# Patient Record
Sex: FEMALE | Race: WHITE | NOT HISPANIC OR LATINO | Employment: UNEMPLOYED | URBAN - METROPOLITAN AREA
[De-identification: names, ages, dates, MRNs, and addresses within clinical notes are randomized per-mention and may not be internally consistent; named-entity substitution may affect disease eponyms.]

---
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
## 2020-01-08 PROCEDURE — 87624 HPV HI-RISK TYP POOLED RSLT: CPT | Performed by: SPECIALIST

## 2020-01-08 PROCEDURE — 88175 CYTOPATH C/V AUTO FLUID REDO: CPT | Performed by: SPECIALIST

## 2020-01-09 ENCOUNTER — LAB REQUISITION (OUTPATIENT)
Dept: LAB | Facility: HOSPITAL | Age: 20
End: 2020-01-09
Payer: COMMERCIAL

## 2020-01-09 DIAGNOSIS — Z01.419 ENCOUNTER FOR GYNECOLOGICAL EXAMINATION (GENERAL) (ROUTINE) WITHOUT ABNORMAL FINDINGS: ICD-10-CM

## 2020-01-10 LAB
HPV HR 12 DNA CVX QL NAA+PROBE: NEGATIVE
HPV16 DNA CVX QL NAA+PROBE: NEGATIVE
HPV18 DNA CVX QL NAA+PROBE: NEGATIVE

## 2020-01-15 LAB
LAB AP GYN PRIMARY INTERPRETATION: NORMAL
LAB AP LMP: NORMAL
Lab: NORMAL
PATH INTERP SPEC-IMP: NORMAL

## 2023-02-09 ENCOUNTER — HOSPITAL ENCOUNTER (EMERGENCY)
Facility: HOSPITAL | Age: 23
Discharge: HOME | End: 2023-02-09
Attending: EMERGENCY MEDICINE

## 2023-02-09 VITALS
RESPIRATION RATE: 18 BRPM | SYSTOLIC BLOOD PRESSURE: 126 MMHG | TEMPERATURE: 98.6 F | OXYGEN SATURATION: 94 % | HEART RATE: 82 BPM | DIASTOLIC BLOOD PRESSURE: 66 MMHG

## 2023-02-09 DIAGNOSIS — J02.0 STREP PHARYNGITIS: ICD-10-CM

## 2023-02-09 DIAGNOSIS — N39.0 UTI (URINARY TRACT INFECTION): Primary | ICD-10-CM

## 2023-02-09 LAB
BACTERIA UR QL AUTO: ABNORMAL /HPF
BILIRUB UR QL STRIP: NEGATIVE
CLARITY UR: ABNORMAL
COLOR UR: ABNORMAL
EXT PREGNANCY TEST URINE: NEGATIVE
EXT. CONTROL: NORMAL
GLUCOSE UR STRIP-MCNC: NEGATIVE MG/DL
HGB UR QL STRIP.AUTO: ABNORMAL
KETONES UR STRIP-MCNC: NEGATIVE MG/DL
LEUKOCYTE ESTERASE UR QL STRIP: ABNORMAL
NITRITE UR QL STRIP: NEGATIVE
NON-SQ EPI CELLS URNS QL MICRO: ABNORMAL /HPF
PH UR STRIP.AUTO: 6 [PH]
PROT UR STRIP-MCNC: ABNORMAL MG/DL
RBC #/AREA URNS AUTO: ABNORMAL /HPF
S PYO DNA THROAT QL NAA+PROBE: DETECTED
SP GR UR STRIP.AUTO: 1.02
UROBILINOGEN UR QL STRIP.AUTO: 0.2 E.U./DL
WBC #/AREA URNS AUTO: ABNORMAL /HPF

## 2023-02-09 RX ORDER — AMOXICILLIN AND CLAVULANATE POTASSIUM 875; 125 MG/1; MG/1
1 TABLET, FILM COATED ORAL ONCE
Status: COMPLETED | OUTPATIENT
Start: 2023-02-09 | End: 2023-02-09

## 2023-02-09 RX ORDER — AMOXICILLIN AND CLAVULANATE POTASSIUM 875; 125 MG/1; MG/1
1 TABLET, FILM COATED ORAL EVERY 12 HOURS
Qty: 14 TABLET | Refills: 0 | Status: SHIPPED | OUTPATIENT
Start: 2023-02-09 | End: 2023-02-16

## 2023-02-09 RX ADMIN — AMOXICILLIN AND CLAVULANATE POTASSIUM 1 TABLET: 875; 125 TABLET, FILM COATED ORAL at 15:23

## 2023-02-09 NOTE — ED PROVIDER NOTES
History  Chief Complaint   Patient presents with   • Blood in Urine     Burning / pain with urination  Lower bilateral back pain  Low abdominal pressure     20-year-old female presents emergency department complaining of 2-day history of dysuria and hematuria  The patient denies ever having a urinary tract infection before but feels that she has one now  The patient denies any significant back pain or fever or chills  Patient notes she is also having a sore throat and is concerned she may have strep throat  Patient notes that she has blood in her urine when she urinates and is urinating frequency with not much volume  Patient denies pregnancy and notes that she is on birth control  Patient visiting from [place] and has no local PMD           Prior to Admission Medications   Prescriptions Last Dose Informant Patient Reported? Taking?   ibuprofen (MOTRIN) 800 mg tablet   No No   Sig: Take 1 tablet (800 mg total) by mouth 3 (three) times a day   Patient not taking: Reported on 2019      Facility-Administered Medications: None       Past Medical History:   Diagnosis Date   •  2017   • Anemia        History reviewed  No pertinent surgical history  History reviewed  No pertinent family history  I have reviewed and agree with the history as documented  E-Cigarette/Vaping     E-Cigarette/Vaping Substances     Social History     Tobacco Use   • Smoking status: Every Day     Packs/day: 0 50     Types: Cigarettes   • Smokeless tobacco: Never   Substance Use Topics   • Alcohol use: No   • Drug use: Yes     Types: Marijuana     Comment: last use 2018       Review of Systems   Constitutional: Negative for chills and fever  HENT: Positive for sore throat  Negative for ear pain  Eyes: Negative for pain and visual disturbance  Respiratory: Negative for cough and shortness of breath  Cardiovascular: Negative for chest pain and palpitations  Gastrointestinal: Positive for abdominal pain  Negative for vomiting  Genitourinary: Positive for difficulty urinating, frequency and hematuria  Negative for dysuria  Musculoskeletal: Negative for arthralgias and back pain  Skin: Negative for color change and rash  Neurological: Negative for seizures and syncope  All other systems reviewed and are negative  Physical Exam  Physical Exam  Vitals and nursing note reviewed  Constitutional:       General: She is not in acute distress  Appearance: Normal appearance  She is well-developed  HENT:      Head: Normocephalic and atraumatic  Right Ear: External ear normal       Left Ear: External ear normal       Nose: Nose normal       Mouth/Throat:      Mouth: Mucous membranes are moist       Pharynx: Posterior oropharyngeal erythema present  No oropharyngeal exudate  Eyes:      Conjunctiva/sclera: Conjunctivae normal       Pupils: Pupils are equal, round, and reactive to light  Cardiovascular:      Rate and Rhythm: Normal rate and regular rhythm  Pulses: Normal pulses  Heart sounds: Normal heart sounds  No murmur heard  Pulmonary:      Effort: Pulmonary effort is normal  No respiratory distress  Breath sounds: Normal breath sounds  Abdominal:      General: Bowel sounds are normal       Palpations: Abdomen is soft  Tenderness: There is abdominal tenderness  Comments: Suprapubic tenderness noted without guarding rigidity rebound   Musculoskeletal:         General: No swelling or deformity  Cervical back: Neck supple  Skin:     General: Skin is warm and dry  Capillary Refill: Capillary refill takes less than 2 seconds  Neurological:      General: No focal deficit present  Mental Status: She is alert and oriented to person, place, and time  Mental status is at baseline           Vital Signs  ED Triage Vitals [02/09/23 1217]   Temperature Pulse Respirations Blood Pressure SpO2   98 6 °F (37 °C) 82 18 126/66 94 %      Temp Source Heart Rate Source Patient Position - Orthostatic VS BP Location FiO2 (%)   Temporal Monitor Sitting Left arm --      Pain Score       --           Vitals:    02/09/23 1217   BP: 126/66   Pulse: 82   Patient Position - Orthostatic VS: Sitting         Visual Acuity      ED Medications  Medications   amoxicillin-clavulanate (AUGMENTIN) 875-125 mg per tablet 1 tablet (1 tablet Oral Given 2/9/23 1523)       Diagnostic Studies  Results Reviewed     Procedure Component Value Units Date/Time    Strep A PCR [[id number]]  (Abnormal) Collected: 02/09/23 1416    Lab Status: Final result Specimen: Throat Updated: 02/09/23 1456     STREP A PCR Detected    POCT pregnancy, urine [[id number]]  (Normal) Resulted: 02/09/23 1325    Lab Status: Final result Updated: 02/09/23 1325     EXT Preg Test, Ur Negative     Control Valid    Urine Microscopic [[id number]]  (Abnormal) Collected: 02/09/23 1227    Lab Status: Final result Specimen: Urine, Clean Catch Updated: 02/09/23 1312     RBC, UA 30-50 /hpf      WBC, UA 20-30 /hpf      Epithelial Cells Occasional /hpf      Bacteria, UA Occasional /hpf     Urine culture [[id number]] Collected: 02/09/23 1227    Lab Status:  In process Specimen: Urine, Clean Catch Updated: 02/09/23 1307    UA w Reflex to Microscopic w Reflex to Culture [[id number]]  (Abnormal) Collected: 02/09/23 1227    Lab Status: Final result Specimen: Urine, Clean Catch Updated: 02/09/23 1259     Color, UA Brown     Clarity, UA Cloudy     Specific Gravity, UA 1 025     pH, UA 6 0     Leukocytes, UA 1+     Nitrite, UA Negative     Protein, UA 2+ mg/dl      Glucose, UA Negative mg/dl      Ketones, UA Negative mg/dl      Urobilinogen, UA 0 2 E U /dl      Bilirubin, UA Negative     Occult Blood, UA 3+                 No orders to display              Procedures  Procedures         ED Course  ED Course as of 02/09/23 2142   Thu Feb 09, 2023   1510 STREP A PCR(!): Detected                                             Medical Decision Making  19-year-old female with history of hematuria dysuria and frequency presents to the ER for evaluation of possible UTI  Patient denies any significant flank pain or fever  Patient also complaining of sore throat  Urinalysis done which shows elevated red and white cells in her urine  Strep positive  Patient placed on antibiotics to cover both the urine and strep throat  Patient from [place] so we will follow-up with her family doctor back home  Patient to return here if worse  Strep pharyngitis: acute illness or injury  UTI (urinary tract infection): acute illness or injury  Amount and/or Complexity of Data Reviewed  Labs: ordered  Decision-making details documented in ED Course  Risk  Prescription drug management  Disposition  Final diagnoses:   UTI (urinary tract infection)   Strep pharyngitis     Time reflects when diagnosis was documented in both MDM as applicable and the Disposition within this note     Time User Action Codes Description Comment    2/9/2023  3:13 PM Jamaal Fournier Add [N39 0] UTI (urinary tract infection)     2/9/2023  3:13 PM Jamaal Fournier Add [J02 0] Strep pharyngitis       ED Disposition     ED Disposition   Discharge    Condition   Stable    Date/Time   Thu Feb 9, 2023  3:20 PM    Comment   Michela Galeazzi discharge to home/self care  Follow-up Information    None         Discharge Medication List as of 2/9/2023  3:20 PM      START taking these medications    Details   amoxicillin-clavulanate (AUGMENTIN) 875-125 mg per tablet Take 1 tablet by mouth every 12 (twelve) hours for 7 days, Starting Thu 2/9/2023, Until Thu 2/16/2023, Normal         CONTINUE these medications which have NOT CHANGED    Details   ibuprofen (MOTRIN) 800 mg tablet Take 1 tablet (800 mg total) by mouth 3 (three) times a day, Starting Thu 5/30/2019, Normal             No discharge procedures on file  PDMP Review     None          ED Provider  Electronically Signed by           Rosales Husain  Cam Ohara ,   02/09/23 3267

## 2023-02-09 NOTE — DISCHARGE INSTRUCTIONS
Drink plenty of fluids  Consider taking over-the-counter pyridium to decrease pain with urination  Take Augmentin 1 pill twice a day for a week  Finish all antibiotics even if feeling better    Consider taking a probiotic or yogurt daily while on this medication  Follow-up with your family doctor in [place] upon your return  Return to the ER for any new, concerning, or worsening issues

## 2023-02-11 LAB — BACTERIA UR CULT: ABNORMAL

## 2023-10-25 ENCOUNTER — HOSPITAL ENCOUNTER (EMERGENCY)
Facility: HOSPITAL | Age: 23
Discharge: HOME | End: 2023-10-25
Attending: EMERGENCY MEDICINE
Payer: COMMERCIAL

## 2023-10-25 VITALS
SYSTOLIC BLOOD PRESSURE: 143 MMHG | WEIGHT: 98 LBS | OXYGEN SATURATION: 100 % | DIASTOLIC BLOOD PRESSURE: 79 MMHG | RESPIRATION RATE: 16 BRPM | TEMPERATURE: 99.7 F | HEART RATE: 87 BPM | HEIGHT: 64 IN | BODY MASS INDEX: 16.73 KG/M2

## 2023-10-25 DIAGNOSIS — D64.9 ANEMIA: ICD-10-CM

## 2023-10-25 DIAGNOSIS — R55 VASOVAGAL EPISODE: ICD-10-CM

## 2023-10-25 DIAGNOSIS — B34.9 VIRAL ILLNESS: Primary | ICD-10-CM

## 2023-10-25 LAB
ANION GAP SERPL CALCULATED.3IONS-SCNC: 9 MMOL/L
BASOPHILS # BLD AUTO: 0.05 THOUSANDS/ÂΜL (ref 0–0.1)
BASOPHILS NFR BLD AUTO: 1 % (ref 0–1)
BILIRUB UR QL STRIP: NEGATIVE
BUN SERPL-MCNC: 7 MG/DL (ref 5–25)
CALCIUM SERPL-MCNC: 9.8 MG/DL (ref 8.4–10.2)
CHLORIDE SERPL-SCNC: 103 MMOL/L (ref 96–108)
CLARITY UR: CLEAR
CO2 SERPL-SCNC: 26 MMOL/L (ref 21–32)
COLOR UR: NORMAL
CREAT SERPL-MCNC: 0.66 MG/DL (ref 0.6–1.3)
EOSINOPHIL # BLD AUTO: 0.06 THOUSAND/ÂΜL (ref 0–0.61)
EOSINOPHIL NFR BLD AUTO: 1 % (ref 0–6)
ERYTHROCYTE [DISTWIDTH] IN BLOOD BY AUTOMATED COUNT: 14.9 % (ref 11.6–15.1)
EXT PREGNANCY TEST URINE: NEGATIVE
EXT. CONTROL: NORMAL
GFR SERPL CREATININE-BSD FRML MDRD: 124 ML/MIN/1.73SQ M
GLUCOSE SERPL-MCNC: 92 MG/DL (ref 65–140)
GLUCOSE UR STRIP-MCNC: NEGATIVE MG/DL
HCT VFR BLD AUTO: 35.6 % (ref 34.8–46.1)
HGB BLD-MCNC: 10.9 G/DL (ref 11.5–15.4)
HGB UR QL STRIP.AUTO: NEGATIVE
IMM GRANULOCYTES # BLD AUTO: 0.02 THOUSAND/UL (ref 0–0.2)
IMM GRANULOCYTES NFR BLD AUTO: 0 % (ref 0–2)
KETONES UR STRIP-MCNC: NEGATIVE MG/DL
LEUKOCYTE ESTERASE UR QL STRIP: NEGATIVE
LYMPHOCYTES # BLD AUTO: 2.04 THOUSANDS/ÂΜL (ref 0.6–4.47)
LYMPHOCYTES NFR BLD AUTO: 20 % (ref 14–44)
MCH RBC QN AUTO: 24.7 PG (ref 26.8–34.3)
MCHC RBC AUTO-ENTMCNC: 30.6 G/DL (ref 31.4–37.4)
MCV RBC AUTO: 81 FL (ref 82–98)
MONOCYTES # BLD AUTO: 0.78 THOUSAND/ÂΜL (ref 0.17–1.22)
MONOCYTES NFR BLD AUTO: 8 % (ref 4–12)
NEUTROPHILS # BLD AUTO: 7.21 THOUSANDS/ÂΜL (ref 1.85–7.62)
NEUTS SEG NFR BLD AUTO: 70 % (ref 43–75)
NITRITE UR QL STRIP: NEGATIVE
NRBC BLD AUTO-RTO: 0 /100 WBCS
PH UR STRIP.AUTO: 6 [PH]
PLATELET # BLD AUTO: 353 THOUSANDS/UL (ref 149–390)
PMV BLD AUTO: 10.2 FL (ref 8.9–12.7)
POTASSIUM SERPL-SCNC: 4 MMOL/L (ref 3.5–5.3)
PROT UR STRIP-MCNC: NEGATIVE MG/DL
RBC # BLD AUTO: 4.42 MILLION/UL (ref 3.81–5.12)
SODIUM SERPL-SCNC: 138 MMOL/L (ref 135–147)
SP GR UR STRIP.AUTO: <=1.005 (ref 1–1.03)
UROBILINOGEN UR QL STRIP.AUTO: 0.2 E.U./DL
WBC # BLD AUTO: 10.16 THOUSAND/UL (ref 4.31–10.16)

## 2023-10-25 PROCEDURE — 81025 URINE PREGNANCY TEST: CPT | Performed by: EMERGENCY MEDICINE

## 2023-10-25 PROCEDURE — 96360 HYDRATION IV INFUSION INIT: CPT

## 2023-10-25 PROCEDURE — 93005 ELECTROCARDIOGRAM TRACING: CPT

## 2023-10-25 PROCEDURE — 80048 BASIC METABOLIC PNL TOTAL CA: CPT | Performed by: EMERGENCY MEDICINE

## 2023-10-25 PROCEDURE — 36415 COLL VENOUS BLD VENIPUNCTURE: CPT | Performed by: EMERGENCY MEDICINE

## 2023-10-25 PROCEDURE — 85025 COMPLETE CBC W/AUTO DIFF WBC: CPT | Performed by: EMERGENCY MEDICINE

## 2023-10-25 PROCEDURE — 99284 EMERGENCY DEPT VISIT MOD MDM: CPT | Performed by: EMERGENCY MEDICINE

## 2023-10-25 PROCEDURE — 81003 URINALYSIS AUTO W/O SCOPE: CPT | Performed by: EMERGENCY MEDICINE

## 2023-10-25 PROCEDURE — 99284 EMERGENCY DEPT VISIT MOD MDM: CPT

## 2023-10-25 RX ADMIN — SODIUM CHLORIDE 1000 ML: 0.9 INJECTION, SOLUTION INTRAVENOUS at 18:42

## 2023-10-25 NOTE — ED PROVIDER NOTES
History  Chief Complaint   Patient presents with    Dizziness     Lightheaded within the last hour of work, developed tunnel vision while driving home. Endorses chills, denies n/v/d. This is a 75-year-old female who presents emergency department complaint of lightheadedness and dizziness today at the end of her shift. She states that she was feeling fairly well until this occurred. She felt lightheaded. Patient felt like her vision was getting tunnel and she had ringing in her ears and she felt flushed during the episode. She felt like she was going to pass out but did not actually completely pass out. She states that when she left work while she was driving she developed some tunnel vision and felt like her lips got tingly. She had to pull over. She does state that she had a large cup of coffee today and also took a migraine headache medication that did contain caffeine. Patient also states that she had an episode of vomiting and some loose stool today as well. Denies fevers or chills. She denies possible pregnancy. No abdominal pain. No chest pain. During the episode she felt like her heart was going faster than normal.  LMP was approximately 1 week ago. Differential diagnosis includes vasovagal episode, gastroenteritis, electrolyte abnormality, dehydration, tachycardia due to caffeine use. Prior to Admission Medications   Prescriptions Last Dose Informant Patient Reported? Taking?   ibuprofen (MOTRIN) 800 mg tablet   No No   Sig: Take 1 tablet (800 mg total) by mouth 3 (three) times a day   Patient not taking: Reported on 2019      Facility-Administered Medications: None       Past Medical History:   Diagnosis Date     2017    Anemia        History reviewed. No pertinent surgical history. History reviewed. No pertinent family history. I have reviewed and agree with the history as documented.     E-Cigarette/Vaping     E-Cigarette/Vaping Substances     Social History Tobacco Use    Smoking status: Every Day     Packs/day: 0.50     Types: Cigarettes    Smokeless tobacco: Never   Substance Use Topics    Alcohol use: No    Drug use: Yes     Types: Marijuana     Comment: last use 6/2018       Review of Systems   Constitutional:  Negative for activity change, appetite change and fever. HENT:  Negative for congestion, ear pain, rhinorrhea and sore throat. Eyes:  Negative for pain and redness. Respiratory:  Negative for cough, shortness of breath and wheezing. Cardiovascular:  Negative for chest pain and palpitations. Gastrointestinal:  Positive for diarrhea and vomiting. Negative for abdominal pain and nausea. Endocrine: Negative for polyuria. Genitourinary:  Negative for difficulty urinating, dysuria, frequency and urgency. Musculoskeletal:  Negative for arthralgias and myalgias. Skin:  Negative for color change and rash. Allergic/Immunologic: Negative for immunocompromised state. Neurological:  Positive for dizziness and light-headedness. Negative for syncope. Hematological:  Does not bruise/bleed easily. Psychiatric/Behavioral:  Negative for confusion. All other systems reviewed and are negative. Physical Exam  Physical Exam  Vitals and nursing note reviewed. Constitutional:       General: She is not in acute distress. Appearance: She is well-developed. HENT:      Head: Normocephalic and atraumatic. Nose: Nose normal.   Eyes:      General: No scleral icterus. Extraocular Movements: Extraocular movements intact. Conjunctiva/sclera: Conjunctivae normal.      Pupils: Pupils are equal, round, and reactive to light. Cardiovascular:      Rate and Rhythm: Normal rate and regular rhythm. Pulses: Normal pulses. Heart sounds: Normal heart sounds. No murmur heard. Pulmonary:      Effort: Pulmonary effort is normal. No respiratory distress. Breath sounds: Normal breath sounds. No stridor. No wheezing.    Abdominal: General: There is no distension. Palpations: Abdomen is soft. Tenderness: There is no abdominal tenderness. There is no guarding or rebound. Musculoskeletal:         General: No deformity. Cervical back: Normal range of motion and neck supple. Skin:     General: Skin is warm and dry. Coloration: Skin is not jaundiced. Findings: No bruising or rash. Neurological:      Mental Status: She is alert and oriented to person, place, and time. Psychiatric:         Thought Content:  Thought content normal.         Vital Signs  ED Triage Vitals [10/25/23 1810]   Temperature Pulse Respirations Blood Pressure SpO2   99.7 °F (37.6 °C) 87 16 143/79 100 %      Temp Source Heart Rate Source Patient Position - Orthostatic VS BP Location FiO2 (%)   Temporal Monitor Lying Right arm --      Pain Score       --           Vitals:    10/25/23 1810   BP: 143/79   Pulse: 87   Patient Position - Orthostatic VS: Lying         Visual Acuity      ED Medications  Medications   sodium chloride 0.9 % bolus 1,000 mL (1,000 mL Intravenous New Bag 10/25/23 1842)       Diagnostic Studies  Results Reviewed       Procedure Component Value Units Date/Time    Basic metabolic panel [[id number]] Collected: 10/25/23 1836    Lab Status: Final result Specimen: Blood from Arm, Right Updated: 10/25/23 1901     Sodium 138 mmol/L      Potassium 4.0 mmol/L      Chloride 103 mmol/L      CO2 26 mmol/L      ANION GAP 9 mmol/L      BUN 7 mg/dL      Creatinine 0.66 mg/dL      Glucose 92 mg/dL      Calcium 9.8 mg/dL      eGFR 124 ml/min/1.73sq m     Narrative:      [place] guidelines for Chronic Kidney Disease (CKD):     Stage 1 with normal or high GFR (GFR > 90 mL/min/1.73 square meters)    Stage 2 Mild CKD (GFR = 60-89 mL/min/1.73 square meters)    Stage 3A Moderate CKD (GFR = 45-59 mL/min/1.73 square meters)    Stage 3B Moderate CKD (GFR = 30-44 mL/min/1.73 square meters)    Stage 4 Severe CKD (GFR = 15-29 mL/min/1.73 square meters)    Stage 5 End Stage CKD (GFR <15 mL/min/1.73 square meters)  Note: GFR calculation is accurate only with a steady state creatinine    UA (URINE) with reflex to Scope [[id number]] Collected: 10/25/23 1836    Lab Status: Final result Specimen: Urine, Other Updated: 10/25/23 1844     Color, UA Light Yellow     Clarity, UA Clear     Specific Gravity, UA <=1.005     pH, UA 6.0     Leukocytes, UA Negative     Nitrite, UA Negative     Protein, UA Negative mg/dl      Glucose, UA Negative mg/dl      Ketones, UA Negative mg/dl      Urobilinogen, UA 0.2 E.U./dl      Bilirubin, UA Negative     Occult Blood, UA Negative    POCT pregnancy, urine [[id number]]  (Normal) Resulted: 10/25/23 1842    Lab Status: Final result Updated: 10/25/23 1842     EXT Preg Test, Ur Negative     Control Valid    CBC and differential [[id number]]  (Abnormal) Collected: 10/25/23 1836    Lab Status: Final result Specimen: Blood from Arm, Right Updated: 10/25/23 1842     WBC 10.16 Thousand/uL      RBC 4.42 Million/uL      Hemoglobin 10.9 g/dL      Hematocrit 35.6 %      MCV 81 fL      MCH 24.7 pg      MCHC 30.6 g/dL      RDW 14.9 %      MPV 10.2 fL      Platelets 925 Thousands/uL      nRBC 0 /100 WBCs      Neutrophils Relative 70 %      Immat GRANS % 0 %      Lymphocytes Relative 20 %      Monocytes Relative 8 %      Eosinophils Relative 1 %      Basophils Relative 1 %      Neutrophils Absolute 7.21 Thousands/µL      Immature Grans Absolute 0.02 Thousand/uL      Lymphocytes Absolute 2.04 Thousands/µL      Monocytes Absolute 0.78 Thousand/µL      Eosinophils Absolute 0.06 Thousand/µL      Basophils Absolute 0.05 Thousands/µL                    No orders to display              Procedures  ECG 12 Lead Documentation Only    Date/Time: 10/25/2023 6:24 PM    Performed by: Tate Husain MD  Authorized by: Tate Husain MD    Indications / Diagnosis:  Lightheaded  ECG reviewed by me, the ED Provider: yes    Patient location:  ED  Rate:     ECG rate assessment: normal    Rhythm:     Rhythm: sinus rhythm    Ectopy:     Ectopy: none    QRS:     QRS axis:  Normal    QRS intervals:  Normal  Conduction:     Conduction: normal    ST segments:     ST segments:  Normal  T waves:     T waves: normal             ED Course  ED Course as of 10/25/23 1947   Wed Oct 25, 2023   1829 Review of records shows no recent ER visits or hospitalizations. 1921 Hemoglobin(!): 10.9   1921 Old records reviewed hemoglobin baseline 4 years ago was 9.2.   1945 Patient is feeling better after fluids. Lab results were discussed with patient. She was advised of anemia and states that she has had this in the past with prior pregnancies. She was advised to take a over-the-counter iron supplement or supplement her diet with iron rich foods. Follow-up with PCP. Will give ambulatory referral as patient is new to the area. Medical Decision Making  Patient with likely vasovagal episode today. Had some vomiting and loose stool today. Likely viral illness. Labs look good. Does have anemia. Patient hydrated in the ED feeling better. Amount and/or Complexity of Data Reviewed  External Data Reviewed: notes. Details: See ED course  Labs: ordered. Decision-making details documented in ED Course. ECG/medicine tests: ordered and independent interpretation performed. Risk  OTC drugs.              Disposition  Final diagnoses:   Viral illness   Anemia   Vasovagal episode     Time reflects when diagnosis was documented in both MDM as applicable and the Disposition within this note       Time User Action Codes Description Comment    10/25/2023  7:42 PM Renee Oswald Add [B34.9] Viral illness     10/25/2023  7:42 PM Renee Oswald Add [D64.9] Anemia     10/25/2023  7:42 PM Seble John Add [R55] Vasovagal episode           ED Disposition       ED Disposition   Discharge    Condition   Stable Date/Time   Wed Oct 25, 2023  7:42 PM    Comment   Grant Lei discharge to home/self care.                    Follow-up Information    None         Patient's Medications   Discharge Prescriptions    No medications on file           PDMP Review       None            ED Provider  Electronically Signed by             Watson Gale MD  10/25/23 7905

## 2023-10-25 NOTE — DISCHARGE INSTRUCTIONS
Your labs showed that you are slightly anemic today. You can supplement your diet with high iron foods or take an over the counter iron supplement. Please follow-up with your primary care provider for further evaluation.

## 2023-10-27 LAB
ATRIAL RATE: 84 BPM
P AXIS: 47 DEGREES
PR INTERVAL: 142 MS
QRS AXIS: 58 DEGREES
QRSD INTERVAL: 92 MS
QT INTERVAL: 364 MS
QTC INTERVAL: 430 MS
T WAVE AXIS: 68 DEGREES
VENTRICULAR RATE: 84 BPM

## 2023-10-27 PROCEDURE — 93010 ELECTROCARDIOGRAM REPORT: CPT | Performed by: INTERNAL MEDICINE

## 2024-02-03 ENCOUNTER — OFFICE VISIT (OUTPATIENT)
Dept: URGENT CARE | Facility: CLINIC | Age: 24
End: 2024-02-03
Payer: COMMERCIAL

## 2024-02-03 VITALS
HEART RATE: 108 BPM | HEIGHT: 63 IN | BODY MASS INDEX: 17.82 KG/M2 | WEIGHT: 100.6 LBS | OXYGEN SATURATION: 99 % | SYSTOLIC BLOOD PRESSURE: 101 MMHG | RESPIRATION RATE: 20 BRPM | TEMPERATURE: 100.2 F | DIASTOLIC BLOOD PRESSURE: 68 MMHG

## 2024-02-03 DIAGNOSIS — J02.0 STREP PHARYNGITIS: Primary | ICD-10-CM

## 2024-02-03 LAB — S PYO AG THROAT QL: POSITIVE

## 2024-02-03 PROCEDURE — 87880 STREP A ASSAY W/OPTIC: CPT | Performed by: PHYSICIAN ASSISTANT

## 2024-02-03 PROCEDURE — G0382 LEV 3 HOSP TYPE B ED VISIT: HCPCS | Performed by: PHYSICIAN ASSISTANT

## 2024-02-03 RX ORDER — AMOXICILLIN 875 MG/1
875 TABLET, COATED ORAL 2 TIMES DAILY
Qty: 20 TABLET | Refills: 0 | Status: SHIPPED | OUTPATIENT
Start: 2024-02-03 | End: 2024-02-13

## 2024-02-03 NOTE — PATIENT INSTRUCTIONS
I have prescribed an antibiotic for the infection.  Please take the antibiotic as prescribed and finish the entire prescription.  Take an over the counter probiotic or eat yogurt with live cultures in it (activia) to keep good bacteria in the gut and help prevent diarrhea.      Can use over the counter cough and cold medications to help with symptoms.    Ibuprofen and/or tylenol as needed for pain or fever.    Salt water gargles.  Chloraseptic throat spray or lozenges.  Warm tea with honey.  Drink iced/cold drinks.    Strep is contagious.  Avoid being around others until you are on the antibiotic for 24 hours.  Change your toothbrush towards the end of your antibiotic course.  Wash hands frequently to prevent the spread of infection, do not share cups etc.      If not improving over the next 7-10 days, follow up with PCP.

## 2024-02-03 NOTE — LETTER
February 3, 2024     Patient: Vannessa Birmingham   YOB: 2000   Date of Visit: 2/3/2024       To Whom It May Concern:    It is my medical opinion that Vannessa Birmingham should not return to work until 2/5/24.    If you have any questions or concerns, please don't hesitate to call.         Sincerely,        Michelle Behler, PA-C    CC: No Recipients

## 2024-02-03 NOTE — PROGRESS NOTES
[facility]    NAME: Vannessa Birmingham is a 23 y.o. female  : 2000    MRN: [id number]  DATE: February 3, 2024  TIME: 9:41 AM    Assessment and Plan   Strep pharyngitis [J02.0]  1. Strep pharyngitis  POCT rapid ANTIGEN strepA    amoxicillin (AMOXIL) 875 mg tablet          Patient Instructions     Patient Instructions   I have prescribed an antibiotic for the infection.  Please take the antibiotic as prescribed and finish the entire prescription.  Take an over the counter probiotic or eat yogurt with live cultures in it (activia) to keep good bacteria in the gut and help prevent diarrhea.      Can use over the counter cough and cold medications to help with symptoms.    Ibuprofen and/or tylenol as needed for pain or fever.    Salt water gargles.  Chloraseptic throat spray or lozenges.  Warm tea with honey.  Drink iced/cold drinks.    Strep is contagious.  Avoid being around others until you are on the antibiotic for 24 hours.  Change your toothbrush towards the end of your antibiotic course.  Wash hands frequently to prevent the spread of infection, do not share cups etc.      If not improving over the next 7-10 days, follow up with PCP.      Chief Complaint     Chief Complaint   Patient presents with    Sore Throat     Since yesterday with low grade temp, 2 ppl in home are +strep       History of Present Illness   23 year old female here with complaint of sore throat and headache since yesterday. Fever.  Family positive for strep.        Review of Systems   Review of Systems   Constitutional:  Positive for fever. Negative for appetite change and chills.   HENT:  Positive for sore throat. Negative for congestion, ear discharge, ear pain, facial swelling, postnasal drip, sinus pressure and sneezing.    Respiratory:  Negative for cough, shortness of breath and wheezing.    Neurological:  Positive for headaches.       Current Medications     Current Outpatient Medications:     amoxicillin (AMOXIL) 875  "mg tablet, Take 1 tablet (875 mg total) by mouth 2 (two) times a day for 10 days, Disp: 20 tablet, Rfl: 0    ibuprofen (MOTRIN) 800 mg tablet, Take 1 tablet (800 mg total) by mouth 3 (three) times a day (Patient not taking: Reported on 2019), Disp: 21 tablet, Rfl: 0    Current Allergies     Allergies as of 2024    (No Known Allergies)          The following portions of the patient's history were reviewed and updated as appropriate: allergies, current medications, past family history, past medical history, past social history, past surgical history and problem list.   Past Medical History:   Diagnosis Date     2017    Anemia      History reviewed. No pertinent surgical history.  History reviewed. No pertinent family history.  Social History     Socioeconomic History    Marital status: Single     Spouse name: Not on file    Number of children: Not on file    Years of education: Not on file    Highest education level: Not on file   Occupational History    Not on file   Tobacco Use    Smoking status: Former     Current packs/day: 0.00     Types: Cigarettes     Quit date:      Years since quittin.0    Smokeless tobacco: Never   Vaping Use    Vaping status: Some Days    Substances: Nicotine   Substance and Sexual Activity    Alcohol use: No    Drug use: Yes     Types: Marijuana     Comment: last use 2018    Sexual activity: Yes     Partners: Male   Other Topics Concern    Not on file   Social History Narrative    Not on file     Social Determinants of Health     Financial Resource Strain: Not on file   Food Insecurity: Not on file   Transportation Needs: Not on file   Physical Activity: Not on file   Stress: Not on file   Social Connections: Not on file   Intimate Partner Violence: Not on file   Housing Stability: Not on file     Medications have been verified.    Objective   /68   Pulse (!) 108   Temp 100.2 °F (37.9 °C)   Resp 20   Ht 5' 3\" (1.6 m)   Wt 45.6 kg (100 lb 9.6 oz)  "  SpO2 99%   BMI 17.82 kg/m²      Physical Exam   Physical Exam  Vitals and nursing note reviewed.   Constitutional:       General: She is not in acute distress.     Appearance: She is well-developed.   HENT:      Head: Normocephalic and atraumatic.      Right Ear: Tympanic membrane normal.      Left Ear: Tympanic membrane normal.      Nose: Nose normal. No mucosal edema or rhinorrhea.      Right Sinus: No maxillary sinus tenderness or frontal sinus tenderness.      Left Sinus: No maxillary sinus tenderness or frontal sinus tenderness.      Mouth/Throat:      Pharynx: Pharyngeal swelling and posterior oropharyngeal erythema present. No oropharyngeal exudate.      Tonsils: 3+ on the right. 3+ on the left.   Eyes:      Conjunctiva/sclera: Conjunctivae normal.   Cardiovascular:      Rate and Rhythm: Normal rate and regular rhythm.      Heart sounds: Normal heart sounds. No murmur heard.

## 2024-05-16 ENCOUNTER — VBI (OUTPATIENT)
Dept: ADMINISTRATIVE | Facility: OTHER | Age: 24
End: 2024-05-16

## 2024-09-03 ENCOUNTER — HOSPITAL ENCOUNTER (EMERGENCY)
Facility: HOSPITAL | Age: 24
Discharge: HOME | End: 2024-09-03
Attending: EMERGENCY MEDICINE
Payer: COMMERCIAL

## 2024-09-03 ENCOUNTER — APPOINTMENT (EMERGENCY)
Dept: CT IMAGING | Facility: HOSPITAL | Age: 24
End: 2024-09-03
Payer: COMMERCIAL

## 2024-09-03 VITALS
BODY MASS INDEX: 19.38 KG/M2 | WEIGHT: 109.35 LBS | HEIGHT: 63 IN | RESPIRATION RATE: 16 BRPM | SYSTOLIC BLOOD PRESSURE: 104 MMHG | DIASTOLIC BLOOD PRESSURE: 62 MMHG | HEART RATE: 87 BPM | OXYGEN SATURATION: 100 % | TEMPERATURE: 99.1 F

## 2024-09-03 DIAGNOSIS — N12 PYELONEPHRITIS: Primary | ICD-10-CM

## 2024-09-03 LAB
ALBUMIN SERPL BCG-MCNC: 4.5 G/DL (ref 3.5–5)
ALP SERPL-CCNC: 50 U/L (ref 34–104)
ALT SERPL W P-5'-P-CCNC: 7 U/L (ref 7–52)
ANION GAP SERPL CALCULATED.3IONS-SCNC: 12 MMOL/L (ref 4–13)
APTT PPP: 29 SECONDS (ref 23–34)
AST SERPL W P-5'-P-CCNC: 13 U/L (ref 13–39)
BACTERIA UR QL AUTO: ABNORMAL /HPF
BASOPHILS # BLD AUTO: 0.05 THOUSANDS/ÂΜL (ref 0–0.1)
BASOPHILS NFR BLD AUTO: 0 % (ref 0–1)
BILIRUB SERPL-MCNC: 0.48 MG/DL (ref 0.2–1)
BILIRUB UR QL STRIP: NEGATIVE
BUN SERPL-MCNC: 10 MG/DL (ref 5–25)
CALCIUM SERPL-MCNC: 9.8 MG/DL (ref 8.4–10.2)
CHLORIDE SERPL-SCNC: 101 MMOL/L (ref 96–108)
CLARITY UR: ABNORMAL
CO2 SERPL-SCNC: 22 MMOL/L (ref 21–32)
COLOR UR: YELLOW
CREAT SERPL-MCNC: 0.78 MG/DL (ref 0.6–1.3)
EOSINOPHIL # BLD AUTO: 0.04 THOUSAND/ÂΜL (ref 0–0.61)
EOSINOPHIL NFR BLD AUTO: 0 % (ref 0–6)
ERYTHROCYTE [DISTWIDTH] IN BLOOD BY AUTOMATED COUNT: 17.2 % (ref 11.6–15.1)
EXT PREGNANCY TEST URINE: NEGATIVE
EXT. CONTROL: NORMAL
GFR SERPL CREATININE-BSD FRML MDRD: 106 ML/MIN/1.73SQ M
GLUCOSE SERPL-MCNC: 93 MG/DL (ref 65–140)
GLUCOSE UR STRIP-MCNC: NEGATIVE MG/DL
HCT VFR BLD AUTO: 36.9 % (ref 34.8–46.1)
HGB BLD-MCNC: 11.4 G/DL (ref 11.5–15.4)
HGB UR QL STRIP.AUTO: ABNORMAL
IMM GRANULOCYTES # BLD AUTO: 0.05 THOUSAND/UL (ref 0–0.2)
IMM GRANULOCYTES NFR BLD AUTO: 0 % (ref 0–2)
INR PPP: 1.01 (ref 0.85–1.19)
KETONES UR STRIP-MCNC: ABNORMAL MG/DL
LEUKOCYTE ESTERASE UR QL STRIP: ABNORMAL
LYMPHOCYTES # BLD AUTO: 1.72 THOUSANDS/ÂΜL (ref 0.6–4.47)
LYMPHOCYTES NFR BLD AUTO: 13 % (ref 14–44)
MCH RBC QN AUTO: 24.6 PG (ref 26.8–34.3)
MCHC RBC AUTO-ENTMCNC: 30.9 G/DL (ref 31.4–37.4)
MCV RBC AUTO: 80 FL (ref 82–98)
MONOCYTES # BLD AUTO: 1.48 THOUSAND/ÂΜL (ref 0.17–1.22)
MONOCYTES NFR BLD AUTO: 11 % (ref 4–12)
NEUTROPHILS # BLD AUTO: 10.3 THOUSANDS/ÂΜL (ref 1.85–7.62)
NEUTS SEG NFR BLD AUTO: 76 % (ref 43–75)
NITRITE UR QL STRIP: POSITIVE
NON-SQ EPI CELLS URNS QL MICRO: ABNORMAL /HPF
NRBC BLD AUTO-RTO: 0 /100 WBCS
PH UR STRIP.AUTO: 6 [PH]
PLATELET # BLD AUTO: 336 THOUSANDS/UL (ref 149–390)
PMV BLD AUTO: 10.4 FL (ref 8.9–12.7)
POTASSIUM SERPL-SCNC: 3.6 MMOL/L (ref 3.5–5.3)
PROT SERPL-MCNC: 7.8 G/DL (ref 6.4–8.4)
PROT UR STRIP-MCNC: ABNORMAL MG/DL
PROTHROMBIN TIME: 13.8 SECONDS (ref 12.3–15)
RBC # BLD AUTO: 4.63 MILLION/UL (ref 3.81–5.12)
RBC #/AREA URNS AUTO: ABNORMAL /HPF
SODIUM SERPL-SCNC: 135 MMOL/L (ref 135–147)
SP GR UR STRIP.AUTO: 1.02
UROBILINOGEN UR QL STRIP.AUTO: 0.2 E.U./DL
WBC # BLD AUTO: 13.64 THOUSAND/UL (ref 4.31–10.16)
WBC #/AREA URNS AUTO: ABNORMAL /HPF

## 2024-09-03 PROCEDURE — 74176 CT ABD & PELVIS W/O CONTRAST: CPT

## 2024-09-03 PROCEDURE — 80053 COMPREHEN METABOLIC PANEL: CPT | Performed by: EMERGENCY MEDICINE

## 2024-09-03 PROCEDURE — 87077 CULTURE AEROBIC IDENTIFY: CPT

## 2024-09-03 PROCEDURE — 99284 EMERGENCY DEPT VISIT MOD MDM: CPT

## 2024-09-03 PROCEDURE — 85730 THROMBOPLASTIN TIME PARTIAL: CPT | Performed by: EMERGENCY MEDICINE

## 2024-09-03 PROCEDURE — 81025 URINE PREGNANCY TEST: CPT | Performed by: EMERGENCY MEDICINE

## 2024-09-03 PROCEDURE — 36415 COLL VENOUS BLD VENIPUNCTURE: CPT | Performed by: EMERGENCY MEDICINE

## 2024-09-03 PROCEDURE — 96365 THER/PROPH/DIAG IV INF INIT: CPT

## 2024-09-03 PROCEDURE — 81001 URINALYSIS AUTO W/SCOPE: CPT

## 2024-09-03 PROCEDURE — 96375 TX/PRO/DX INJ NEW DRUG ADDON: CPT

## 2024-09-03 PROCEDURE — 99284 EMERGENCY DEPT VISIT MOD MDM: CPT | Performed by: EMERGENCY MEDICINE

## 2024-09-03 PROCEDURE — 85025 COMPLETE CBC W/AUTO DIFF WBC: CPT | Performed by: EMERGENCY MEDICINE

## 2024-09-03 PROCEDURE — 87086 URINE CULTURE/COLONY COUNT: CPT

## 2024-09-03 PROCEDURE — 85610 PROTHROMBIN TIME: CPT | Performed by: EMERGENCY MEDICINE

## 2024-09-03 PROCEDURE — 87186 SC STD MICRODIL/AGAR DIL: CPT

## 2024-09-03 RX ORDER — KETOROLAC TROMETHAMINE 30 MG/ML
15 INJECTION, SOLUTION INTRAMUSCULAR; INTRAVENOUS ONCE
Status: COMPLETED | OUTPATIENT
Start: 2024-09-03 | End: 2024-09-03

## 2024-09-03 RX ORDER — CEFTRIAXONE 1 G/50ML
1000 INJECTION, SOLUTION INTRAVENOUS ONCE
Status: COMPLETED | OUTPATIENT
Start: 2024-09-03 | End: 2024-09-03

## 2024-09-03 RX ORDER — CEFDINIR 300 MG/1
300 CAPSULE ORAL EVERY 12 HOURS SCHEDULED
Qty: 20 CAPSULE | Refills: 0 | Status: SHIPPED | OUTPATIENT
Start: 2024-09-03 | End: 2024-09-07

## 2024-09-03 RX ADMIN — SODIUM CHLORIDE 500 ML: 0.9 INJECTION, SOLUTION INTRAVENOUS at 14:02

## 2024-09-03 RX ADMIN — KETOROLAC TROMETHAMINE 15 MG: 30 INJECTION, SOLUTION INTRAMUSCULAR at 14:28

## 2024-09-03 RX ADMIN — CEFTRIAXONE 1000 MG: 1 INJECTION, SOLUTION INTRAVENOUS at 13:49

## 2024-09-03 NOTE — DISCHARGE INSTRUCTIONS
Drink plenty fluids, consider cranberry juice as this may make an infection more difficult to spread.    Use Omnicef 1 pill twice a day for 10 days.  Finish all antibiotics even if doing better.    Use probiotic or yogurt daily while on this medication.    Use Motrin or Tylenol as needed for pain.    If you feel like you are getting much sicker, despite treatment, you should return to the ER.    You should have a family doctor for chronic medical care.  We will have our schedulers contact you to set up a follow-up appointment so you can be reevaluated after you are home and treated for a few days.

## 2024-09-03 NOTE — ED PROVIDER NOTES
History  Chief Complaint   Patient presents with    Flank Pain     Pt reports right sided flank pain starting this morning.      24-year-old female presents emergency room complaining of back pain as well as flank pain.  Patient notes that she has had fatigue over the last 24 hours and then went to bed last night.  She woke up today with right lower quadrant and right flank pain.  Patient denies fever chills nausea or vomiting.  Patient was concerned so she came to the hospital.  Patient denies any urinary frequency.        Prior to Admission Medications   Prescriptions Last Dose Informant Patient Reported? Taking?   ibuprofen (MOTRIN) 800 mg tablet   No No   Sig: Take 1 tablet (800 mg total) by mouth 3 (three) times a day   Patient not taking: Reported on 2019      Facility-Administered Medications: None       Past Medical History:   Diagnosis Date     2017    Anemia        No past surgical history on file.    No family history on file.  I have reviewed and agree with the history as documented.    E-Cigarette/Vaping    E-Cigarette Use Current Some Day User      E-Cigarette/Vaping Substances    Nicotine Yes      Social History     Tobacco Use    Smoking status: Former     Current packs/day: 0.00     Types: Cigarettes     Quit date:      Years since quittin.6    Smokeless tobacco: Never   Vaping Use    Vaping status: Some Days    Substances: Nicotine   Substance Use Topics    Alcohol use: No    Drug use: Yes     Types: Marijuana     Comment: last use        Review of Systems   Constitutional:  Positive for activity change. Negative for chills and fever.   HENT:  Negative for ear pain and sore throat.    Eyes:  Negative for pain and visual disturbance.   Respiratory:  Negative for cough and shortness of breath.    Cardiovascular:  Negative for chest pain and palpitations.   Gastrointestinal:  Negative for abdominal pain and vomiting.   Genitourinary:  Positive for flank pain. Negative for  dysuria and hematuria.   Musculoskeletal:  Positive for back pain. Negative for arthralgias.   Skin:  Negative for color change and rash.   Neurological:  Negative for syncope.   All other systems reviewed and are negative.      Physical Exam  Physical Exam  Vitals and nursing note reviewed.   Constitutional:       General: She is not in acute distress.     Appearance: Normal appearance. She is well-developed.   HENT:      Head: Normocephalic and atraumatic.      Right Ear: External ear normal.      Left Ear: External ear normal.      Nose: Nose normal.      Mouth/Throat:      Mouth: Mucous membranes are moist.   Eyes:      Conjunctiva/sclera: Conjunctivae normal.   Cardiovascular:      Rate and Rhythm: Normal rate and regular rhythm.      Pulses: Normal pulses.      Heart sounds: Normal heart sounds. No murmur heard.  Pulmonary:      Effort: Pulmonary effort is normal. No respiratory distress.      Breath sounds: Normal breath sounds.   Abdominal:      General: Bowel sounds are normal.      Palpations: Abdomen is soft.      Tenderness: There is abdominal tenderness. There is right CVA tenderness. There is no guarding or rebound.   Musculoskeletal:         General: No swelling or deformity.      Cervical back: Neck supple. No rigidity.   Skin:     General: Skin is warm and dry.      Capillary Refill: Capillary refill takes less than 2 seconds.      Coloration: Skin is not pale.   Neurological:      General: No focal deficit present.      Mental Status: She is alert and oriented to person, place, and time. Mental status is at baseline.         Vital Signs  ED Triage Vitals [09/03/24 1307]   Temperature Pulse Respirations Blood Pressure SpO2   99.1 °F (37.3 °C) 84 18 96/53 100 %      Temp Source Heart Rate Source Patient Position - Orthostatic VS BP Location FiO2 (%)   Temporal Monitor -- Right arm --      Pain Score       No Pain           Vitals:    09/03/24 1307 09/03/24 1500   BP: 96/53 104/62   Pulse: 84 87          Visual Acuity      ED Medications  Medications   cefTRIAXone (ROCEPHIN) IVPB (premix in dextrose) 1,000 mg 50 mL (0 mg Intravenous Stopped 9/3/24 1430)   sodium chloride 0.9 % bolus 500 mL (0 mL Intravenous Stopped 9/3/24 1459)   ketorolac (TORADOL) injection 15 mg (15 mg Intravenous Given 9/3/24 1428)       Diagnostic Studies  Results Reviewed       Procedure Component Value Units Date/Time    Comprehensive metabolic panel [[id number]] Collected: 09/03/24 1319    Lab Status: Final result Specimen: Blood from Arm, Right Updated: 09/03/24 1343     Sodium 135 mmol/L      Potassium 3.6 mmol/L      Chloride 101 mmol/L      CO2 22 mmol/L      ANION GAP 12 mmol/L      BUN 10 mg/dL      Creatinine 0.78 mg/dL      Glucose 93 mg/dL      Calcium 9.8 mg/dL      AST 13 U/L      ALT 7 U/L      Alkaline Phosphatase 50 U/L      Total Protein 7.8 g/dL      Albumin 4.5 g/dL      Total Bilirubin 0.48 mg/dL      eGFR 106 ml/min/1.73sq m     Narrative:      National Kidney Disease Foundation guidelines for Chronic Kidney Disease (CKD):     Stage 1 with normal or high GFR (GFR > 90 mL/min/1.73 square meters)    Stage 2 Mild CKD (GFR = 60-89 mL/min/1.73 square meters)    Stage 3A Moderate CKD (GFR = 45-59 mL/min/1.73 square meters)    Stage 3B Moderate CKD (GFR = 30-44 mL/min/1.73 square meters)    Stage 4 Severe CKD (GFR = 15-29 mL/min/1.73 square meters)    Stage 5 End Stage CKD (GFR <15 mL/min/1.73 square meters)  Note: GFR calculation is accurate only with a steady state creatinine    Protime-INR [[id number]]  (Normal) Collected: 09/03/24 1321    Lab Status: Final result Specimen: Blood from Arm, Right Updated: 09/03/24 1339     Protime 13.8 seconds      INR 1.01    Narrative:      INR Therapeutic Range    Indication                                             INR Range      Atrial Fibrillation                                               2.0-3.0  Hypercoagulable State                                    2.0.2.3  Left  Ventricular Asist Device                            2.0-3.0  Mechanical Heart Valve                                  -    Aortic(with afib, MI, embolism, HF, LA enlargement,    and/or coagulopathy)                                     2.0-3.0 (2.5-3.5)     Mitral                                                             2.5-3.5  Prosthetic/Bioprosthetic Heart Valve               2.0-3.0  Venous thromboembolism (VTE: VT, PE        2.0-3.0    APTT [[id number]]  (Normal) Collected: 09/03/24 1321    Lab Status: Final result Specimen: Blood from Arm, Right Updated: 09/03/24 1339     PTT 29 seconds     Urine Microscopic [[id number]]  (Abnormal) Collected: 09/03/24 1320    Lab Status: Final result Specimen: Urine, Clean Catch Updated: 09/03/24 1339     RBC, UA 4-10 /hpf      WBC, UA Innumerable /hpf      Epithelial Cells Occasional /hpf      Bacteria, UA Moderate /hpf     Urine culture [[id number]] Collected: 09/03/24 1320    Lab Status: In process Specimen: Urine, Clean Catch Updated: 09/03/24 1339    CBC and differential [[id number]]  (Abnormal) Collected: 09/03/24 1321    Lab Status: Final result Specimen: Blood from Arm, Right Updated: 09/03/24 1328     WBC 13.64 Thousand/uL      RBC 4.63 Million/uL      Hemoglobin 11.4 g/dL      Hematocrit 36.9 %      MCV 80 fL      MCH 24.6 pg      MCHC 30.9 g/dL      RDW 17.2 %      MPV 10.4 fL      Platelets 336 Thousands/uL      nRBC 0 /100 WBCs      Segmented % 76 %      Immature Grans % 0 %      Lymphocytes % 13 %      Monocytes % 11 %      Eosinophils Relative 0 %      Basophils Relative 0 %      Absolute Neutrophils 10.30 Thousands/µL      Absolute Immature Grans 0.05 Thousand/uL      Absolute Lymphocytes 1.72 Thousands/µL      Absolute Monocytes 1.48 Thousand/µL      Eosinophils Absolute 0.04 Thousand/µL      Basophils Absolute 0.05 Thousands/µL     UA w Reflex to Microscopic w Reflex to Culture [[id number]]  (Abnormal) Collected: 09/03/24 1320    Lab Status: Final result  Specimen: Urine, Clean Catch Updated: 09/03/24 1326     Color, UA Yellow     Clarity, UA Hazy     Specific Gravity, UA 1.020     pH, UA 6.0     Leukocytes, UA 2+     Nitrite, UA Positive     Protein, UA 2+ mg/dl      Glucose, UA Negative mg/dl      Ketones, UA 15 (1+) mg/dl      Urobilinogen, UA 0.2 E.U./dl      Bilirubin, UA Negative     Occult Blood, UA 2+    POCT pregnancy, urine [[id number]]  (Normal) Resulted: 09/03/24 1321    Lab Status: Final result Updated: 09/03/24 1322     EXT Preg Test, Ur Negative     Control Valid                   CT abdomen pelvis wo contrast   Final Result by Brett Mendes MD (09/03 1414)      Mild nonspecific circumferential bladder wall thickening of underdistended bladder. Recommend correlation with urinalysis to exclude cystitis.      Additional chronic/incidental findings as detailed above.      The study was marked in EPIC for immediate notification.               Workstation performed: [id number]                    Procedures  Procedures         ED Course  ED Course as of 09/04/24 0048   Tue Sep 03, 2024   1336 WBC(!): 13.64                                 SBIRT 20yo+      Flowsheet Row Most Recent Value   Initial Alcohol Screen: US AUDIT-C     1. How often do you have a drink containing alcohol? 0 Filed at: 09/03/2024 1307   2. How many drinks containing alcohol do you have on a typical day you are drinking?  0 Filed at: 09/03/2024 1307   3b. FEMALE Any Age, or MALE 65+: How often do you have 4 or more drinks on one occassion? 0 Filed at: 09/03/2024 1307   Audit-C Score 0 Filed at: 09/03/2024 1307   KARISSA: How many times in the past year have you...    Used an illegal drug or used a prescription medication for non-medical reasons? Never Filed at: 09/03/2024 1307                      Medical Decision Making  24-year-old female presents emerged with flank pain and right lower quadrant pain for roughly a day.  The patient denies nausea vomiting diarrhea vaginal discharge  or pregnancy, noting that she has a contraceptive implant in her left arm.  Patient notes no fever or chills but notes that she has been fatigued over the last day and was sleeping most of the day yesterday.  Patient decided come the hospital for evaluation.  The patient denies history of kidney stones, abdominal surgeries or significant allergies.  Differential diagnosis based on my evaluation is appendicitis renal calculus pyelonephritis or UTI.  Patient had lab studies which show an elevated white count and a urine which has too numerous to count white blood cells.  Pregnancy is negative.  CT scan shows evidence of bladder wall thickening.  The patient's urine shows too numerous to count white cells.  Patient's symptoms are most consistent with early pyelonephritis/urinary tract infection.  Patient was given a dose of Rocephin in the ER and will be placed on Omnicef for 10 days.  Patient told to return to the ER for new or concerning symptoms and will be discharged with a recommendation to follow-up with primary care.    Amount and/or Complexity of Data Reviewed  Labs: ordered. Decision-making details documented in ED Course.  Radiology: ordered.    Risk  Prescription drug management.                 Disposition  Final diagnoses:   Pyelonephritis     Time reflects when diagnosis was documented in both MDM as applicable and the Disposition within this note       Time User Action Codes Description Comment    9/3/2024  2:26 PM Ranjit Fournier Add [N12] Pyelonephritis           ED Disposition       ED Disposition   Discharge    Condition   Stable    Date/Time   Tue Sep 3, 2024 3462    Comment   Vannessa Birmingham discharge to home/self care.                   Follow-up Information    None         Discharge Medication List as of 9/3/2024  2:59 PM        START taking these medications    Details   cefdinir (OMNICEF) 300 mg capsule Take 1 capsule (300 mg total) by mouth every 12 (twelve) hours for 10 days, Starting  Tue 9/3/2024, Until Fri 9/13/2024, Normal           CONTINUE these medications which have NOT CHANGED    Details   ibuprofen (MOTRIN) 800 mg tablet Take 1 tablet (800 mg total) by mouth 3 (three) times a day, Starting Thu 5/30/2019, Normal                 PDMP Review       None            ED Provider  Electronically Signed by             Ranjit Fournier Jr., DO  09/04/24 0048

## 2024-09-04 ENCOUNTER — APPOINTMENT (EMERGENCY)
Dept: RADIOLOGY | Facility: HOSPITAL | Age: 24
DRG: 690 | End: 2024-09-04
Payer: COMMERCIAL

## 2024-09-04 ENCOUNTER — HOSPITAL ENCOUNTER (INPATIENT)
Facility: HOSPITAL | Age: 24
LOS: 2 days | Discharge: HOME | DRG: 690 | End: 2024-09-07
Attending: EMERGENCY MEDICINE | Admitting: HOSPITALIST
Payer: COMMERCIAL

## 2024-09-04 ENCOUNTER — HOSPITAL ENCOUNTER (EMERGENCY)
Facility: HOSPITAL | Age: 24
Discharge: HOME | End: 2024-09-04
Payer: COMMERCIAL

## 2024-09-04 VITALS
WEIGHT: 109 LBS | BODY MASS INDEX: 19.31 KG/M2 | DIASTOLIC BLOOD PRESSURE: 53 MMHG | TEMPERATURE: 98.9 F | SYSTOLIC BLOOD PRESSURE: 96 MMHG | RESPIRATION RATE: 16 BRPM | HEART RATE: 88 BPM | OXYGEN SATURATION: 98 %

## 2024-09-04 DIAGNOSIS — N12 PYELONEPHRITIS: ICD-10-CM

## 2024-09-04 DIAGNOSIS — R11.2 NAUSEA AND VOMITING: ICD-10-CM

## 2024-09-04 DIAGNOSIS — D64.9 ANEMIA: ICD-10-CM

## 2024-09-04 DIAGNOSIS — Z97.5 NEXPLANON IN PLACE: ICD-10-CM

## 2024-09-04 DIAGNOSIS — N12 PYELONEPHRITIS: Primary | ICD-10-CM

## 2024-09-04 DIAGNOSIS — K21.9 GASTROESOPHAGEAL REFLUX DISEASE, UNSPECIFIED WHETHER ESOPHAGITIS PRESENT: ICD-10-CM

## 2024-09-04 DIAGNOSIS — N30.90 CYSTITIS: ICD-10-CM

## 2024-09-04 DIAGNOSIS — R50.9 FEVER: Primary | ICD-10-CM

## 2024-09-04 LAB
ALBUMIN SERPL BCG-MCNC: 3.6 G/DL (ref 3.5–5)
ALBUMIN SERPL BCG-MCNC: 4.3 G/DL (ref 3.5–5)
ALP SERPL-CCNC: 39 U/L (ref 34–104)
ALP SERPL-CCNC: 50 U/L (ref 34–104)
ALT SERPL W P-5'-P-CCNC: 5 U/L (ref 7–52)
ALT SERPL W P-5'-P-CCNC: 7 U/L (ref 7–52)
ANION GAP SERPL CALCULATED.3IONS-SCNC: 14 MMOL/L (ref 4–13)
ANION GAP SERPL CALCULATED.3IONS-SCNC: 9 MMOL/L (ref 4–13)
AST SERPL W P-5'-P-CCNC: 11 U/L (ref 13–39)
AST SERPL W P-5'-P-CCNC: 13 U/L (ref 13–39)
BACTERIA UR QL AUTO: ABNORMAL /HPF
BASOPHILS # BLD AUTO: 0.02 THOUSANDS/ÂΜL (ref 0–0.1)
BASOPHILS # BLD AUTO: 0.03 THOUSANDS/ÂΜL (ref 0–0.1)
BASOPHILS NFR BLD AUTO: 0 % (ref 0–1)
BASOPHILS NFR BLD AUTO: 0 % (ref 0–1)
BILIRUB SERPL-MCNC: 0.28 MG/DL (ref 0.2–1)
BILIRUB SERPL-MCNC: 0.64 MG/DL (ref 0.2–1)
BILIRUB UR QL STRIP: NEGATIVE
BUN SERPL-MCNC: 10 MG/DL (ref 5–25)
BUN SERPL-MCNC: 7 MG/DL (ref 5–25)
CALCIUM SERPL-MCNC: 8.3 MG/DL (ref 8.4–10.2)
CALCIUM SERPL-MCNC: 9.5 MG/DL (ref 8.4–10.2)
CHLORIDE SERPL-SCNC: 102 MMOL/L (ref 96–108)
CHLORIDE SERPL-SCNC: 106 MMOL/L (ref 96–108)
CLARITY UR: ABNORMAL
CO2 SERPL-SCNC: 19 MMOL/L (ref 21–32)
CO2 SERPL-SCNC: 20 MMOL/L (ref 21–32)
COLOR UR: YELLOW
CREAT SERPL-MCNC: 0.68 MG/DL (ref 0.6–1.3)
CREAT SERPL-MCNC: 0.87 MG/DL (ref 0.6–1.3)
EOSINOPHIL # BLD AUTO: 0 THOUSAND/ÂΜL (ref 0–0.61)
EOSINOPHIL # BLD AUTO: 0.01 THOUSAND/ÂΜL (ref 0–0.61)
EOSINOPHIL NFR BLD AUTO: 0 % (ref 0–6)
EOSINOPHIL NFR BLD AUTO: 0 % (ref 0–6)
ERYTHROCYTE [DISTWIDTH] IN BLOOD BY AUTOMATED COUNT: 17.1 % (ref 11.6–15.1)
ERYTHROCYTE [DISTWIDTH] IN BLOOD BY AUTOMATED COUNT: 17.5 % (ref 11.6–15.1)
EXT PREGNANCY TEST URINE: NEGATIVE
EXT. CONTROL: NORMAL
FLUAV RNA RESP QL NAA+PROBE: NEGATIVE
FLUBV RNA RESP QL NAA+PROBE: NEGATIVE
GFR SERPL CREATININE-BSD FRML MDRD: 122 ML/MIN/1.73SQ M
GFR SERPL CREATININE-BSD FRML MDRD: 93 ML/MIN/1.73SQ M
GLUCOSE SERPL-MCNC: 104 MG/DL (ref 65–140)
GLUCOSE SERPL-MCNC: 113 MG/DL (ref 65–140)
GLUCOSE UR STRIP-MCNC: NEGATIVE MG/DL
HCT VFR BLD AUTO: 29.3 % (ref 34.8–46.1)
HCT VFR BLD AUTO: 34.7 % (ref 34.8–46.1)
HGB BLD-MCNC: 11.2 G/DL (ref 11.5–15.4)
HGB BLD-MCNC: 9.2 G/DL (ref 11.5–15.4)
HGB UR QL STRIP.AUTO: ABNORMAL
IMM GRANULOCYTES # BLD AUTO: 0.05 THOUSAND/UL (ref 0–0.2)
IMM GRANULOCYTES # BLD AUTO: 0.07 THOUSAND/UL (ref 0–0.2)
IMM GRANULOCYTES NFR BLD AUTO: 0 % (ref 0–2)
IMM GRANULOCYTES NFR BLD AUTO: 1 % (ref 0–2)
KETONES UR STRIP-MCNC: ABNORMAL MG/DL
LEUKOCYTE ESTERASE UR QL STRIP: ABNORMAL
LIPASE SERPL-CCNC: 13 U/L (ref 11–82)
LYMPHOCYTES # BLD AUTO: 0.72 THOUSANDS/ÂΜL (ref 0.6–4.47)
LYMPHOCYTES # BLD AUTO: 1.28 THOUSANDS/ÂΜL (ref 0.6–4.47)
LYMPHOCYTES NFR BLD AUTO: 13 % (ref 14–44)
LYMPHOCYTES NFR BLD AUTO: 6 % (ref 14–44)
MCH RBC QN AUTO: 24.5 PG (ref 26.8–34.3)
MCH RBC QN AUTO: 24.7 PG (ref 26.8–34.3)
MCHC RBC AUTO-ENTMCNC: 31.4 G/DL (ref 31.4–37.4)
MCHC RBC AUTO-ENTMCNC: 32.3 G/DL (ref 31.4–37.4)
MCV RBC AUTO: 76 FL (ref 82–98)
MCV RBC AUTO: 78 FL (ref 82–98)
MONOCYTES # BLD AUTO: 1.31 THOUSAND/ÂΜL (ref 0.17–1.22)
MONOCYTES # BLD AUTO: 1.48 THOUSAND/ÂΜL (ref 0.17–1.22)
MONOCYTES NFR BLD AUTO: 10 % (ref 4–12)
MONOCYTES NFR BLD AUTO: 15 % (ref 4–12)
MUCOUS THREADS UR QL AUTO: ABNORMAL
NEUTROPHILS # BLD AUTO: 10.95 THOUSANDS/ÂΜL (ref 1.85–7.62)
NEUTROPHILS # BLD AUTO: 7.02 THOUSANDS/ÂΜL (ref 1.85–7.62)
NEUTS SEG NFR BLD AUTO: 71 % (ref 43–75)
NEUTS SEG NFR BLD AUTO: 84 % (ref 43–75)
NITRITE UR QL STRIP: NEGATIVE
NON-SQ EPI CELLS URNS QL MICRO: ABNORMAL /HPF
NRBC BLD AUTO-RTO: 0 /100 WBCS
NRBC BLD AUTO-RTO: 0 /100 WBCS
PH UR STRIP.AUTO: 6 [PH]
PLATELET # BLD AUTO: 244 THOUSANDS/UL (ref 149–390)
PLATELET # BLD AUTO: 305 THOUSANDS/UL (ref 149–390)
PMV BLD AUTO: 10.2 FL (ref 8.9–12.7)
PMV BLD AUTO: 10.3 FL (ref 8.9–12.7)
POTASSIUM SERPL-SCNC: 3.3 MMOL/L (ref 3.5–5.3)
POTASSIUM SERPL-SCNC: 3.4 MMOL/L (ref 3.5–5.3)
PROT SERPL-MCNC: 6.1 G/DL (ref 6.4–8.4)
PROT SERPL-MCNC: 7.7 G/DL (ref 6.4–8.4)
PROT UR STRIP-MCNC: ABNORMAL MG/DL
RBC # BLD AUTO: 3.76 MILLION/UL (ref 3.81–5.12)
RBC # BLD AUTO: 4.54 MILLION/UL (ref 3.81–5.12)
RBC #/AREA URNS AUTO: ABNORMAL /HPF
RSV RNA RESP QL NAA+PROBE: NEGATIVE
SARS-COV-2 RNA RESP QL NAA+PROBE: NEGATIVE
SODIUM SERPL-SCNC: 135 MMOL/L (ref 135–147)
SODIUM SERPL-SCNC: 135 MMOL/L (ref 135–147)
SP GR UR STRIP.AUTO: 1.02 (ref 1–1.03)
UROBILINOGEN UR STRIP-ACNC: <2 MG/DL
WBC # BLD AUTO: 13.06 THOUSAND/UL (ref 4.31–10.16)
WBC # BLD AUTO: 9.88 THOUSAND/UL (ref 4.31–10.16)
WBC #/AREA URNS AUTO: ABNORMAL /HPF

## 2024-09-04 PROCEDURE — 99283 EMERGENCY DEPT VISIT LOW MDM: CPT

## 2024-09-04 PROCEDURE — 80053 COMPREHEN METABOLIC PANEL: CPT

## 2024-09-04 PROCEDURE — 85025 COMPLETE CBC W/AUTO DIFF WBC: CPT

## 2024-09-04 PROCEDURE — 74177 CT ABD & PELVIS W/CONTRAST: CPT

## 2024-09-04 PROCEDURE — 96361 HYDRATE IV INFUSION ADD-ON: CPT

## 2024-09-04 PROCEDURE — 96376 TX/PRO/DX INJ SAME DRUG ADON: CPT

## 2024-09-04 PROCEDURE — 99285 EMERGENCY DEPT VISIT HI MDM: CPT | Performed by: EMERGENCY MEDICINE

## 2024-09-04 PROCEDURE — 81025 URINE PREGNANCY TEST: CPT

## 2024-09-04 PROCEDURE — 96375 TX/PRO/DX INJ NEW DRUG ADDON: CPT

## 2024-09-04 PROCEDURE — 36415 COLL VENOUS BLD VENIPUNCTURE: CPT

## 2024-09-04 PROCEDURE — 96365 THER/PROPH/DIAG IV INF INIT: CPT

## 2024-09-04 PROCEDURE — 99284 EMERGENCY DEPT VISIT MOD MDM: CPT

## 2024-09-04 PROCEDURE — 81001 URINALYSIS AUTO W/SCOPE: CPT

## 2024-09-04 PROCEDURE — 0241U HB NFCT DS VIR RESP RNA 4 TRGT: CPT

## 2024-09-04 PROCEDURE — 83690 ASSAY OF LIPASE: CPT

## 2024-09-04 RX ORDER — ONDANSETRON 2 MG/ML
4 INJECTION INTRAMUSCULAR; INTRAVENOUS ONCE
Status: COMPLETED | OUTPATIENT
Start: 2024-09-04 | End: 2024-09-04

## 2024-09-04 RX ORDER — ONDANSETRON 4 MG/1
4 TABLET, FILM COATED ORAL EVERY 6 HOURS
Qty: 20 TABLET | Refills: 0 | Status: ON HOLD | OUTPATIENT
Start: 2024-09-04 | End: 2024-09-07

## 2024-09-04 RX ORDER — KETOROLAC TROMETHAMINE 30 MG/ML
15 INJECTION, SOLUTION INTRAMUSCULAR; INTRAVENOUS ONCE
Status: COMPLETED | OUTPATIENT
Start: 2024-09-04 | End: 2024-09-04

## 2024-09-04 RX ORDER — CEFTRIAXONE 1 G/50ML
1000 INJECTION, SOLUTION INTRAVENOUS ONCE
Status: COMPLETED | OUTPATIENT
Start: 2024-09-04 | End: 2024-09-04

## 2024-09-04 RX ADMIN — KETOROLAC TROMETHAMINE 15 MG: 30 INJECTION, SOLUTION INTRAMUSCULAR at 02:28

## 2024-09-04 RX ADMIN — KETOROLAC TROMETHAMINE 15 MG: 30 INJECTION, SOLUTION INTRAMUSCULAR at 23:16

## 2024-09-04 RX ADMIN — ONDANSETRON 4 MG: 2 INJECTION INTRAMUSCULAR; INTRAVENOUS at 02:28

## 2024-09-04 RX ADMIN — SODIUM CHLORIDE, SODIUM LACTATE, POTASSIUM CHLORIDE, AND CALCIUM CHLORIDE 1000 ML: .6; .31; .03; .02 INJECTION, SOLUTION INTRAVENOUS at 23:16

## 2024-09-04 RX ADMIN — ONDANSETRON 4 MG: 2 INJECTION INTRAMUSCULAR; INTRAVENOUS at 04:14

## 2024-09-04 RX ADMIN — CEFTRIAXONE 1000 MG: 1 INJECTION, SOLUTION INTRAVENOUS at 03:18

## 2024-09-04 RX ADMIN — IOHEXOL 100 ML: 350 INJECTION, SOLUTION INTRAVENOUS at 23:54

## 2024-09-04 RX ADMIN — SODIUM CHLORIDE 1000 ML: 0.9 INJECTION, SOLUTION INTRAVENOUS at 02:28

## 2024-09-04 RX ADMIN — ONDANSETRON 4 MG: 2 INJECTION INTRAMUSCULAR; INTRAVENOUS at 23:16

## 2024-09-04 NOTE — DISCHARGE INSTRUCTIONS
Please  and take your antinausea medicine.  Do not exceed more than 3 doses in a day.  I would not be surprised if you continue to have nausea with some vomiting.  This should get slightly better with the nausea medicine and you should be able to tolerate some liquids and solids when you take the medicine.  Continue to take all antibiotics exactly as prescribed.  If you feel like you are getting significantly worsening come back to the emergency department again.

## 2024-09-04 NOTE — Clinical Note
Vannessa Birmingham was seen and treated in our emergency department on 9/4/2024.                Diagnosis:     Vannessa  is off the rest of the shift today.    She may return on this date:          If you have any questions or concerns, please don't hesitate to call.      Eloy Santos MD    ______________________________           _______________          _______________  Hospital Representative                              Date                                Time

## 2024-09-04 NOTE — LETTER
[facility] MED SURG 7  [address]  [place] [address]  Dept: [phone]    September 7, 2024     Patient: Vannessa Birmingham   YOB: 2000   Date of Visit: 9/4/2024       To Whom it May Concern:    Vannessa Birmingham is under my professional care. She was seen in the hospital from 9/3/2024 to 09/07/24. She may return to work on 9/12/24 without limitations.    If you have any questions or concerns, please don't hesitate to call.         Sincerely,          Carmela Hess, DO

## 2024-09-04 NOTE — ED PROVIDER NOTES
History  Chief Complaint   Patient presents with    Vomiting     Vomting since 630pm. [place] [facility] and told [place]o     This is a 24-year-old female who is presenting today with concern for repetitive episodes of vomiting.  Patient was seen at outside hospital yesterday where she was found to have pyelonephritis.  She received a single dose of antibiotic at the emergency department and went home and tried to sleep.  Around 6:30 PM, she awoke feeling very nauseous.  She had multiple episodes of vomiting and then began to feel like she was dehydrated.  This prompted her visit to the emergency department tonight.  I reviewed patient's lab work and imaging from her stay at the outside hospital.  Patient denies any severe worsening of her symptoms other than the vomiting.  She is denying significant abdominal pain at this time, or any new urinary symptoms.  Patient states that all attempts at drinking liquids over the past several hours have resulted in vomiting within minutes.  No known sick contacts at home.  Patient is denying any cough or congestion.  She is furthermore denying any diarrhea, sore throat, ear pain, leg swelling, chest pain or shortness of breath.        Prior to Admission Medications   Prescriptions Last Dose Informant Patient Reported? Taking?   cefdinir (OMNICEF) 300 mg capsule   No No   Sig: Take 1 capsule (300 mg total) by mouth every 12 (twelve) hours for 10 days   ibuprofen (MOTRIN) 800 mg tablet   No No   Sig: Take 1 tablet (800 mg total) by mouth 3 (three) times a day   Patient not taking: Reported on 2019      Facility-Administered Medications: None       Past Medical History:   Diagnosis Date     2017    Anemia        History reviewed. No pertinent surgical history.    History reviewed. No pertinent family history.  I have reviewed and agree with the history as documented.    E-Cigarette/Vaping    E-Cigarette Use Current Some Day User       E-Cigarette/Vaping Substances    Nicotine Yes      Social History     Tobacco Use    Smoking status: Former     Current packs/day: 0.00     Types: Cigarettes     Quit date:      Years since quittin.6    Smokeless tobacco: Never   Vaping Use    Vaping status: Some Days    Substances: Nicotine   Substance Use Topics    Alcohol use: No    Drug use: Yes     Types: Marijuana     Comment: last use        Review of Systems   Constitutional:  Positive for appetite change and fever. Negative for chills.   HENT:  Negative for ear pain and sore throat.    Eyes:  Negative for pain and visual disturbance.   Respiratory:  Negative for cough and shortness of breath.    Cardiovascular:  Negative for chest pain and palpitations.   Gastrointestinal:  Positive for nausea and vomiting. Negative for abdominal pain.   Genitourinary:  Positive for dysuria and frequency. Negative for hematuria.   Musculoskeletal:  Negative for arthralgias and back pain.   Skin:  Negative for color change and rash.   Neurological:  Negative for seizures and syncope.   All other systems reviewed and are negative.      Physical Exam  Physical Exam  Vitals and nursing note reviewed.   Constitutional:       General: She is not in acute distress.     Appearance: She is well-developed and normal weight.   HENT:      Head: Normocephalic and atraumatic.      Right Ear: External ear normal.      Left Ear: External ear normal.      Nose: Nose normal. No congestion or rhinorrhea.      Mouth/Throat:      Mouth: Mucous membranes are moist.      Pharynx: Oropharynx is clear. No oropharyngeal exudate or posterior oropharyngeal erythema.   Eyes:      General: No scleral icterus.     Extraocular Movements: Extraocular movements intact.      Conjunctiva/sclera: Conjunctivae normal.      Pupils: Pupils are equal, round, and reactive to light.   Cardiovascular:      Rate and Rhythm: Regular rhythm. Tachycardia present.      Pulses: Normal pulses.      Heart  sounds: Normal heart sounds. No murmur heard.  Pulmonary:      Effort: Pulmonary effort is normal. No respiratory distress.      Breath sounds: Normal breath sounds. No wheezing or rhonchi.   Abdominal:      General: Abdomen is flat. There is no distension.      Palpations: Abdomen is soft.      Tenderness: There is abdominal tenderness (Mild RLQ). There is no guarding.   Musculoskeletal:         General: No swelling.      Cervical back: Neck supple. No rigidity.      Right lower leg: No edema.      Left lower leg: No edema.   Lymphadenopathy:      Cervical: No cervical adenopathy.   Skin:     General: Skin is warm and dry.      Capillary Refill: Capillary refill takes less than 2 seconds.      Coloration: Skin is not jaundiced.      Findings: No rash.   Neurological:      General: No focal deficit present.      Mental Status: She is alert and oriented to person, place, and time. Mental status is at baseline.   Psychiatric:         Mood and Affect: Mood normal.         Behavior: Behavior normal.         Vital Signs  ED Triage Vitals   Temperature Pulse Respirations Blood Pressure SpO2   09/04/24 0217 09/04/24 0217 09/04/24 0217 09/04/24 0217 09/04/24 0217   (!) 101.5 °F (38.6 °C) 103 19 109/65 99 %      Temp Source Heart Rate Source Patient Position - Orthostatic VS BP Location FiO2 (%)   09/04/24 0217 09/04/24 0217 -- -- --   Temporal Monitor         Pain Score       09/04/24 0228       3           Vitals:    09/04/24 0217 09/04/24 0416   BP: 109/65 96/53   Pulse: 103 88         Visual Acuity      ED Medications  Medications   ondansetron (ZOFRAN) injection 4 mg (4 mg Intravenous Given 9/4/24 0228)   ketorolac (TORADOL) injection 15 mg (15 mg Intravenous Given 9/4/24 0228)   sodium chloride 0.9 % bolus 1,000 mL (0 mL Intravenous Stopped 9/4/24 0318)   cefTRIAXone (ROCEPHIN) IVPB (premix in dextrose) 1,000 mg 50 mL (0 mg Intravenous Stopped 9/4/24 0403)   ondansetron (ZOFRAN) injection 4 mg (4 mg Intravenous Given  9/4/24 0414)       Diagnostic Studies  Results Reviewed       Procedure Component Value Units Date/Time    POCT pregnancy, urine [[id number]]  (Normal) Resulted: 09/04/24 0434    Lab Status: Final result Updated: 09/04/24 0434     EXT Preg Test, Ur Negative     Control Valid    Urinalysis with microscopic [[id number]] Collected: 09/04/24 0434    Lab Status: No result Specimen: Urine, Clean Catch     FLU/RSV/COVID - if FLU/RSV clinically relevant [[id number]]  (Normal) Collected: 09/04/24 0237    Lab Status: Final result Specimen: Nares from Nose Updated: 09/04/24 0326     SARS-CoV-2 Negative     INFLUENZA A PCR Negative     INFLUENZA B PCR Negative     RSV PCR Negative    Narrative:      This test has been performed using the CoV-2/Flu/RSV plus assay on the [employer] platform. This test has been validated by the  and verified by the performing laboratory.     This test is designed to amplify and detect the following: nucleocapsid (N), envelope (E), and RNA-dependent RNA polymerase (RdRP) genes of the SARS-CoV-2 genome; matrix (M), basic polymerase (PB2), and acidic protein (PA) segments of the influenza A genome; matrix (M) and non-structural protein (NS) segments of the influenza B genome, and the nucleocapsid genes of RSV A and RSV B.     Positive results are indicative of the presence of Flu A, Flu B, RSV, and/or SARS-CoV-2 RNA. Positive results for SARS-CoV-2 or suspected novel influenza should be reported to state, local, or federal health departments according to local reporting requirements.      All results should be assessed in conjunction with clinical presentation and other laboratory markers for clinical management.     FOR PEDIATRIC PATIENTS - copy/paste COVID Guidelines URL to browser: [email or web address]       Comprehensive metabolic panel [[id number]]  (Abnormal) Collected: 09/04/24 0236    Lab Status: Final result Specimen: Blood  from Arm, Left Updated: 09/04/24 0306     Sodium 135 mmol/L      Potassium 3.3 mmol/L      Chloride 102 mmol/L      CO2 19 mmol/L      ANION GAP 14 mmol/L      BUN 10 mg/dL      Creatinine 0.87 mg/dL      Glucose 113 mg/dL      Calcium 9.5 mg/dL      AST 13 U/L      ALT 7 U/L      Alkaline Phosphatase 50 U/L      Total Protein 7.7 g/dL      Albumin 4.3 g/dL      Total Bilirubin 0.64 mg/dL      eGFR 93 ml/min/1.73sq m     Narrative:      National Kidney Disease Foundation guidelines for Chronic Kidney Disease (CKD):     Stage 1 with normal or high GFR (GFR > 90 mL/min/1.73 square meters)    Stage 2 Mild CKD (GFR = 60-89 mL/min/1.73 square meters)    Stage 3A Moderate CKD (GFR = 45-59 mL/min/1.73 square meters)    Stage 3B Moderate CKD (GFR = 30-44 mL/min/1.73 square meters)    Stage 4 Severe CKD (GFR = 15-29 mL/min/1.73 square meters)    Stage 5 End Stage CKD (GFR <15 mL/min/1.73 square meters)  Note: GFR calculation is accurate only with a steady state creatinine    Lipase [[id number]]  (Normal) Collected: 09/04/24 0236    Lab Status: Final result Specimen: Blood from Arm, Left Updated: 09/04/24 0306     Lipase 13 u/L     CBC and differential [[id number]]  (Abnormal) Collected: 09/04/24 0236    Lab Status: Final result Specimen: Blood from Arm, Left Updated: 09/04/24 0249     WBC 13.06 Thousand/uL      RBC 4.54 Million/uL      Hemoglobin 11.2 g/dL      Hematocrit 34.7 %      MCV 76 fL      MCH 24.7 pg      MCHC 32.3 g/dL      RDW 17.1 %      MPV 10.3 fL      Platelets 305 Thousands/uL      nRBC 0 /100 WBCs      Segmented % 84 %      Immature Grans % 0 %      Lymphocytes % 6 %      Monocytes % 10 %      Eosinophils Relative 0 %      Basophils Relative 0 %      Absolute Neutrophils 10.95 Thousands/µL      Absolute Immature Grans 0.05 Thousand/uL      Absolute Lymphocytes 0.72 Thousands/µL      Absolute Monocytes 1.31 Thousand/µL      Eosinophils Absolute 0.00 Thousand/µL      Basophils Absolute 0.03 Thousands/µL                     No orders to display              Procedures  Procedures         ED Course  ED Course as of 09/04/24 0444   Wed Sep 04, 2024   0329 Comprehensive metabolic panel(!)  Slight lab abnormalities are likely secondary to significant vomiting prior to arrival.                                 SBIRT 20yo+      Flowsheet Row Most Recent Value   Initial Alcohol Screen: US AUDIT-C     1. How often do you have a drink containing alcohol? 0 Filed at: 09/04/2024 0217   Audit-C Score 0 Filed at: 09/04/2024 0217                      Medical Decision Making  Medical complexity: 24-year-old female with known diagnosis of pyelonephritis, presenting now with multiple episodes of vomiting.  Does not appear dehydrated on examination but is slightly tachycardic with fever.  This does fit with the patient's diagnosis of pyelonephritis.  Patient is denying any other signs and symptoms of new infection since being diagnosed earlier today.  However given significant amount of vomiting, will need IV access, IV fluids, IV antiemetic, and will screen lab work for severe worsening of her infection and for signs of systemic infection.  These would include leukocytosis or left shift.  Will also evaluate metabolites for signs of ZEINA or other metabolic disturbance secondary to her vomiting.    Reassessment/disposition: There is a mild anion gap acidosis likely from her vomiting.  She is slightly hypokalemia which also fits with her vomiting.  She is tolerating p.o. here in the emergency department after antiemetic.  Will continue to reassess.  No indication for imaging at this time.    Disposition: Patient is stable for discharge at this time as she has been tolerating p.o. without repetitive episodes of vomiting for more than 2 hours here in the ED.  She was given prescription for Zofran and told to follow-up with her primary doctor regarding her ongoing symptoms later this week.  However if her symptoms were to severely worsen, if she  was unable to tolerate p.o., or if she were to develop severe abdominal pain then she will need to come back to the emergency department immediately.  Patient expressed understanding of these return precautions and was discharged with significantly improved vital signs, and tolerating p.o. with only mild pain.    Amount and/or Complexity of Data Reviewed  Labs: ordered. Decision-making details documented in ED Course.    Risk  Prescription drug management.                 Disposition  Final diagnoses:   Fever   Pyelonephritis   Nausea and vomiting     Time reflects when diagnosis was documented in both MDM as applicable and the Disposition within this note       Time User Action Codes Description Comment    9/4/2024  4:20 AM Eloy Santos [R50.9] Fever     9/4/2024  4:20 AM Eloy Santos [N12] Pyelonephritis     9/4/2024  4:20 AM Eloy Santos [R11.2] Nausea and vomiting           ED Disposition       ED Disposition   Discharge    Condition   Stable    Date/Time   Wed Sep 4, 2024 0420    Comment   Vannessa Birmingham discharge to home/self care.                   Follow-up Information       Follow up With Specialties Details Why Contact Info Additional Information    [facility] Emergency Department Emergency Medicine Go to  If symptoms worsen, As needed [facility] [id number]  [phone] [facility] Emergency Department, [address], [place], [address]    Hafsa Lawson,  Family Medicine In 3 days  [address] [place] [id number]  [phone]               Discharge Medication List as of 9/4/2024  4:22 AM        START taking these medications    Details   ondansetron (ZOFRAN) 4 mg tablet Take 1 tablet (4 mg total) by mouth every 6 (six) hours, Starting Wed 9/4/2024, Normal           CONTINUE these medications which have NOT CHANGED    Details   cefdinir (OMNICEF) 300 mg capsule Take 1 capsule (300  mg total) by mouth every 12 (twelve) hours for 10 days, Starting Tue 9/3/2024, Until Fri 9/13/2024, Normal      ibuprofen (MOTRIN) 800 mg tablet Take 1 tablet (800 mg total) by mouth 3 (three) times a day, Starting u 5/30/2019, Normal             No discharge procedures on file.    PDMP Review       None            ED Provider  Electronically Signed by             Eloy Santos MD  09/04/24 4652

## 2024-09-05 PROBLEM — N15.1 RENAL ABSCESS: Status: ACTIVE | Noted: 2024-09-05

## 2024-09-05 PROBLEM — D64.9 ANEMIA: Status: ACTIVE | Noted: 2024-09-05

## 2024-09-05 PROBLEM — N12 PYELONEPHRITIS: Status: ACTIVE | Noted: 2024-09-05

## 2024-09-05 PROBLEM — K21.9 GERD (GASTROESOPHAGEAL REFLUX DISEASE): Status: ACTIVE | Noted: 2024-09-05

## 2024-09-05 PROBLEM — E87.6 HYPOKALEMIA: Status: ACTIVE | Noted: 2024-09-05

## 2024-09-05 PROBLEM — Z87.891 HISTORY OF NICOTINE VAPING: Status: ACTIVE | Noted: 2024-09-05

## 2024-09-05 LAB
ANION GAP SERPL CALCULATED.3IONS-SCNC: 8 MMOL/L (ref 4–13)
BACTERIA UR CULT: ABNORMAL
BASOPHILS # BLD AUTO: 0.03 THOUSANDS/ÂΜL (ref 0–0.1)
BASOPHILS NFR BLD AUTO: 0 % (ref 0–1)
BUN SERPL-MCNC: 6 MG/DL (ref 5–25)
CALCIUM SERPL-MCNC: 8.2 MG/DL (ref 8.4–10.2)
CHLORIDE SERPL-SCNC: 106 MMOL/L (ref 96–108)
CO2 SERPL-SCNC: 23 MMOL/L (ref 21–32)
CREAT SERPL-MCNC: 0.65 MG/DL (ref 0.6–1.3)
EOSINOPHIL # BLD AUTO: 0.04 THOUSAND/ÂΜL (ref 0–0.61)
EOSINOPHIL NFR BLD AUTO: 1 % (ref 0–6)
ERYTHROCYTE [DISTWIDTH] IN BLOOD BY AUTOMATED COUNT: 18 % (ref 11.6–15.1)
ERYTHROCYTE [SEDIMENTATION RATE] IN BLOOD: 19 MM/HOUR (ref 0–19)
FERRITIN SERPL-MCNC: 31 NG/ML (ref 11–307)
GFR SERPL CREATININE-BSD FRML MDRD: 124 ML/MIN/1.73SQ M
GLUCOSE SERPL-MCNC: 87 MG/DL (ref 65–140)
HCT VFR BLD AUTO: 28.7 % (ref 34.8–46.1)
HGB BLD-MCNC: 8.8 G/DL (ref 11.5–15.4)
IMM GRANULOCYTES # BLD AUTO: 0.03 THOUSAND/UL (ref 0–0.2)
IMM GRANULOCYTES NFR BLD AUTO: 0 % (ref 0–2)
IRON SERPL-MCNC: <10 UG/DL (ref 50–212)
LYMPHOCYTES # BLD AUTO: 2.36 THOUSANDS/ÂΜL (ref 0.6–4.47)
LYMPHOCYTES NFR BLD AUTO: 29 % (ref 14–44)
MCH RBC QN AUTO: 25.1 PG (ref 26.8–34.3)
MCHC RBC AUTO-ENTMCNC: 30.7 G/DL (ref 31.4–37.4)
MCV RBC AUTO: 82 FL (ref 82–98)
MONOCYTES # BLD AUTO: 1.18 THOUSAND/ÂΜL (ref 0.17–1.22)
MONOCYTES NFR BLD AUTO: 15 % (ref 4–12)
NEUTROPHILS # BLD AUTO: 4.42 THOUSANDS/ÂΜL (ref 1.85–7.62)
NEUTS SEG NFR BLD AUTO: 55 % (ref 43–75)
NRBC BLD AUTO-RTO: 0 /100 WBCS
PLATELET # BLD AUTO: 210 THOUSANDS/UL (ref 149–390)
PLATELET # BLD AUTO: 232 THOUSANDS/UL (ref 149–390)
PMV BLD AUTO: 10.6 FL (ref 8.9–12.7)
PMV BLD AUTO: 11.7 FL (ref 8.9–12.7)
POTASSIUM SERPL-SCNC: 3.5 MMOL/L (ref 3.5–5.3)
RBC # BLD AUTO: 3.51 MILLION/UL (ref 3.81–5.12)
RETICS # AUTO: NORMAL 10*3/UL (ref 14097–95744)
RETICS # CALC: 0.92 % (ref 0.37–1.87)
SODIUM SERPL-SCNC: 137 MMOL/L (ref 135–147)
UIBC SERPL-MCNC: 289 UG/DL (ref 155–355)
WBC # BLD AUTO: 8.06 THOUSAND/UL (ref 4.31–10.16)

## 2024-09-05 PROCEDURE — 83540 ASSAY OF IRON: CPT

## 2024-09-05 PROCEDURE — 80048 BASIC METABOLIC PNL TOTAL CA: CPT

## 2024-09-05 PROCEDURE — 85652 RBC SED RATE AUTOMATED: CPT

## 2024-09-05 PROCEDURE — 82728 ASSAY OF FERRITIN: CPT

## 2024-09-05 PROCEDURE — NC001 PR NO CHARGE: Performed by: HOSPITALIST

## 2024-09-05 PROCEDURE — 99223 1ST HOSP IP/OBS HIGH 75: CPT | Performed by: HOSPITALIST

## 2024-09-05 PROCEDURE — 36415 COLL VENOUS BLD VENIPUNCTURE: CPT

## 2024-09-05 PROCEDURE — 87040 BLOOD CULTURE FOR BACTERIA: CPT

## 2024-09-05 PROCEDURE — 85049 AUTOMATED PLATELET COUNT: CPT

## 2024-09-05 PROCEDURE — 83550 IRON BINDING TEST: CPT

## 2024-09-05 PROCEDURE — 85025 COMPLETE CBC W/AUTO DIFF WBC: CPT

## 2024-09-05 PROCEDURE — 96375 TX/PRO/DX INJ NEW DRUG ADDON: CPT

## 2024-09-05 PROCEDURE — 85045 AUTOMATED RETICULOCYTE COUNT: CPT

## 2024-09-05 RX ORDER — SODIUM CHLORIDE, SODIUM GLUCONATE, SODIUM ACETATE, POTASSIUM CHLORIDE, MAGNESIUM CHLORIDE, SODIUM PHOSPHATE, DIBASIC, AND POTASSIUM PHOSPHATE .53; .5; .37; .037; .03; .012; .00082 G/100ML; G/100ML; G/100ML; G/100ML; G/100ML; G/100ML; G/100ML
100 INJECTION, SOLUTION INTRAVENOUS CONTINUOUS
Status: DISPENSED | OUTPATIENT
Start: 2024-09-05 | End: 2024-09-07

## 2024-09-05 RX ORDER — OXYCODONE HYDROCHLORIDE 5 MG/1
5 TABLET ORAL EVERY 4 HOURS PRN
Status: DISCONTINUED | OUTPATIENT
Start: 2024-09-05 | End: 2024-09-07 | Stop reason: HOSPADM

## 2024-09-05 RX ORDER — HYDROMORPHONE HCL IN WATER/PF 6 MG/30 ML
0.2 PATIENT CONTROLLED ANALGESIA SYRINGE INTRAVENOUS EVERY 2 HOUR PRN
Status: DISCONTINUED | OUTPATIENT
Start: 2024-09-05 | End: 2024-09-07 | Stop reason: HOSPADM

## 2024-09-05 RX ORDER — SODIUM CHLORIDE, SODIUM GLUCONATE, SODIUM ACETATE, POTASSIUM CHLORIDE, MAGNESIUM CHLORIDE, SODIUM PHOSPHATE, DIBASIC, AND POTASSIUM PHOSPHATE .53; .5; .37; .037; .03; .012; .00082 G/100ML; G/100ML; G/100ML; G/100ML; G/100ML; G/100ML; G/100ML
100 INJECTION, SOLUTION INTRAVENOUS CONTINUOUS
Status: DISCONTINUED | OUTPATIENT
Start: 2024-09-05 | End: 2024-09-05

## 2024-09-05 RX ORDER — ACETAMINOPHEN 325 MG/1
975 TABLET ORAL EVERY 6 HOURS PRN
Status: DISCONTINUED | OUTPATIENT
Start: 2024-09-05 | End: 2024-09-07 | Stop reason: HOSPADM

## 2024-09-05 RX ORDER — SODIUM CHLORIDE, SODIUM GLUCONATE, SODIUM ACETATE, POTASSIUM CHLORIDE, MAGNESIUM CHLORIDE, SODIUM PHOSPHATE, DIBASIC, AND POTASSIUM PHOSPHATE .53; .5; .37; .037; .03; .012; .00082 G/100ML; G/100ML; G/100ML; G/100ML; G/100ML; G/100ML; G/100ML
100 INJECTION, SOLUTION INTRAVENOUS CONTINUOUS
Status: DISPENSED | OUTPATIENT
Start: 2024-09-05 | End: 2024-09-05

## 2024-09-05 RX ORDER — KETOROLAC TROMETHAMINE 30 MG/ML
15 INJECTION, SOLUTION INTRAMUSCULAR; INTRAVENOUS EVERY 6 HOURS PRN
Status: DISCONTINUED | OUTPATIENT
Start: 2024-09-05 | End: 2024-09-05

## 2024-09-05 RX ORDER — POTASSIUM CHLORIDE 1500 MG/1
20 TABLET, EXTENDED RELEASE ORAL ONCE
Status: COMPLETED | OUTPATIENT
Start: 2024-09-05 | End: 2024-09-05

## 2024-09-05 RX ORDER — SODIUM CHLORIDE, SODIUM GLUCONATE, SODIUM ACETATE, POTASSIUM CHLORIDE, MAGNESIUM CHLORIDE, SODIUM PHOSPHATE, DIBASIC, AND POTASSIUM PHOSPHATE .53; .5; .37; .037; .03; .012; .00082 G/100ML; G/100ML; G/100ML; G/100ML; G/100ML; G/100ML; G/100ML
100 INJECTION, SOLUTION INTRAVENOUS CONTINUOUS
Status: CANCELLED | OUTPATIENT
Start: 2024-09-05 | End: 2024-09-06

## 2024-09-05 RX ORDER — ONDANSETRON 2 MG/ML
4 INJECTION INTRAMUSCULAR; INTRAVENOUS EVERY 6 HOURS PRN
Status: DISCONTINUED | OUTPATIENT
Start: 2024-09-05 | End: 2024-09-07 | Stop reason: HOSPADM

## 2024-09-05 RX ORDER — NICOTINE 21 MG/24HR
14 PATCH, TRANSDERMAL 24 HOURS TRANSDERMAL DAILY
Status: DISCONTINUED | OUTPATIENT
Start: 2024-09-05 | End: 2024-09-07 | Stop reason: HOSPADM

## 2024-09-05 RX ORDER — ACETAMINOPHEN 325 MG/1
650 TABLET ORAL EVERY 6 HOURS PRN
Status: DISCONTINUED | OUTPATIENT
Start: 2024-09-05 | End: 2024-09-05

## 2024-09-05 RX ORDER — FAMOTIDINE 20 MG/1
20 TABLET, FILM COATED ORAL
Status: DISCONTINUED | OUTPATIENT
Start: 2024-09-05 | End: 2024-09-07 | Stop reason: HOSPADM

## 2024-09-05 RX ORDER — ENOXAPARIN SODIUM 100 MG/ML
40 INJECTION SUBCUTANEOUS DAILY
Status: DISCONTINUED | OUTPATIENT
Start: 2024-09-05 | End: 2024-09-07 | Stop reason: HOSPADM

## 2024-09-05 RX ORDER — ONDANSETRON 4 MG/1
4 TABLET, ORALLY DISINTEGRATING ORAL EVERY 6 HOURS PRN
Status: DISCONTINUED | OUTPATIENT
Start: 2024-09-05 | End: 2024-09-05

## 2024-09-05 RX ORDER — DIPHENHYDRAMINE HCL 25 MG
25 TABLET ORAL ONCE
Status: COMPLETED | OUTPATIENT
Start: 2024-09-05 | End: 2024-09-05

## 2024-09-05 RX ADMIN — DIPHENHYDRAMINE HCL 25 MG: 25 TABLET ORAL at 20:43

## 2024-09-05 RX ADMIN — ONDANSETRON 4 MG: 2 INJECTION INTRAMUSCULAR; INTRAVENOUS at 21:53

## 2024-09-05 RX ADMIN — CEFTRIAXONE SODIUM 2000 MG: 10 INJECTION, POWDER, FOR SOLUTION INTRAVENOUS at 23:57

## 2024-09-05 RX ADMIN — Medication 2.5 MG: at 18:31

## 2024-09-05 RX ADMIN — ONDANSETRON 4 MG: 2 INJECTION INTRAMUSCULAR; INTRAVENOUS at 17:05

## 2024-09-05 RX ADMIN — SODIUM CHLORIDE, SODIUM GLUCONATE, SODIUM ACETATE, POTASSIUM CHLORIDE, MAGNESIUM CHLORIDE, SODIUM PHOSPHATE, DIBASIC, AND POTASSIUM PHOSPHATE 100 ML/HR: .53; .5; .37; .037; .03; .012; .00082 INJECTION, SOLUTION INTRAVENOUS at 02:48

## 2024-09-05 RX ADMIN — ENOXAPARIN SODIUM 40 MG: 40 INJECTION SUBCUTANEOUS at 08:53

## 2024-09-05 RX ADMIN — IRON SUCROSE 300 MG: 20 INJECTION, SOLUTION INTRAVENOUS at 18:22

## 2024-09-05 RX ADMIN — ACETAMINOPHEN 975 MG: 325 TABLET ORAL at 08:54

## 2024-09-05 RX ADMIN — SODIUM CHLORIDE, SODIUM GLUCONATE, SODIUM ACETATE, POTASSIUM CHLORIDE, MAGNESIUM CHLORIDE, SODIUM PHOSPHATE, DIBASIC, AND POTASSIUM PHOSPHATE 100 ML/HR: .53; .5; .37; .037; .03; .012; .00082 INJECTION, SOLUTION INTRAVENOUS at 16:54

## 2024-09-05 RX ADMIN — FAMOTIDINE 20 MG: 20 TABLET, FILM COATED ORAL at 08:50

## 2024-09-05 RX ADMIN — HYDROMORPHONE HYDROCHLORIDE 0.2 MG: 0.2 INJECTION, SOLUTION INTRAMUSCULAR; INTRAVENOUS; SUBCUTANEOUS at 21:53

## 2024-09-05 RX ADMIN — CEFTRIAXONE SODIUM 1000 MG: 10 INJECTION, POWDER, FOR SOLUTION INTRAVENOUS at 01:29

## 2024-09-05 RX ADMIN — ONDANSETRON 4 MG: 2 INJECTION INTRAMUSCULAR; INTRAVENOUS at 08:50

## 2024-09-05 RX ADMIN — POTASSIUM CHLORIDE 20 MEQ: 1500 TABLET, EXTENDED RELEASE ORAL at 04:17

## 2024-09-05 NOTE — ASSESSMENT & PLAN NOTE
Patient reports vaping nicotine every day.     Follow-up with pcp outpt regarding smoking cessation.

## 2024-09-05 NOTE — ASSESSMENT & PLAN NOTE
Patiently initially presented to [facility] 9/3 ED for UTI with CT only showing cystitis discharged with cefdinir completed 2 days of 10-day course.  Patient ultimately returned on 9/4 to B with repeat CT on 9/4 showing new  mild heterogeneous bilateral renal parenchymal enhancement with minimal perinephric stranding suspicious for acute pyelonephritis and cystitis. UCX positive E coli pan sensitivities. Admitted on Cefepime 1d and transitioned to CTX (SOT 9/4) for total of 3d treatment. BCX NG 48hrs. As patient remained AF and HD stable pt dc on Levaquin for 12d.    Urine Cx resulted with pan-sensitivity to abx  Lipase neg    Plan  Given concern for renal abscess, will continue Levaquin 500mg qd for 12days (SOT 9/7)  Dc on small amount prn opoids given severe flank pain  Prn zofran on dc  Pt informed to return to ed if worsening pain / fever / overall feeling sick  Will need follow-up with PCP to est care

## 2024-09-05 NOTE — H&P
INTERNAL MEDICINE RESIDENCY ADMISSION H&P     Name: Vannessa Birmingham   Age & Sex: 24 y.o. female   MRN: [id number]  Unit/Bed#: -01   Encounter: [id number]  Primary Care Provider: No primary care provider on file.    Code Status: Level 1 - Full Code  Admission Status: INPATIENT   Disposition: Patient requires Med/Surg    Admit to team: SOD Team A    ASSESSMENT/PLAN     Principal Problem:    Pyelonephritis  Active Problems:    Renal abscess    Anemia    Hypokalemia    History of nicotine vaping      * Pyelonephritis  Assessment & Plan  Patient originally presented to outside emergency department approximately 2 days ago with complaints of back pain, flank pain, and fatigue, predominantly on the right side.  At least at that time she denied any systemic symptoms or urinary complaints.  Studies from that time notable for mild leukocytosis of 13.6 with UA showing 2+ leukocytes, positive nitrites, 2+ occult blood and microscopy showing 4-10 RBC, innumerable WBC and moderate bacteria.  CT imaging from that admission showed mild nonspecific circumferential bladder wall thickening suggestive of cystitis and patient was ultimately discharged home on a 10-day course of cefdinir.  It should be noted that urine culture obtained during this time has preliminary growth of 80-89K E. coli with susceptibilities pending as of time of writing.  Patient returned to a different emergency department early in the morning of the following day (9/4) with ongoing symptoms, particularly nausea.  During this evaluation, she was given Zofran and IV fluids with some improvement in her symptoms and she was encouraged to follow-up with her PCP for ongoing care.  She returned to our emergency department later in the day with further symptom recurrence as well as spreading of her symptoms to the left flank.  Interestingly, her CBC shows interval improvement of her leukocytosis to 9.88 with decreased percentage of neutrophils;  however, patient again noted to be febrile and repeat imaging now noted mild bilateral renal parenchymal enhancement with some adjacent perinephric stranding.  Also noted was a complex hypodense area with ill-defined margins in the right upper renal pole concerning for developing abscess also noted were ongoing bladder wall thickening and diffuse bilateral ureteral wall thickening and enhancement.    Patient was given 1 dose of ceftriaxone prior to admission.  She was also given Toradol, Zofran, and 1 L fluid bolus.    Plan  -Given concern for renal abscess, will continue ceftriaxone which previous cultures have been sensitive to  -Blood culture sensitivities pending, will guide further antibiotic treatment  -Patient currently on as needed Tylenol 650 mg every 6 abdominal pain and IV Toradol 50 mg every 6 as needed for moderate pain  -Patient describes 2 to 3-day history of anorexia, with 2-day history of nausea/vomiting, will place on 100 mL Isolyte maintenance fluids as she is having difficulty with oral intake, advance diet as tolerated  -Zofran every 6 hours as needed for nausea/vomiting  -Blood cultures ordered, however should be noted that the patient has received multiple days of oral and IV cephalosporin antibiotic treatment which may affect reliability of results  -PT/OT, case management consult in place        Renal abscess  Assessment & Plan  Patient with 2-day history of back and right flank pain, anorexia, fatigue, that has developed fever/chills and worsening abdominal pain now on the left flank with imaging on 9/3 that was indicative of mild bladder wall thickening and associated cystitis.  Repeat CT abdomen/pelvis on 9/4 showed evidence of mild heterogeneous bilateral renal parenchymal enhancement with minimal perinephric stranding suspicious for acute pyelonephritis, complex hypodense area measuring 2.2 x 1.0 x 1.4 cm with ill-defined margins in the right renal upper pole suspicious for a developing  abscess in this setting. Follow-up is recommended, circumferential wall thickening of the urinary bladder consistent with cystitis, and diffuse bilateral ureteral wall thickening and enhancement likely on the basis of infectious ureteritis.  Imaging suggestive of advancing disease and newly developing abscess.    Plan:  -Patient will have antibiotics escalated to cefepime with further antibiotic regimen to be guided by urine culture sensitivities  -At this time we will not consult IR for drainage given the size of developing abscess, can consider consultation for drainage in the future if necessary    Anemia  Assessment & Plan  Patient noted to have microcytic anemia on most recent blood work obtained during admission, with hemoglobin 9.2 and MCV 78.  On chart review, it appears that patient is chronically anemic with baseline hemoglobin appearing to range from 9-11 since 2018.  Recent drop from 11.2 to 9.2 with no obvious signs of bleeding and likely secondary to recent volume supplementation which over the course last 2 days totals over 2.5 L.  Given demographics, suspect secondary to iron deficiency.    Plan  -Monitor CBC  -Consider eventual iron supplementation    Hypokalemia  Assessment & Plan  Mild hypokalemia at 3.4 likely as result of recent anorexia and nausea/vomiting.  Patient provided with 20 mEq KCl.  Currently on 100 mL of Isolyte maintenance fluid.  Continue trending on morning labs.    History of nicotine vaping  Assessment & Plan  Patient reports vaping nicotine every day.  Reports desire for nicotine replacement while in the hospital.  Have started patient grams nicotine replacement patch.        VTE Pharmacologic Prophylaxis: Enoxaparin (Lovenox)  VTE Mechanical Prophylaxis: sequential compression device    CHIEF COMPLAINT     Chief Complaint   Patient presents with    Abdominal Pain     Pt c/o generalized abdominal pain and generalized body aches x3 days. Reports fevers, N/V. Denies urinary  symptoms       HISTORY OF PRESENT ILLNESS     Vannessa Birmingham is a 24-year-old female with history of pansensitive E. coli UTI in February of 2023 and anemia who presents due to worsening abdominal pain.  The patient originally presented to the emergency department in the morning on 9/3 due to 24 hours duration of fatigue and back/flank pain, with associated anorexia and without associated fevers/chills or nausea/vomiting.  At this visit she was afebrile, with unremarkable vitals.  Lab work displayed evidence of leukocytosis of 13.64, hemoglobin of 1.4 with MCV of 80, unremarkable CMP, urinalysis showed evidence of bacteria and leukocytes, with a urine culture that grew 80-90,000 E. coli with sensitivities pending.  CT imaging showed evidence of mild bladder wall thickening suggestive of cystitis at this time.  The patient was treated with IV fluids, 1 dose of ceftriaxone, and Toradol, and was discharged on 10-day duration of cefdinir 300 mg twice daily for a urinary tract infection.  In the evening after discharge on 9/3 the patient began developing nausea/vomiting and noticed fevers/chills which made her feel dehydrated in addition to her abdominal and flank tenderness which led her to return to the emergency department.  During this visit it was noted that she was febrile at 101.5, tachycardic with heart rate of 103, and otherwise had respiratory rate of 19 and a blood pressure 109/65.  Lab results again showed evidence of leukocytosis with white blood cell count of 13.06, and microcytic anemia with hemoglobin 11.2.  Lipase was negative.  She was treated with IV Toradol, Zofran, and another dose of ceftriaxone.  Patient was discharged after being able to tolerate oral intake and was given Zofran with instructions to continue oral cefdinir.    The patient presents again in the evening on 9/4 for recurrent nausea/vomiting and worsening abdominal that is has been improved with Zofran.  She notes that her abdominal  "pain and flank pain are worsening, with the development of new left-sided flank pain in addition to her previous right-sided flank/abdominal pain.  This morning she reports multiple bouts of vomiting around 9 AM but has otherwise not had further bouts of vomiting although she reports there is still lingering nausea.  The patient denies chest pain, shortness of breath, lightheadedness, or palpitations.  She denies ever having pain with urination, urinary frequency or urgency, or hematuria.  She states she sexually active with 1 partner and they do not use barrier contraception.  She denies vaginal discharge or dyspareunia.  She denies recent travel or antibiotic use besides that which was given during her recent visits to the emergency department.  She denies the use of IV drugs or alcohol.  She states that she vapes nicotine but otherwise does not use smoking tobacco.  She knowledges a history of anemia but does not take anything for it.  REVIEW OF SYSTEMS     Review of Systems   Constitutional:  Positive for chills and fever.   HENT:  Negative for ear pain, rhinorrhea and sinus pain.    Eyes:  Negative for photophobia and pain.   Respiratory:  Negative for chest tightness and shortness of breath.    Cardiovascular:  Negative for chest pain and palpitations.   Gastrointestinal:  Positive for abdominal pain, nausea and vomiting.   Endocrine: Negative for polyuria.   Genitourinary:  Negative for dysuria and hematuria.   Musculoskeletal:  Negative for arthralgias and myalgias.   Neurological:  Negative for dizziness and light-headedness.     OBJECTIVE     Vitals:    24 0257 24 0347 24 0419 24 0746   BP:  96/54  95/58   BP Location:       Pulse:  55  65   Resp:  18     Temp: 98 °F (36.7 °C) 97.7 °F (36.5 °C)  97.8 °F (36.6 °C)   TempSrc: Oral      SpO2:  98%  100%   Weight:   45.4 kg (100 lb)    Height:   5' 3\" (1.6 m)       Temperature:   Temp (24hrs), Av.7 °F (37.1 °C), Min:97.7 °F (36.5 " °C), Max:101.2 °F (38.4 °C)    Temperature: 97.8 °F (36.6 °C)  Intake & Output:  I/O       None          Weights:   IBW (Ideal Body Weight): 52.4 kg    Body mass index is 17.71 kg/m².  Weight (last 2 days)       Date/Time Weight    24 0419 45.4 (100)          Physical Exam  Constitutional:       General: She is not in acute distress.     Appearance: She is ill-appearing. She is not diaphoretic.   HENT:      Head: Normocephalic and atraumatic.      Mouth/Throat:      Mouth: Mucous membranes are dry.   Eyes:      General: No scleral icterus.        Right eye: No discharge.         Left eye: No discharge.   Cardiovascular:      Rate and Rhythm: Normal rate and regular rhythm.      Heart sounds: Normal heart sounds. No murmur heard.     No friction rub. No gallop.   Pulmonary:      Effort: Pulmonary effort is normal.      Breath sounds: Normal breath sounds. No wheezing, rhonchi or rales.   Abdominal:      General: Abdomen is flat. There is no distension.      Palpations: Abdomen is soft.      Tenderness: There is abdominal tenderness. There is no guarding.      Comments: Diffuse abdominal tenderness palpation that is most prominent in the right upper quadrant.   Musculoskeletal:      Right lower leg: No edema.      Left lower leg: No edema.      Comments: Skin overlying the lumbar and thoracic shows no changes, is nonerythematous or edematous.  Bilateral costovertebral angle tenderness.   Neurological:      Mental Status: She is alert.       PAST MEDICAL HISTORY     Past Medical History:   Diagnosis Date     2017    Anemia      PAST SURGICAL HISTORY   History reviewed. No pertinent surgical history.  SOCIAL & FAMILY HISTORY     Social History     Substance and Sexual Activity   Alcohol Use No       Social History     Substance and Sexual Activity   Drug Use Not Currently    Types: Marijuana    Comment: last use      Social History     Tobacco Use   Smoking Status Former    Current packs/day: 0.00     Types: Cigarettes    Quit date:     Years since quittin.6   Smokeless Tobacco Never     History reviewed. No pertinent family history.  LABORATORY DATA     Labs: I have personally reviewed pertinent reports.    Results from last 7 days   Lab Units 24  023   WBC Thousand/uL 8.06  --  9.88 13.06*   HEMOGLOBIN g/dL 8.8*  --  9.2* 11.2*   HEMATOCRIT % 28.7*  --  29.3* 34.7*   PLATELETS Thousands/uL 232 210 244 305   SEGS PCT % 55  --  71 84*   MONO PCT % 15*  --  15* 10   EOS PCT % 1  --  0 0      Results from last 7 days   Lab Units 24  1319   POTASSIUM mmol/L 3.5 3.4* 3.3* 3.6   CHLORIDE mmol/L 106 106 102 101   CO2 mmol/L 23 20* 19* 22   BUN mg/dL 6 7 10 10   CREATININE mg/dL 0.65 0.68 0.87 0.78   CALCIUM mg/dL 8.2* 8.3* 9.5 9.8   ALK PHOS U/L  --  39 50 50   ALT U/L  --  5* 7 7   AST U/L  --  11* 13 13              Results from last 7 days   Lab Units 24  1321   INR  1.01   PTT seconds 29             Micro:  Lab Results   Component Value Date    URINECX 80,000-89,000 cfu/ml Escherichia coli (A) 2024    URINECX >100,000 cfu/ml Escherichia coli (A) 2023    URINECX No Growth <1000 cfu/mL 2018     IMAGING & DIAGNOSTIC TESTS     Imaging: I have personally reviewed pertinent reports.    CT abdomen pelvis w contrast    Result Date: 2024  Impression: 1.  Mild heterogeneous bilateral renal parenchymal enhancement with minimal perinephric stranding suspicious for acute pyelonephritis. 2.  Complex hypodense area with ill-defined margins in the right renal upper pole suspicious for a developing abscess in this setting. Follow-up is recommended. 3.  Circumferential wall thickening of the urinary bladder consistent with cystitis. 4.  Diffuse bilateral ureteral wall thickening and enhancement likely on the basis of infectious ureteritis. The study was marked in EPIC for immediate  notification. Workstation performed: [id number]     EKG, Pathology, and Other Studies: I have personally reviewed pertinent reports.     ALLERGIES   No Known Allergies  MEDICATIONS PRIOR TO ARRIVAL     Prior to Admission medications    Medication Sig Start Date End Date Taking? Authorizing Provider   cefdinir (OMNICEF) 300 mg capsule Take 1 capsule (300 mg total) by mouth every 12 (twelve) hours for 10 days 9/3/24 9/13/24  Ranjit Fournier Jr., DO   ibuprofen (MOTRIN) 800 mg tablet Take 1 tablet (800 mg total) by mouth 3 (three) times a day  Patient not taking: Reported on 11/13/2019 5/30/19   Jewel Abraham PA-C   ondansetron (ZOFRAN) 4 mg tablet Take 1 tablet (4 mg total) by mouth every 6 (six) hours 9/4/24   Eloy Santos MD     MEDICATIONS ADMINISTERED IN LAST 24 HOURS     Medication Administration - last 24 hours from 09/04/2024 0749 to 09/05/2024 0749         Date/Time Order Dose Route Action Action by     09/04/2024 2316 EDT ondansetron (ZOFRAN) injection 4 mg 4 mg Intravenous Given Sophia Hoyos RN     09/04/2024 2316 EDT ketorolac (TORADOL) injection 15 mg 15 mg Intravenous Given Sophia Hoyos RN     09/05/2024 0016 EDT lactated ringers bolus 1,000 mL 0 mL Intravenous Stopped Sophia Hoyos RN     09/04/2024 2316 EDT lactated ringers bolus 1,000 mL 1,000 mL Intravenous New Bag Sophia Hoyos RN     09/04/2024 2354 EDT iohexol (OMNIPAQUE) 350 MG/ML injection (MULTI-DOSE) 100 mL 100 mL Intravenous Given Donis Donis Albert     09/05/2024 0159 EDT ceftriaxone (ROCEPHIN) 1 g/50 mL in dextrose IVPB 0 mg Intravenous Stopped Sophia Hoyos RN     09/05/2024 0129 EDT ceftriaxone (ROCEPHIN) 1 g/50 mL in dextrose IVPB 1,000 mg Intravenous New Bag Sophia Hoyos RN     09/05/2024 0248 EDT multi-electrolyte (PLASMALYTE-A/ISOLYTE-S PH 7.4) IV solution 100 mL/hr Intravenous New Bag Sophia Hoyos, TAMARA     09/05/2024 0417 EDT potassium chloride (Klor-Con M20) CR tablet 20 mEq 20 mEq Oral Given Emilie  "TAMARA Diaz          CURRENT MEDICATIONS     Current Facility-Administered Medications   Medication Dose Route Frequency Provider Last Rate    acetaminophen  650 mg Oral Q6H PRN Chidi Hess MD      cefTRIAXone  2,000 mg Intravenous Q24H Carmela Hess DO      enoxaparin  40 mg Subcutaneous Daily Chidi Hess MD      ketorolac  15 mg Intravenous Q6H PRN Sonny Bueno MD      multi-electrolyte  100 mL/hr Intravenous Continuous Sonny Bueno  mL/hr (09/05/24 0248)    nicotine  14 mg Transdermal Daily Chidi Hess MD      ondansetron  4 mg Oral Q6H PRN Chidi Hess MD       multi-electrolyte, 100 mL/hr, Last Rate: 100 mL/hr (09/05/24 0248)      acetaminophen, 650 mg, Q6H PRN  ketorolac, 15 mg, Q6H PRN  ondansetron, 4 mg, Q6H PRN        Admission Time  I spent 30 minutes admitting the patient.  This involved direct patient contact where I performed a full history and physical, reviewing previous records, and reviewing laboratory and other diagnostic studies.    Portions of the record may have been created with voice recognition software.  Occasional wrong word or \"sound a like\" substitutions may have occurred due to the inherent limitations of voice recognition software.  Read the chart carefully and recognize, using context, where substitutions have occurred.    ==  Chidi Hess MD  [facility]  Internal Medicine Residency PGY-1   "

## 2024-09-05 NOTE — CASE MANAGEMENT
Case Management Assessment & Discharge Planning Note    Patient name Vannessa Birmingham  Location /-01 MRN [id number]  : 2000 Date 2024       Current Admission Date: 2024  Current Admission Diagnosis:Pyelonephritis   Patient Active Problem List    Diagnosis Date Noted Date Diagnosed    Pyelonephritis 2024     Renal abscess 2024     Anemia 2024     History of nicotine vaping 2024     Hypokalemia 2024     GERD (gastroesophageal reflux disease) 2024     40 weeks gestation of pregnancy 10/29/2018     Normal spontaneous vaginal delivery 10/29/2018     High risk teen pregnancy 2018     Maternal drug use complicating pregnancy in second trimester, antepartum 2018     Limited prenatal care 2018       LOS (days): 0  Geometric Mean LOS (GMLOS) (days): 2.9  Days to GMLOS:2.4     OBJECTIVE:    Risk of Unplanned Readmission Score: 12.62         Current admission status: Inpatient       Preferred Pharmacy:   [id number] - [place], [place] - [address]  [address] [address]  Phone: [phone] Fax: [phone]    Primary Care Provider: No primary care provider on file.    Primary Insurance: CAPITAL  Secondary Insurance:     ASSESSMENT:  Active Health Care Proxies    There are no active Health Care Proxies on file.        Patient Information  Admitted from:: Home  Mental Status: Alert  During Assessment patient was accompanied by: Not accompanied during assessment  Assessment information provided by:: Patient  Support Systems: Self, Spouse/significant other, Family members  County of Residence: [place]  What city do you live in?: [place]  Home entry access options. Select all that apply.: Stairs  Number of steps to enter home.: 4  Type of Current Residence: Apartment  Floor Level: 1  Upon entering residence, is there a bedroom on the main floor (no further steps)?: No  A bedroom is located on the  following floor levels of residence (select all that apply):: 2nd Floor  Upon entering residence, is there a bathroom on the main floor (no further steps)?: No  Indicate which floors of current residence have a bathroom (select all the apply):: 2nd Floor  Number of steps to 2nd floor from main floor: One Flight  Living Arrangements: Lives w/ Son, Lives w/ Spouse/significant other  Is patient a ?: No    Activities of Daily Living Prior to Admission  Functional Status: Independent  Completes ADLs independently?: Yes  Ambulates independently?: Yes  Does patient use assisted devices?: No  Does the patient have a history of Short-Term Rehab?: No  Does patient have a history of HHC?: No  Does patient currently have HHC?: No     Patient Information Continued  Income Source: Employed (FT)  Does patient have prescription coverage?: Yes  Does patient receive dialysis treatments?: No  Does patient have a history of substance abuse?: No  Does patient have a history of Mental Health Diagnosis?: No (patient reports no MH history)    Means of Transportation  Means of Transport to \[facility] [place] [facility]\"":: Drives Self      Social Determinants of Health (SDOH)      Flowsheet Row Most Recent Value   Housing Stability    In the last 12 months, was there a time when you were not able to pay the mortgage or rent on time? N   In the past 12 months, how many times have you moved where you were living? 0   At any time in the past 12 months, were you homeless or living in a shelter (including now)? N   Transportation Needs    In the past 12 months, has lack of transportation kept you from medical appointments or from getting medications? no   In the past 12 months, has lack of transportation kept you from meetings, work, or from getting things needed for daily living? No   Food Insecurity    Within the past 12 months, you worried that your food would run out before you got the money to buy more. Never true   Within the past 12 months, the food you  bought just didn't last and you didn't have money to get more. Never true   Utilities    In the past 12 months has the electric, gas, oil, or water company threatened to shut off services in your home? No            DISCHARGE DETAILS:    Discharge planning discussed with:: Pt bedside   CM contacted family/caregiver?:  (a&ox4)     Requested Home Health Care         Is the patient interested in HHC at discharge?: No    DME Referral Provided  Referral made for DME?: No     Transport at Discharge : Family, Ride Share     Additional Comments: CM met with pt bedside to introduce self and CM role. Pt reports residing with boyfriend and 5 yr old son in 2-level apartment, 4 [address]OMY and bed&bathroom access on 2nd floor. Pt reports no concerns with SDOH. Pt reports no hx of SA treatment or MH diagnoses. Pt works full time and drives. Emergency contacts and address information reviewed and confirmed with patient. All questions and concerns addressed at this time, CM to follow and assist with DC planning as needed

## 2024-09-05 NOTE — PHYSICAL THERAPY NOTE
Physical Therapy Screen    Patient Name: Vannessa Birmingham    Today's Date: 2024     Problem List  Principal Problem:    Pyelonephritis  Active Problems:    Renal abscess    Anemia    History of nicotine vaping    Hypokalemia    GERD (gastroesophageal reflux disease)       Past Medical History  Past Medical History:   Diagnosis Date     2017    Anemia         24 0837   PT Last Visit   PT Visit Date 24   Note Type   Note type Screen       PT orders received and chart reviewed. Made contact w/ Pt this AM and Pt reports being completely independent at this time. Pt states no acute PT concerns, no concerns for return home. D/c Pt from PT caseload. Please re-consult if new needs arise.       Randall Yeboah PT, DPT

## 2024-09-05 NOTE — ED PROVIDER NOTES
History  Chief Complaint   Patient presents with    Abdominal Pain     Pt c/o generalized abdominal pain and generalized body aches x3 days. Reports fevers, N/V. Denies urinary symptoms      HPI    Patient is a 24-year-old female with a pertinent past medical history of pyelonephritis on antibiotics who presents here today for nausea, vomiting, body aches, and general feeling of unwellness.  Patient says that she has been vomiting multiple times and dry heaving.  The Zofran that she was given is not providing her any help.  She has been taking her antibiotics for the pyelonephritis but she is saying her symptoms are still worsening.  Patient says that originally her symptoms were right-sided flank and abdominal pain but now she is having left-sided flank pain as well in addition to new left-sided rib pain and pain under her clavicle, and pain in her neck.  She has not been able to keep anything down and just wants some relief    Prior to Admission Medications   Prescriptions Last Dose Informant Patient Reported? Taking?   cefdinir (OMNICEF) 300 mg capsule   No No   Sig: Take 1 capsule (300 mg total) by mouth every 12 (twelve) hours for 10 days   ibuprofen (MOTRIN) 800 mg tablet   No No   Sig: Take 1 tablet (800 mg total) by mouth 3 (three) times a day   Patient not taking: Reported on 2019   ondansetron (ZOFRAN) 4 mg tablet   No No   Sig: Take 1 tablet (4 mg total) by mouth every 6 (six) hours      Facility-Administered Medications: None       Past Medical History:   Diagnosis Date     2017    Anemia        History reviewed. No pertinent surgical history.    History reviewed. No pertinent family history.  I have reviewed and agree with the history as documented.    E-Cigarette/Vaping    E-Cigarette Use Current Some Day User      E-Cigarette/Vaping Substances    Nicotine Yes      Social History     Tobacco Use    Smoking status: Former     Current packs/day: 0.00     Types: Cigarettes     Quit date:       Years since quittin.7    Smokeless tobacco: Never   Vaping Use    Vaping status: Some Days    Substances: Nicotine   Substance Use Topics    Alcohol use: No    Drug use: Not Currently     Types: Marijuana     Comment: last use         Review of Systems   Constitutional:  Positive for appetite change and fever. Negative for chills.   HENT:  Negative for sore throat.    Eyes:  Negative for pain and visual disturbance.   Respiratory:  Negative for cough and shortness of breath.    Cardiovascular:  Negative for chest pain and palpitations.   Gastrointestinal:  Positive for nausea and vomiting. Negative for abdominal pain, constipation and diarrhea.   Genitourinary:  Negative for dysuria and hematuria.   Musculoskeletal:  Negative for arthralgias and back pain.   Skin:  Negative for color change and rash.   Neurological:  Positive for headaches.   All other systems reviewed and are negative.      Physical Exam  ED Triage Vitals [24]   Temperature Pulse Respirations Blood Pressure SpO2   (!) 101.2 °F (38.4 °C) 94 20 109/85 96 %      Temp Source Heart Rate Source Patient Position - Orthostatic VS BP Location FiO2 (%)   Temporal Monitor Sitting Left arm --      Pain Score       10 - Worst Possible Pain             Orthostatic Vital Signs  Vitals:    24 2232 24 0756 24 1520 24 2301   BP: 107/63 98/53 98/62 99/61   Pulse: 63 (!) 53     Patient Position - Orthostatic VS:           Physical Exam  Vitals and nursing note reviewed.   Constitutional:       General: She is not in acute distress.     Appearance: She is well-developed. She is ill-appearing.   HENT:      Head: Normocephalic and atraumatic.   Eyes:      Conjunctiva/sclera: Conjunctivae normal.   Cardiovascular:      Rate and Rhythm: Normal rate and regular rhythm.      Heart sounds: No murmur heard.  Pulmonary:      Effort: Pulmonary effort is normal. No respiratory distress.      Breath sounds: Normal breath sounds.    Abdominal:      Palpations: Abdomen is soft.      Tenderness: There is abdominal tenderness (RUE, RLE, LLE). There is right CVA tenderness and left CVA tenderness.   Musculoskeletal:         General: No swelling.      Cervical back: Neck supple.   Skin:     General: Skin is warm and dry.      Capillary Refill: Capillary refill takes less than 2 seconds.   Neurological:      Mental Status: She is alert.   Psychiatric:         Mood and Affect: Mood normal.         ED Medications  Medications   multi-electrolyte (PLASMALYTE-A/ISOLYTE-S PH 7.4) IV solution (0 mL/hr Intravenous Stopped 9/5/24 1245)   multi-electrolyte (PLASMALYTE-A/ISOLYTE-S PH 7.4) IV solution (0 mL/hr Intravenous Stopped 9/7/24 0911)   ondansetron (ZOFRAN) injection 4 mg (4 mg Intravenous Given 9/4/24 2316)   ketorolac (TORADOL) injection 15 mg (15 mg Intravenous Given 9/4/24 2316)   lactated ringers bolus 1,000 mL (0 mL Intravenous Stopped 9/5/24 0016)   iohexol (OMNIPAQUE) 350 MG/ML injection (MULTI-DOSE) 100 mL (100 mL Intravenous Given 9/4/24 2354)   ceftriaxone (ROCEPHIN) 1 g/50 mL in dextrose IVPB (0 mg Intravenous Stopped 9/5/24 0159)   potassium chloride (Klor-Con M20) CR tablet 20 mEq (20 mEq Oral Given 9/5/24 0417)   diphenhydrAMINE (BENADRYL) tablet 25 mg (25 mg Oral Given 9/5/24 2043)   hydrOXYzine HCL (ATARAX) tablet 25 mg (25 mg Oral Given 9/6/24 0053)       Diagnostic Studies  Results Reviewed       Procedure Component Value Units Date/Time    Blood culture [[id number]] Collected: 09/05/24 0239    Lab Status: Final result Specimen: Blood from Arm, Right Updated: 09/10/24 0701     Blood Culture No Growth After 5 Days.    Blood culture [[id number]] Collected: 09/05/24 0240    Lab Status: Final result Specimen: Blood from Arm, Left Updated: 09/10/24 0701     Blood Culture No Growth After 5 Days.    Basic metabolic panel [[id number]]  (Abnormal) Collected: 09/05/24 0448    Lab Status: Final result Specimen: Blood from Arm, Left Updated:  09/05/24 0646     Sodium 137 mmol/L      Potassium 3.5 mmol/L      Chloride 106 mmol/L      CO2 23 mmol/L      ANION GAP 8 mmol/L      BUN 6 mg/dL      Creatinine 0.65 mg/dL      Glucose 87 mg/dL      Calcium 8.2 mg/dL      eGFR 124 ml/min/1.73sq m     Narrative:      National Kidney Disease Foundation guidelines for Chronic Kidney Disease (CKD):     Stage 1 with normal or high GFR (GFR > 90 mL/min/1.73 square meters)    Stage 2 Mild CKD (GFR = 60-89 mL/min/1.73 square meters)    Stage 3A Moderate CKD (GFR = 45-59 mL/min/1.73 square meters)    Stage 3B Moderate CKD (GFR = 30-44 mL/min/1.73 square meters)    Stage 4 Severe CKD (GFR = 15-29 mL/min/1.73 square meters)    Stage 5 End Stage CKD (GFR <15 mL/min/1.73 square meters)  Note: GFR calculation is accurate only with a steady state creatinine    CBC and differential [[id number]]  (Abnormal) Collected: 09/05/24 0448    Lab Status: Final result Specimen: Blood from Arm, Left Updated: 09/05/24 0623     WBC 8.06 Thousand/uL      RBC 3.51 Million/uL      Hemoglobin 8.8 g/dL      Hematocrit 28.7 %      MCV 82 fL      MCH 25.1 pg      MCHC 30.7 g/dL      RDW 18.0 %      MPV 11.7 fL      Platelets 232 Thousands/uL      nRBC 0 /100 WBCs      Segmented % 55 %      Immature Grans % 0 %      Lymphocytes % 29 %      Monocytes % 15 %      Eosinophils Relative 1 %      Basophils Relative 0 %      Absolute Neutrophils 4.42 Thousands/µL      Absolute Immature Grans 0.03 Thousand/uL      Absolute Lymphocytes 2.36 Thousands/µL      Absolute Monocytes 1.18 Thousand/µL      Eosinophils Absolute 0.04 Thousand/µL      Basophils Absolute 0.03 Thousands/µL     Sedimentation rate, automated [[id number]]  (Normal) Collected: 09/05/24 0239    Lab Status: Final result Specimen: Blood from Arm, Right Updated: 09/05/24 0252     Sed Rate 19 mm/hour     Platelet count [[id number]]  (Normal) Collected: 09/05/24 0239    Lab Status: Final result Specimen: Blood from Arm, Right Updated: 09/05/24 0250      Platelets 210 Thousands/uL      MPV 10.6 fL     Comprehensive metabolic panel [[id number]]  (Abnormal) Collected: 09/04/24 2316    Lab Status: Final result Specimen: Blood from Arm, Right Updated: 09/04/24 2348     Sodium 135 mmol/L      Potassium 3.4 mmol/L      Chloride 106 mmol/L      CO2 20 mmol/L      ANION GAP 9 mmol/L      BUN 7 mg/dL      Creatinine 0.68 mg/dL      Glucose 104 mg/dL      Calcium 8.3 mg/dL      AST 11 U/L      ALT 5 U/L      Alkaline Phosphatase 39 U/L      Total Protein 6.1 g/dL      Albumin 3.6 g/dL      Total Bilirubin 0.28 mg/dL      eGFR 122 ml/min/1.73sq m     Narrative:      National Kidney Disease Foundation guidelines for Chronic Kidney Disease (CKD):     Stage 1 with normal or high GFR (GFR > 90 mL/min/1.73 square meters)    Stage 2 Mild CKD (GFR = 60-89 mL/min/1.73 square meters)    Stage 3A Moderate CKD (GFR = 45-59 mL/min/1.73 square meters)    Stage 3B Moderate CKD (GFR = 30-44 mL/min/1.73 square meters)    Stage 4 Severe CKD (GFR = 15-29 mL/min/1.73 square meters)    Stage 5 End Stage CKD (GFR <15 mL/min/1.73 square meters)  Note: GFR calculation is accurate only with a steady state creatinine    CBC and differential [[id number]]  (Abnormal) Collected: 09/04/24 2316    Lab Status: Final result Specimen: Blood from Arm, Right Updated: 09/04/24 2329     WBC 9.88 Thousand/uL      RBC 3.76 Million/uL      Hemoglobin 9.2 g/dL      Hematocrit 29.3 %      MCV 78 fL      MCH 24.5 pg      MCHC 31.4 g/dL      RDW 17.5 %      MPV 10.2 fL      Platelets 244 Thousands/uL      nRBC 0 /100 WBCs      Segmented % 71 %      Immature Grans % 1 %      Lymphocytes % 13 %      Monocytes % 15 %      Eosinophils Relative 0 %      Basophils Relative 0 %      Absolute Neutrophils 7.02 Thousands/µL      Absolute Immature Grans 0.07 Thousand/uL      Absolute Lymphocytes 1.28 Thousands/µL      Absolute Monocytes 1.48 Thousand/µL      Eosinophils Absolute 0.01 Thousand/µL      Basophils Absolute 0.02  Thousands/µL                    CT abdomen pelvis w contrast   Final Result by Adalgisa Love MD (09/05 0046)      1.  Mild heterogeneous bilateral renal parenchymal enhancement with minimal perinephric stranding suspicious for acute pyelonephritis.   2.  Complex hypodense area with ill-defined margins in the right renal upper pole suspicious for a developing abscess in this setting. Follow-up is recommended.   3.  Circumferential wall thickening of the urinary bladder consistent with cystitis.   4.  Diffuse bilateral ureteral wall thickening and enhancement likely on the basis of infectious ureteritis.      The study was marked in EPIC for immediate notification.            Workstation performed: [id number]               Procedures  Procedures      ED Course                             SBIRT 20yo+      Flowsheet Row Most Recent Value   Initial Alcohol Screen: US AUDIT-C     1. How often do you have a drink containing alcohol? 1 Filed at: 09/04/2024 2224   2. How many drinks containing alcohol do you have on a typical day you are drinking?  0 Filed at: 09/04/2024 2224   3a. Male UNDER 65: How often do you have five or more drinks on one occasion? 0 Filed at: 09/04/2024 2224   3b. FEMALE Any Age, or MALE 65+: How often do you have 4 or more drinks on one occassion? 0 Filed at: 09/04/2024 2224   Audit-C Score 1 Filed at: 09/04/2024 2224   KARISSA: How many times in the past year have you...    Used an illegal drug or used a prescription medication for non-medical reasons? Never Filed at: 09/04/2024 2224                  Medical Decision Making  Amount and/or Complexity of Data Reviewed  Labs: ordered.  Radiology: ordered.    Risk  Prescription drug management.  Decision regarding hospitalization.      Patient is a 24-year-old female who is febrile with a recent diagnosis of pyelonephritis who is presenting here today with nausea, vomiting and bodyaches.  Differentials for this patient include appendicitis, worsening  pyelonephritis, and an acute kidney injury.    For this patient I gave her some Zofran, fluids, and Toradol.    I ordered a CT with contrast, CBC, and a CMP.    She was found to have a drop in hemoglobin to 9.2, and CT showed bilateral pyelonephritis, and cystitis with a developing abscess in the R superior renal pole. She's been given ceftriaxone 1g.    She's been admitted to [facility].    Disposition  Final diagnoses:   Pyelonephritis   Cystitis     Time reflects when diagnosis was documented in both MDM as applicable and the Disposition within this note       Time User Action Codes Description Comment    9/5/2024  1:36 AM Toy Pruitt [N12] Pyelonephritis     9/5/2024  1:36 AM Toy Pruitt [N30.90] Cystitis     9/7/2024  8:58 AM Carmela Hess [R11.2] Nausea and vomiting     9/7/2024  9:01 AM Carmela Hess [K21.9] Gastroesophageal reflux disease, unspecified whether esophagitis present     9/7/2024  9:01 AM Carmela Hess [D64.9] Anemia     9/7/2024  9:06 AM Carmela Hess [Z97.5] Nexplanon in place           ED Disposition       ED Disposition   Admit    Condition   Stable    Date/Time   Thu Sep 5, 2024 9381    Comment                  Follow-up Information       Follow up With Specialties Details Why Contact Info Additional Information    Jamaal Chen MD Internal Medicine Schedule an appointment as soon as possible for a visit  [address] [place] [address] [address]  [facility] [address] [phone]       [facility] Obstetrics and Gynecology Schedule an appointment as soon as possible for a visit  [address] [place] [address] [phone] [facility], [address], [address] [phone]    [facility] Internal Medicine Schedule an appointment as soon as possible for a visit  [address] [place] [id number]  [phone] [facility],  [address], [address]   [phone]            Discharge Medication List as of 9/7/2024  9:16 AM        START taking these medications    Details   famotidine (PEPCID) 20 mg tablet Take 1 tablet (20 mg total) by mouth daily before breakfast, Starting Sun 9/8/2024, Until Tue 10/8/2024, Normal      ferrous sulfate 325 (65 Fe) mg tablet Take 1 tablet (325 mg total) by mouth every other day, Starting Sun 9/8/2024, Normal      levofloxacin (LEVAQUIN) 500 mg tablet Take 1 tablet (500 mg total) by mouth every 24 hours for 12 days, Starting Sat 9/7/2024, Until Thu 9/19/2024, Normal      oxyCODONE (ROXICODONE) 5 immediate release tablet Take 1 tablet (5 mg total) by mouth every 6 (six) hours as needed for severe pain for up to 10 doses Max Daily Amount: 20 mg, Starting Sat 9/7/2024, Normal           CONTINUE these medications which have CHANGED    Details   ondansetron (ZOFRAN) 4 mg tablet Take 1 tablet (4 mg total) by mouth every 6 (six) hours for 3 days, Starting Sat 9/7/2024, Until Tue 9/10/2024, Normal           STOP taking these medications       cefdinir (OMNICEF) 300 mg capsule Comments:   Reason for Stopping:         ibuprofen (MOTRIN) 800 mg tablet Comments:   Reason for Stopping:                 PDMP Review       None             ED Provider  Attending physically available and evaluated Vannessa Birmingham. I managed the patient along with the ED Attending.    Electronically Signed by           Toy Pruitt MD  09/05/24 4152       Toy Pruitt MD  09/13/24 8673

## 2024-09-05 NOTE — ED ATTENDING ATTESTATION
9/4/2024  I, Alberto Mckeon DO, saw and evaluated the patient. I have discussed the patient with the resident/non-physician practitioner and agree with the resident's/non-physician practitioner's findings, Plan of Care, and MDM as documented in the resident's/non-physician practitioner's note, except where noted. All available labs and Radiology studies were reviewed.  I was present for key portions of any procedure(s) performed by the resident/non-physician practitioner and I was immediately available to provide assistance.       At this point I agree with the current assessment done in the Emergency Department.  I have conducted an independent evaluation of this patient a history and physical is as follows:    Patient is a 24-year-old female history of anemia, coming by her boyfriend.  Yesterday early in the morning while at work she had the onset of some mild right lower abdominal pain as well as some mild anorexia, some mild nausea.  She went to an outside emergency department, CT without contrast showed no acute pathology with the exception of some bladder wall thickening, laboratory studies were remarkable for a leukocytosis of 13,000, CMP unremarkable, pregnancy was negative, urinalysis suggested infection, patient was felt to possibly have pyelonephritis, given 1 g ceftriaxone IV and discharged with prescription for cefdinir.  She says that her pain improved a little bit, then started having several episode of nonbloody, nonbilious emesis and she became feeling more achy and sore all over.  She went to an different outside emergency department about 2 AM this morning,, per review of records, she had a negative urine pregnancy test, unremarkable CMP, CBC showed a continued leukocytosis of 13,000, negative pregnancy, negative COVID, flu, RSV.  She was given a prescription for Zofran.  She says since leaving she is still having some nausea, 1 or 2 episodes of nonbloody, nonbilious emesis but has been able to  take her antibiotics.  She says there is no travel history, no prior abdominal surgeries, no one else sick around her except one of her coworkers reportedly had COVID 1 week ago.  She said she has never had any dysuria or hematuria with this symptoms.  She has no vaginal bleeding or vaginal discharge.  No recent antibiotics, no travel history.  Her boyfriend who accompanies her indicates that she is otherwise been acting well.    General:  Patient is well-appearing  Head:  Atraumatic  Eyes:  Conjunctiva pink  ENT:  Mucous membranes are moist  Neck:  Supple  Cardiac:  S1-S2, without murmurs  Lungs:  Clear to auscultation bilaterally  Abdomen: Patient has some mild left upper, right upper and right lower abdominal tenderness, greatest in the right lower quadrant.  No tympany, no rigidity, no guarding, does have bilateral CVA tenderness.  Extremities:  Normal range of motion  Neurologic:  Awake, fluent speech, normal comprehension, AAOx3  Skin:  Pink warm and dry  Psychiatric:  Alert, pleasant, cooperative      ED Course  ED Course as of 09/05/24 0139   Wed Sep 04, 2024   2353 Chronic microcytic anemia anemia     Labs Reviewed   CBC AND DIFFERENTIAL - Abnormal       Result Value Ref Range Status    WBC 9.88  4.31 - 10.16 Thousand/uL Final    RBC 3.76 (*) 3.81 - 5.12 Million/uL Final    Hemoglobin 9.2 (*) 11.5 - 15.4 g/dL Final    Hematocrit 29.3 (*) 34.8 - 46.1 % Final    MCV 78 (*) 82 - 98 fL Final    MCH 24.5 (*) 26.8 - 34.3 pg Final    MCHC 31.4  31.4 - 37.4 g/dL Final    RDW 17.5 (*) 11.6 - 15.1 % Final    MPV 10.2  8.9 - 12.7 fL Final    Platelets 244  149 - 390 Thousands/uL Final    nRBC 0  /100 WBCs Final    Segmented % 71  43 - 75 % Final    Immature Grans % 1  0 - 2 % Final    Lymphocytes % 13 (*) 14 - 44 % Final    Monocytes % 15 (*) 4 - 12 % Final    Eosinophils Relative 0  0 - 6 % Final    Basophils Relative 0  0 - 1 % Final    Absolute Neutrophils 7.02  1.85 - 7.62 Thousands/µL Final    Absolute Immature  Grans 0.07  0.00 - 0.20 Thousand/uL Final    Absolute Lymphocytes 1.28  0.60 - 4.47 Thousands/µL Final    Absolute Monocytes 1.48 (*) 0.17 - 1.22 Thousand/µL Final    Eosinophils Absolute 0.01  0.00 - 0.61 Thousand/µL Final    Basophils Absolute 0.02  0.00 - 0.10 Thousands/µL Final   COMPREHENSIVE METABOLIC PANEL - Abnormal    Sodium 135  135 - 147 mmol/L Final    Potassium 3.4 (*) 3.5 - 5.3 mmol/L Final    Chloride 106  96 - 108 mmol/L Final    CO2 20 (*) 21 - 32 mmol/L Final    ANION GAP 9  4 - 13 mmol/L Final    BUN 7  5 - 25 mg/dL Final    Creatinine 0.68  0.60 - 1.30 mg/dL Final    Comment: Standardized to IDMS reference method    Glucose 104  65 - 140 mg/dL Final    Comment: If the patient is fasting, the ADA then defines impaired fasting glucose as > 100 mg/dL and diabetes as > or equal to 123 mg/dL.    Calcium 8.3 (*) 8.4 - 10.2 mg/dL Final    AST 11 (*) 13 - 39 U/L Final    ALT 5 (*) 7 - 52 U/L Final    Comment: Specimen collection should occur prior to Sulfasalazine administration due to the potential for falsely depressed results.     Alkaline Phosphatase 39  34 - 104 U/L Final    Total Protein 6.1 (*) 6.4 - 8.4 g/dL Final    Albumin 3.6  3.5 - 5.0 g/dL Final    Total Bilirubin 0.28  0.20 - 1.00 mg/dL Final    Comment: Use of this assay is not recommended for patients undergoing treatment with eltrombopag due to the potential for falsely elevated results.  N-acetyl-p-benzoquinone imine (metabolite of Acetaminophen) will generate erroneously low results in samples for patients that have taken an overdose of Acetaminophen.    eGFR 122  ml/min/1.73sq m Final    Narrative:     National Kidney Disease Foundation guidelines for Chronic Kidney Disease (CKD):     Stage 1 with normal or high GFR (GFR > 90 mL/min/1.73 square meters)    Stage 2 Mild CKD (GFR = 60-89 mL/min/1.73 square meters)    Stage 3A Moderate CKD (GFR = 45-59 mL/min/1.73 square meters)    Stage 3B Moderate CKD (GFR = 30-44 mL/min/1.73 square  meters)    Stage 4 Severe CKD (GFR = 15-29 mL/min/1.73 square meters)    Stage 5 End Stage CKD (GFR <15 mL/min/1.73 square meters)  Note: GFR calculation is accurate only with a steady state creatinine     CT abdomen pelvis w contrast   Final Result      1.  Mild heterogeneous bilateral renal parenchymal enhancement with minimal perinephric stranding suspicious for acute pyelonephritis.   2.  Complex hypodense area with ill-defined margins in the right renal upper pole suspicious for a developing abscess in this setting. Follow-up is recommended.   3.  Circumferential wall thickening of the urinary bladder consistent with cystitis.   4.  Diffuse bilateral ureteral wall thickening and enhancement likely on the basis of infectious ureteritis.      The study was marked in EPIC for immediate notification.            Workstation performed: [id number]             Patient presentation was initially concerning for acute appendicitis.  Pyelonephritis also considered given her febrile nature and her CVA tenderness.  I reviewed urine culture results from September 3 which show 80-90,000 CFU E. coli, no sensitivity available yet.  Patient has no sign of peritonitis.     CT interpreted by radiology, and independently interpreted by me as well shows a normal appendix but does show findings concerning for pyelonephritis with perinephric stranding.  There is a possible early developing abscess in the right upper pole of the kidney, no clear abscess which would require drainage or immediate surgery.    Case discussed with admitting medicine physician    DIAGNOSIS:  Acute pyelonephritis failed outpatient management    MEDICAL DECISION MAKING CODING    COLLECTION AND INTERPRETATION OF DATA  I reviewed prior external notes, including laboratory studies and imaging as noted above,    I ordered each unique test  Tests reviewed personally by me:  Labs: See above  Imaging: I independently interpreted the CT as noted  above.            Critical Care Time  Procedures

## 2024-09-05 NOTE — ASSESSMENT & PLAN NOTE
Patient noted to have microcytic anemia on most recent blood work obtained during admission, with hemoglobin 9.2 and MCV 78.  On chart review, it appears that patient is chronically anemic with baseline hemoglobin appearing to range from 9-11 since 2018.  Recent drop from 11.2 to 8.8 with no obvious signs of bleeding and likely secondary to recent volume supplementation which over the course last 2 days totals over 2.5 L. Utilizing Ganzoni equation for anemia gives a total iron deficit of 848mg. Absolute reticulocyte is 0.8% based on reticulocyte count of 0.92%. Consider etiology secondary to iron deficiency, pernicious anemia.     Will hold IV iron supplementation now given poor reaction to venofer on 9/5, but oral supplementation ordered. Continue to workup outpatient.     Plan  Supplement iron 325mg PO every other day  Consider pernicious anemia Abs outpatient

## 2024-09-05 NOTE — PROGRESS NOTES
INTERNAL MEDICINE RESIDENCY PROGRESS NOTE     Name: Vannessa Birmingham   Age & Sex: 24 y.o. female   MRN: [id number]  Unit/Bed#: -01   Encounter: [id number]  Team: SOD Team A    PATIENT INFORMATION     Name: Vannessa Birmingham   Age & Sex: 24 y.o. female   MRN: [id number]  Hospital Stay Days: 0    ASSESSMENT/PLAN     Principal Problem:    Pyelonephritis  Active Problems:    Renal abscess    Anemia    GERD (gastroesophageal reflux disease)    History of nicotine vaping    Hypokalemia      * Pyelonephritis  Assessment & Plan  Patient originally presented to outside emergency department approximately 2 days ago with complaints of back pain, flank pain, and fatigue, predominantly on the right side.  At least at that time she denied any systemic symptoms or urinary complaints.  Studies from that time notable for mild leukocytosis of 13.6 with UA showing 2+ leukocytes, positive nitrites, 2+ occult blood and microscopy showing 4-10 RBC, innumerable WBC and moderate bacteria.  CT imaging from that admission showed mild nonspecific circumferential bladder wall thickening suggestive of cystitis and patient was ultimately discharged home on a 10-day course of cefdinir.  It should be noted that urine culture obtained during this time has preliminary growth of 80-89K E. coli with susceptibilities pending as of time of writing.  Patient returned to a different emergency department early in the morning of the following day (9/4) with ongoing symptoms, particularly nausea.  During this evaluation, she was given Zofran and IV fluids with some improvement in her symptoms and she was encouraged to follow-up with her PCP for ongoing care.  She returned to our emergency department later in the day with further symptom recurrence as well as spreading of her symptoms to the left flank.  Interestingly, her CBC shows interval improvement of her leukocytosis to 9.88 with decreased percentage of neutrophils; however, patient  again noted to be febrile and repeat imaging now noted mild bilateral renal parenchymal enhancement with some adjacent perinephric stranding.  Also noted was a complex hypodense area with ill-defined margins in the right upper renal pole concerning for developing abscess also noted were ongoing bladder wall thickening and diffuse bilateral ureteral wall thickening and enhancement.    Patient was given 3 doses of ceftriaxone prior to admission.  She was also given Toradol, Zofran, and 1 L fluid bolus.    Plan  Given concern for renal abscess, will continue ceftriaxone which previous cultures have been sensitive to  Blood culture sensitivities pending, will guide further antibiotic treatment  Patient currently on as needed Tylenol 975 mg every 6 abdominal pain and oxycodone 2.5mg/5mg every 6 as needed for moderate/severe pain, dilaudid for breakthrough pain  100 mL Isolyte maintenance fluids as she is having difficulty with oral intake, advance diet as tolerated  Zofran every 6 hours as needed for nausea/vomiting  Blood cultures ordered, however patient has received multiple days of oral and IV antibiotic treatment prior to draw which may affect reliability of results  PT/OT, case management consult in place        Renal abscess  Assessment & Plan  Patient with 2-day history of back and right flank pain, anorexia, fatigue, that has developed fever/chills and worsening abdominal pain now on the left flank with imaging on 9/3 that was indicative of mild bladder wall thickening and associated cystitis.  Repeat CT abdomen/pelvis on 9/4 showed evidence of mild heterogeneous bilateral renal parenchymal enhancement with minimal perinephric stranding suspicious for acute pyelonephritis, complex hypodense area measuring 2.2 x 1.0 x 1.4 cm with ill-defined margins in the right renal upper pole suspicious for a developing abscess in this setting. Follow-up is recommended, circumferential wall thickening of the urinary bladder  "consistent with cystitis, and diffuse bilateral ureteral wall thickening and enhancement likely on the basis of infectious ureteritis.  Imaging suggestive of advancing disease and newly developing abscess.    Plan:  Ceftriaxone 2g with further antibiotic regimen to be guided by urine culture sensitivities  At this time we will not consult IR for drainage given the size of developing abscess, can consider consultation for drainage in the future if necessary    Anemia  Assessment & Plan  Patient noted to have microcytic anemia on most recent blood work obtained during admission, with hemoglobin 9.2 and MCV 78.  On chart review, it appears that patient is chronically anemic with baseline hemoglobin appearing to range from 9-11 since 2018.  Recent drop from 11.2 to 8.8 with no obvious signs of bleeding and likely secondary to recent volume supplementation which over the course last 2 days totals over 2.5 L. Utilizing Ganzoni equation for anemia gives a total iron deficit of 848mg. Absolute reticulocyte is 0.8% based on reticulocyte count of 0.92%. Consider etiology secondary to iron deficiency, pernicious anemia. Will hold iron supplementation now given infection but workup outpatient.     Plan  Monitor CBC  Supplement iron with infusions outpatient  Consider pernicious anemia Abs outpatient    GERD (gastroesophageal reflux disease)  Assessment & Plan  Patient reports several weeks of episodes of \"acid coming up into throat\". She states this effects her appetite. She noted this happening this AM. Consider testing for h.pylori in future, so prefer H2 blocker over protonix at this time.    Plan:  Pepcid 20mg daily, can increase to 40mg if needed.     Hypokalemia  Assessment & Plan  Mild hypokalemia at 3.4 likely as result of recent anorexia and nausea/vomiting.  Patient provided with 20 mEq KCl.  Currently on 100 mL of Isolyte maintenance fluid.  Continue trending on morning labs.    Resolved 9/5 AM    History of nicotine " "vaping  Assessment & Plan  Patient reports vaping nicotine every day.  Reports desire for nicotine replacement while in the hospital.  Have started patient grams nicotine replacement patch.        Disposition: Patient at this time requires IV antibiotics. After culture sensitivities are received antibiotics can be narrowed and switched to PO. Do not anticipate discharge until tomorrow or .     SUBJECTIVE     Patient seen and examined. No acute events overnight. Patient resting on exam. She complains of moderate abdominal pain, noting it increases upon movement or deep inspiration. She is amenable to a pain management program to make sure she is able to rest comfortably. She also notes some acid reflux-like symptoms bothering her, discussed adding treatment for this as well. She was encouraged to try and eat as she is able, and to use her PRN zofran to help with any nausea she is having. She understands the continued need for IV antibiotics and symptom monitoring. She denies any specific concerns regarding her treatment at this time.     OBJECTIVE     Vitals:    24 0257 24 0347 24 0419 24 0746   BP:  96/54  95/58   BP Location:       Pulse:  55  65   Resp:  18     Temp: 98 °F (36.7 °C) 97.7 °F (36.5 °C)  97.8 °F (36.6 °C)   TempSrc: Oral      SpO2:  98%  100%   Weight:   45.4 kg (100 lb)    Height:   5' 3\" (1.6 m)       Temperature:   Temp (24hrs), Av.7 °F (37.1 °C), Min:97.7 °F (36.5 °C), Max:101.2 °F (38.4 °C)    Temperature: 97.8 °F (36.6 °C)  Intake & Output:  I/O          07 0700  0701   0700  07 0700           Unmeasured Urine Occurrence  1 x           Weights:   IBW (Ideal Body Weight): 52.4 kg    Body mass index is 17.71 kg/m².  Weight (last 2 days)       Date/Time Weight    24 0419 45.4 (100)          Physical Exam  Vitals reviewed.   Constitutional:       General: She is not in acute distress.     Appearance: Normal appearance. "   HENT:      Head: Normocephalic.      Nose: Nose normal.      Mouth/Throat:      Mouth: Mucous membranes are moist.   Cardiovascular:      Rate and Rhythm: Normal rate and regular rhythm.      Pulses: Normal pulses.      Heart sounds: Normal heart sounds.   Pulmonary:      Effort: Pulmonary effort is normal.      Breath sounds: Normal breath sounds.   Abdominal:      General: Bowel sounds are normal.      Tenderness: There is abdominal tenderness. There is right CVA tenderness and left CVA tenderness.   Musculoskeletal:         General: Normal range of motion.      Cervical back: Normal range of motion.   Skin:     General: Skin is warm and dry.      Capillary Refill: Capillary refill takes less than 2 seconds.   Neurological:      General: No focal deficit present.      Mental Status: She is alert. Mental status is at baseline.   Psychiatric:         Mood and Affect: Mood normal.         Thought Content: Thought content normal.       LABORATORY DATA     Labs: I have personally reviewed pertinent reports.  Results from last 7 days   Lab Units 09/05/24  0448 09/05/24  0239 09/04/24 2316 09/04/24  0236   WBC Thousand/uL 8.06  --  9.88 13.06*   HEMOGLOBIN g/dL 8.8*  --  9.2* 11.2*   HEMATOCRIT % 28.7*  --  29.3* 34.7*   PLATELETS Thousands/uL 232 210 244 305   SEGS PCT % 55  --  71 84*   MONO PCT % 15*  --  15* 10   EOS PCT % 1  --  0 0      Results from last 7 days   Lab Units 09/05/24  0448 09/04/24  2316 09/04/24  0236 09/03/24  1319   POTASSIUM mmol/L 3.5 3.4* 3.3* 3.6   CHLORIDE mmol/L 106 106 102 101   CO2 mmol/L 23 20* 19* 22   BUN mg/dL 6 7 10 10   CREATININE mg/dL 0.65 0.68 0.87 0.78   CALCIUM mg/dL 8.2* 8.3* 9.5 9.8   ALK PHOS U/L  --  39 50 50   ALT U/L  --  5* 7 7   AST U/L  --  11* 13 13              Results from last 7 days   Lab Units 09/03/24  1321   INR  1.01   PTT seconds 29               IMAGING & DIAGNOSTIC TESTING     Radiology Results: I have personally reviewed pertinent reports.  CT abdomen  "pelvis w contrast    Result Date: 9/5/2024  Impression: 1.  Mild heterogeneous bilateral renal parenchymal enhancement with minimal perinephric stranding suspicious for acute pyelonephritis. 2.  Complex hypodense area with ill-defined margins in the right renal upper pole suspicious for a developing abscess in this setting. Follow-up is recommended. 3.  Circumferential wall thickening of the urinary bladder consistent with cystitis. 4.  Diffuse bilateral ureteral wall thickening and enhancement likely on the basis of infectious ureteritis. The study was marked in EPIC for immediate notification. Workstation performed: [id number]     Other Diagnostic Testing: I have personally reviewed pertinent reports.    ACTIVE MEDICATIONS     Current Facility-Administered Medications   Medication Dose Route Frequency    acetaminophen (TYLENOL) tablet 975 mg  975 mg Oral Q6H PRN    cefTRIAXone (ROCEPHIN) 2,000 mg in dextrose 5 % 50 mL IVPB  2,000 mg Intravenous Q24H    enoxaparin (LOVENOX) subcutaneous injection 40 mg  40 mg Subcutaneous Daily    famotidine (PEPCID) tablet 20 mg  20 mg Oral Daily Before Breakfast    HYDROmorphone HCl (DILAUDID) injection 0.2 mg  0.2 mg Intravenous Q2H PRN    multi-electrolyte (PLASMALYTE-A/ISOLYTE-S PH 7.4) IV solution  100 mL/hr Intravenous Continuous    nicotine (NICODERM CQ) 14 mg/24hr TD 24 hr patch 14 mg  14 mg Transdermal Daily    ondansetron (ZOFRAN) injection 4 mg  4 mg Intravenous Q6H PRN    oxyCODONE (ROXICODONE) split tablet 2.5 mg  2.5 mg Oral Q4H PRN    Or    oxyCODONE (ROXICODONE) IR tablet 5 mg  5 mg Oral Q4H PRN       VTE Pharmacologic Prophylaxis: Sequential compression device (Venodyne)   VTE Mechanical Prophylaxis: sequential compression device    Portions of the record may have been created with voice recognition software.  Occasional wrong word or \"sound a like\" substitutions may have occurred due to the inherent limitations of voice recognition software.  Read the chart " carefully and recognize, using context, where substitutions have occurred.  ==  Anahi Bustos, MS4  [facility]

## 2024-09-05 NOTE — PROGRESS NOTES
INTERNAL MEDICINE RESIDENCY SENIOR ADMISSION NOTE     Name: Vannessa Birmingham   Age & Sex: 24 y.o. female   MRN: [id number]  Unit/Bed#: ED 19   Encounter: [id number]  Primary Care Provider: No primary care provider on file.    Admit to team: SOD Team A    Patient seen and examined. Reviewed H&P per Dr. Hess . Agree with the assessment and plan with any exception/addition as noted below:    Patient is a 24-year-old female with past medical history notable for chronic microcytic anemia and vape usage who originally presented to an outside emergency department on 9/3 with concerns for abdominal and flank pain.  Patient noted to be febrile at that time, and initial laboratory evaluation revealed mild leukocytosis (13.6), anemia at baseline (11.4), and UA with 2+ leukocytes, positive nitrites, 2+ protein, 1+ ketones, and 2+ occult blood; subsequent microscopy showed 4-10 RBCs, innumerable WBCs and moderate bacteria.  CT imaging obtained at that time notable for mild circumferential bladder wall thickening suggestive of cystitis.  At that juncture, patient was discharged on empiric cefdinir.  She later represented to a different emergency department with recurrent symptoms despite reported compliance with antibiotic therapy; she was again noted to be febrile at that time, but was eventually discharged with a prescription for Zofran and advised to return if symptoms persisted (this encounter was in the early morning of 9/4).  She states that she went home and attempted to rest but has had progressive worsening of her pain, which has now spread from primarily the right side of her body to encompass the left as well.  She also notes that her pain appears to be ascending and now encompasses the area just below her ribs, particularly on the right side.  She has continued to experience fevers and chills but denies any urinary symptoms including hematuria, dysuria, or frequency.  She does note that her nausea has been  slightly improved with p.o. Zofran though she did have several episodes of nonbloody nonbilious emesis prior to returning to the emergency department.  It should be noted that her original urine studies have since grown out 80-89,000 CFU of E. coli though sensitivities remain pending as of time of writing.    On arrival in the ED here, patient's vital signs include the following: Temp 101.2, pulse 94, respirations 20, /85, 96% on room air.  Repeat labs show interval reduction in her leukocytosis to 9.88 from 13.06 earlier in the day, a drop in her hemoglobin to 9.2 from 11.2, mild hypokalemia of 3.4, stable renal function with creatinine 0.68 from baseline 0.6-0.8, mild hypocalcemia of 8.3, and mildly low total protein levels at 6.1.  Repeat CT imaging again showing circumferential bladder thickening concerning for cystitis, though now also showing diffuse bilateral ureteral wall thickening and enhancement, mild bilateral renal parenchymal enhancement with adjacent perinephric stranding, and a complex hypodense area with ill-defined margins in the right upper renal pole concerning for a developing abscess.  The patient was treated with IV Zofran, 1 L fluid bolus, and a dose of ceftriaxone before medicine was contacted for admission.    Patient seen and examined alongside her partner.  In addition to the information described above, she reports that she has not had many issues with urinary tract infections during her life though did have an episode 1 year ago; however, she had actual urinary symptoms during this time.  Review of urine cultures from this time show growth of pansensitive E. coli.  She does note that she is chronically anemic and was previously on iron supplementation during her pregnancy but has not taken this for some time.  She does not take any medications at baseline though has been taking ibuprofen in addition to Zofran during her recent illness.  She is currently sexually active with her  partner and they do not use barrier methods, though she denies any vaginal symptomatology.  She does vape daily but denies any other drug use, including IV substances.  She is not aware of any open wounds on her person.  She denies any sick contacts or recent travel and has not had any other antibiotic usage or hospitalizations other than these encounters and various emergency departments..  She notes that she has been fully compliant with her antibiotic therapy and has not missed any doses since she was prescribed this.    On examination, she is somewhat ill-appearing but was not in any acute distress at the time of our encounter.  Mucous membranes are somewhat dry appearing auscultation of the heart reveals regular rate and rhythm, and lungs are clear to auscultation in all fields bilaterally.  Her abdomen is grossly tender in all quadrants with a focal preference towards the mid right upper quadrant though is without guarding or rebound tenderness.  She has bilateral CVA tenderness.  No lower extremity edema is noted.    Patient confirms that she is a level 1 full code.    Principal Problem:    Pyelonephritis  Active Problems:    Renal abscess    Anemia    History of nicotine vaping    Plan:  -Although some sources recommend broad-spectrum antibiotics in cases of renal abscess (due to potential for hematogenous source), will likely plan to continue CTX at present given known E. Coli on original culture and lack of risk factors for MDRO; narrow as able  -Overall low suspicion for hematogenous component to patient's reported renal abscess, but will obtain blood cultures for completeness; it should be noted that these were collected after patient had already received a dose of IV ceftriaxone and these may be unreliable  -Will also check sed rate given possible developing abscess  -Based on reported dimensions of patient's abscess (2.2 x 1.0 x 1.4 cm), percutaneous drainage does not seem feasible nor necessary at  least at this time; however, did discuss with patient that if she fails to respond to IV antibiotic therapy this may need to be considered in the future  -Pain control with Tylenol, Toradol and nausea control with Zofran  -Given poor reported oral intake, will run maintenance IV fluids for the next 10 hours  -Continue to monitor CBC for anemia as well as markers of infection; trend fever curve  -NRT while inpatient  -Rest of care as detailed by Dr. Hess    Code Status: Level 1 - Full Code  Admission Status: INPATIENT   Disposition: Patient requires Med/Surg  Expected Length of Stay: >2 midnights

## 2024-09-05 NOTE — ASSESSMENT & PLAN NOTE
Patient with 2-day history of back and right flank pain, anorexia, fatigue, that has developed fever/chills and worsening abdominal pain now on the left flank with imaging on 9/3 that was indicative of mild bladder wall thickening and associated cystitis.  Repeat CT abdomen/pelvis on 9/4 showed evidence of mild heterogeneous bilateral renal parenchymal enhancement with minimal perinephric stranding suspicious for acute pyelonephritis, complex hypodense area measuring 2.2 x 1.0 x 1.4 cm with ill-defined margins in the right renal upper pole suspicious for a developing abscess in this setting. Follow-up is recommended, circumferential wall thickening of the urinary bladder consistent with cystitis, and diffuse bilateral ureteral wall thickening and enhancement likely on the basis of infectious ureteritis.  Imaging suggestive of advancing disease and newly developing abscess.    Plan:  Ceftriaxone 2g with further antibiotic regimen to be guided by urine culture sensitivities  At this time we will not consult IR for drainage given the size of developing abscess, can consider consultation for drainage in the future if necessary

## 2024-09-05 NOTE — ASSESSMENT & PLAN NOTE
"Patient reports several weeks of episodes of \"acid coming up into throat\". She states this effects her appetite. She noted this happening this AM. Consider testing for h.pylori in future, so prefer H2 blocker over protonix at this time.    Plan:  Start on Pepcid 20mg daily  "

## 2024-09-05 NOTE — ASSESSMENT & PLAN NOTE
Mild hypokalemia at 3.4 likely as result of recent anorexia and nausea/vomiting.  Patient provided with 20 mEq KCl.  Currently on 100 mL of Isolyte maintenance fluid.  Continue trending on morning labs.    Resolved 9/5 AM

## 2024-09-05 NOTE — OCCUPATIONAL THERAPY NOTE
Occupational Therapy Screen        09/05/24 0839   OT Last Visit   OT Visit Date 09/05/24   Note Type   Note type Screen     OT orders received and chart reviewed. Made contact w/ Pt this AM and Pt reports being completely independent at this time. Pt states no acute OT concerns, no concerns for return home. D/c Pt from OT caseload. Please re-consult if new needs arise.     Charlene oHlland, BE, OTR/L

## 2024-09-06 VITALS
BODY MASS INDEX: 17.72 KG/M2 | HEART RATE: 53 BPM | SYSTOLIC BLOOD PRESSURE: 99 MMHG | OXYGEN SATURATION: 98 % | TEMPERATURE: 98.7 F | DIASTOLIC BLOOD PRESSURE: 61 MMHG | HEIGHT: 63 IN | RESPIRATION RATE: 18 BRPM | WEIGHT: 100 LBS

## 2024-09-06 LAB
ANION GAP SERPL CALCULATED.3IONS-SCNC: 7 MMOL/L (ref 4–13)
BASOPHILS # BLD AUTO: 0.02 THOUSANDS/ÂΜL (ref 0–0.1)
BASOPHILS NFR BLD AUTO: 0 % (ref 0–1)
BUN SERPL-MCNC: 4 MG/DL (ref 5–25)
CALCIUM SERPL-MCNC: 7.6 MG/DL (ref 8.4–10.2)
CHLORIDE SERPL-SCNC: 107 MMOL/L (ref 96–108)
CO2 SERPL-SCNC: 23 MMOL/L (ref 21–32)
CREAT SERPL-MCNC: 0.55 MG/DL (ref 0.6–1.3)
EOSINOPHIL # BLD AUTO: 0.05 THOUSAND/ÂΜL (ref 0–0.61)
EOSINOPHIL NFR BLD AUTO: 1 % (ref 0–6)
ERYTHROCYTE [DISTWIDTH] IN BLOOD BY AUTOMATED COUNT: 17.8 % (ref 11.6–15.1)
GFR SERPL CREATININE-BSD FRML MDRD: 131 ML/MIN/1.73SQ M
GLUCOSE SERPL-MCNC: 78 MG/DL (ref 65–140)
HAV IGM SER QL: NORMAL
HBV CORE IGM SER QL: NORMAL
HBV SURFACE AG SER QL: NORMAL
HCT VFR BLD AUTO: 26.5 % (ref 34.8–46.1)
HCV AB SER QL: NORMAL
HGB BLD-MCNC: 8.4 G/DL (ref 11.5–15.4)
HIV 1+2 AB+HIV1 P24 AG SERPL QL IA: NORMAL
HIV1 P24 AG SER QL: NORMAL
IMM GRANULOCYTES # BLD AUTO: 0.02 THOUSAND/UL (ref 0–0.2)
IMM GRANULOCYTES NFR BLD AUTO: 0 % (ref 0–2)
LIPASE SERPL-CCNC: 7 U/L (ref 11–82)
LYMPHOCYTES # BLD AUTO: 1.27 THOUSANDS/ÂΜL (ref 0.6–4.47)
LYMPHOCYTES NFR BLD AUTO: 26 % (ref 14–44)
MCH RBC QN AUTO: 25.2 PG (ref 26.8–34.3)
MCHC RBC AUTO-ENTMCNC: 31.7 G/DL (ref 31.4–37.4)
MCV RBC AUTO: 80 FL (ref 82–98)
MONOCYTES # BLD AUTO: 0.5 THOUSAND/ÂΜL (ref 0.17–1.22)
MONOCYTES NFR BLD AUTO: 10 % (ref 4–12)
NEUTROPHILS # BLD AUTO: 3.02 THOUSANDS/ÂΜL (ref 1.85–7.62)
NEUTS SEG NFR BLD AUTO: 63 % (ref 43–75)
NRBC BLD AUTO-RTO: 0 /100 WBCS
PLATELET # BLD AUTO: 194 THOUSANDS/UL (ref 149–390)
PMV BLD AUTO: 10.3 FL (ref 8.9–12.7)
POTASSIUM SERPL-SCNC: 3.7 MMOL/L (ref 3.5–5.3)
RBC # BLD AUTO: 3.33 MILLION/UL (ref 3.81–5.12)
SODIUM SERPL-SCNC: 137 MMOL/L (ref 135–147)
WBC # BLD AUTO: 4.88 THOUSAND/UL (ref 4.31–10.16)

## 2024-09-06 PROCEDURE — 99232 SBSQ HOSP IP/OBS MODERATE 35: CPT | Performed by: HOSPITALIST

## 2024-09-06 PROCEDURE — 87806 HIV AG W/HIV1&2 ANTB W/OPTIC: CPT

## 2024-09-06 PROCEDURE — 80048 BASIC METABOLIC PNL TOTAL CA: CPT

## 2024-09-06 PROCEDURE — 85025 COMPLETE CBC W/AUTO DIFF WBC: CPT

## 2024-09-06 PROCEDURE — 80074 ACUTE HEPATITIS PANEL: CPT

## 2024-09-06 PROCEDURE — 83690 ASSAY OF LIPASE: CPT

## 2024-09-06 RX ORDER — HYDROXYZINE HYDROCHLORIDE 25 MG/1
25 TABLET, FILM COATED ORAL ONCE
Status: COMPLETED | OUTPATIENT
Start: 2024-09-06 | End: 2024-09-06

## 2024-09-06 RX ORDER — FERROUS GLUCONATE 324(38)MG
324 TABLET ORAL EVERY OTHER DAY
Status: DISCONTINUED | OUTPATIENT
Start: 2024-09-06 | End: 2024-09-06

## 2024-09-06 RX ORDER — FERROUS SULFATE 325(65) MG
325 TABLET ORAL EVERY OTHER DAY
Status: DISCONTINUED | OUTPATIENT
Start: 2024-09-06 | End: 2024-09-07 | Stop reason: HOSPADM

## 2024-09-06 RX ORDER — PROCHLORPERAZINE MALEATE 5 MG
5 TABLET ORAL EVERY 6 HOURS PRN
Status: DISCONTINUED | OUTPATIENT
Start: 2024-09-06 | End: 2024-09-07 | Stop reason: HOSPADM

## 2024-09-06 RX ADMIN — HYDROMORPHONE HYDROCHLORIDE 0.2 MG: 0.2 INJECTION, SOLUTION INTRAMUSCULAR; INTRAVENOUS; SUBCUTANEOUS at 17:23

## 2024-09-06 RX ADMIN — HYDROXYZINE HYDROCHLORIDE 25 MG: 25 TABLET, FILM COATED ORAL at 00:53

## 2024-09-06 RX ADMIN — SODIUM CHLORIDE, SODIUM GLUCONATE, SODIUM ACETATE, POTASSIUM CHLORIDE, MAGNESIUM CHLORIDE, SODIUM PHOSPHATE, DIBASIC, AND POTASSIUM PHOSPHATE 100 ML/HR: .53; .5; .37; .037; .03; .012; .00082 INJECTION, SOLUTION INTRAVENOUS at 17:19

## 2024-09-06 RX ADMIN — FAMOTIDINE 20 MG: 20 TABLET, FILM COATED ORAL at 06:06

## 2024-09-06 RX ADMIN — CEFTRIAXONE SODIUM 2000 MG: 10 INJECTION, POWDER, FOR SOLUTION INTRAVENOUS at 23:04

## 2024-09-06 RX ADMIN — OXYCODONE HYDROCHLORIDE 5 MG: 5 TABLET ORAL at 22:30

## 2024-09-06 RX ADMIN — OXYCODONE HYDROCHLORIDE 5 MG: 5 TABLET ORAL at 09:08

## 2024-09-06 RX ADMIN — OXYCODONE HYDROCHLORIDE 5 MG: 5 TABLET ORAL at 16:04

## 2024-09-06 RX ADMIN — ACETAMINOPHEN 975 MG: 325 TABLET ORAL at 00:02

## 2024-09-06 RX ADMIN — HYDROMORPHONE HYDROCHLORIDE 0.2 MG: 0.2 INJECTION, SOLUTION INTRAMUSCULAR; INTRAVENOUS; SUBCUTANEOUS at 23:04

## 2024-09-06 RX ADMIN — FERROUS SULFATE TAB 325 MG (65 MG ELEMENTAL FE) 325 MG: 325 (65 FE) TAB at 11:37

## 2024-09-06 RX ADMIN — OXYCODONE HYDROCHLORIDE 5 MG: 5 TABLET ORAL at 00:54

## 2024-09-06 RX ADMIN — ENOXAPARIN SODIUM 40 MG: 40 INJECTION SUBCUTANEOUS at 09:08

## 2024-09-06 NOTE — PROGRESS NOTES
INTERNAL MEDICINE RESIDENCY PROGRESS NOTE     Name: Vannessa Birmingham   Age & Sex: 24 y.o. female   MRN: [id number]  Unit/Bed#: -01   Encounter: [id number]  Team: SOD Team A    PATIENT INFORMATION     Name: Vannessa Birmingham   Age & Sex: 24 y.o. female   MRN: [id number]  Hospital Stay Days: 1    ASSESSMENT/PLAN     Principal Problem:    Pyelonephritis  Active Problems:    Renal abscess    Anemia    GERD (gastroesophageal reflux disease)    History of nicotine vaping    Hypokalemia      * Pyelonephritis  Assessment & Plan  Patient originally presented to outside emergency department approximately 2 days ago with complaints of back pain, flank pain, and fatigue, predominantly on the right side.  At least at that time she denied any systemic symptoms or urinary complaints.  Studies from that time notable for mild leukocytosis of 13.6 with UA showing 2+ leukocytes, positive nitrites, 2+ occult blood and microscopy showing 4-10 RBC, innumerable WBC and moderate bacteria.      CT imaging from that admission showed mild nonspecific circumferential bladder wall thickening suggestive of cystitis and patient was ultimately discharged home on a 10-day course of cefdinir.  It should be noted that urine culture obtained during this time has preliminary growth of 80-89K E. coli with susceptibilities pending as of time of writing.  Patient returned to a different emergency department early in the morning of the following day (9/4) with ongoing symptoms, particularly nausea.  During this evaluation, she was given Zofran and IV fluids with some improvement in her symptoms and she was encouraged to follow-up with her PCP for ongoing care.  She returned to our emergency department later in the day with further symptom recurrence as well as spreading of her symptoms to the left flank.      Interestingly, her CBC shows interval improvement of her leukocytosis to 9.88 with decreased percentage of neutrophils; however,  patient again noted to be febrile and repeat imaging now noted mild bilateral renal parenchymal enhancement with some adjacent perinephric stranding.  Also noted was a complex hypodense area with ill-defined margins in the right upper renal pole concerning for developing abscess also noted were ongoing bladder wall thickening and diffuse bilateral ureteral wall thickening and enhancement.    Patient was given 3 doses of ceftriaxone prior to admission.  She was also given Toradol, Zofran, and 1 L fluid bolus.    Urine cultures have returned with 80,000-90,000 E.coli, which are pan-sensitive to antibiotics. Blood cultures still pending at this time.     Clinically she is having epigastric, RUQ, and suprapubic pain that radiates to her back. She also has bilateral flank pain. Given the epigastric pain lipase ordered to assess for pancreatitis.     As of 9/6 she has been without fever for >24 hours. Leukocytosis has resolved.     Plan  Given concern for renal abscess, will continue ceftriaxone for 1-2 more days  Urine Cx resulted with pan-sensitivity to abx  Lipase in progress  Patient currently on as needed Tylenol 975 mg every 6 abdominal pain and oxycodone 2.5mg/5mg every 6 as needed for moderate/severe pain, dilaudid for breakthrough pain  100 mL Isolyte maintenance fluids as she is having difficulty with oral intake, advance diet as tolerated  Zofran/compazine every 6 hours as needed for nausea/vomiting  Blood cx pending. No growth at 24 hours  PT/OT, case management consult in place        Renal abscess  Assessment & Plan  Patient with 2-day history of back and right flank pain, anorexia, fatigue, that has developed fever/chills and worsening abdominal pain now on the left flank with imaging on 9/3 that was indicative of mild bladder wall thickening and associated cystitis.  Repeat CT abdomen/pelvis on 9/4 showed evidence of mild heterogeneous bilateral renal parenchymal enhancement with minimal perinephric  "stranding suspicious for acute pyelonephritis, complex hypodense area measuring 2.2 x 1.0 x 1.4 cm with ill-defined margins in the right renal upper pole suspicious for a developing abscess in this setting. Follow-up is recommended, circumferential wall thickening of the urinary bladder consistent with cystitis, and diffuse bilateral ureteral wall thickening and enhancement likely on the basis of infectious ureteritis.  Imaging suggestive of advancing disease and newly developing abscess.    Plan:  Ceftriaxone 2g with further antibiotic regimen to be guided by urine culture sensitivities  At this time we will not consult IR for drainage given the size of developing abscess, can consider consultation for drainage in the future if necessary    Anemia  Assessment & Plan  Patient noted to have microcytic anemia on most recent blood work obtained during admission, with hemoglobin 9.2 and MCV 78.  On chart review, it appears that patient is chronically anemic with baseline hemoglobin appearing to range from 9-11 since 2018.  Recent drop from 11.2 to 8.8 with no obvious signs of bleeding and likely secondary to recent volume supplementation which over the course last 2 days totals over 2.5 L. Utilizing Ganzoni equation for anemia gives a total iron deficit of 848mg. Absolute reticulocyte is 0.8% based on reticulocyte count of 0.92%. Consider etiology secondary to iron deficiency, pernicious anemia.     Will hold IV iron supplementation now given poor reaction to venofer on 9/5, but oral supplementation ordered. Continue to workup outpatient.     Plan  Monitor CBC  Supplement iron 325mg PO  Consider pernicious anemia Abs outpatient    GERD (gastroesophageal reflux disease)  Assessment & Plan  Patient reports several weeks of episodes of \"acid coming up into throat\". She states this effects her appetite. She noted this happening this AM. Consider testing for h.pylori in future, so prefer H2 blocker over protonix at this " "time.    Plan:  Pepcid 20mg daily, can increase to 40mg if needed.     Hypokalemia  Assessment & Plan  Mild hypokalemia at 3.4 likely as result of recent anorexia and nausea/vomiting.  Patient provided with 20 mEq KCl.  Currently on 100 mL of Isolyte maintenance fluid.  Continue trending on morning labs.    Resolved 9/5 AM    History of nicotine vaping  Assessment & Plan  Patient reports vaping nicotine every day.  Reports desire for nicotine replacement while in the hospital.  Have started patient grams nicotine replacement patch.        Disposition: Patient still requires inpatient treatment. Potential for discharge this weekend pending improvement clinically.      SUBJECTIVE     Patient seen and examined. Overnight while iron solution was infusing patient had severe pain at IV site and the feeling of itchiness and \"sandpaper feeling\" all over her body. The infusion was stopped at that time, she was given benadryl, atarax, and pain medications. The infusion was then discontinued. She also had 4-5 episodes of vomiting overnight, which ultimately resolved after several anti-nausea medications.     On exam today she complains of continued moderate-severe pain in epigastric and upper right quadrant areas, soreness in bilateral flanks, and suprapubic pain that radiates to her back. She was encouraged to eat and drink as she is able. She is comfortable staying in the hospital another day to ensure she is improving on the current regimen.     OBJECTIVE     Vitals:    24 1447 24 2232 24 0200 24 0756   BP: 90/57 107/63  98/53   Pulse: 58 63  (!) 53   Resp:       Temp: 98.2 °F (36.8 °C) 98.1 °F (36.7 °C)  98.4 °F (36.9 °C)   TempSrc:       SpO2: 99% 100% 95% 98%   Weight:       Height:          Temperature:   Temp (24hrs), Av.2 °F (36.8 °C), Min:98.1 °F (36.7 °C), Max:98.4 °F (36.9 °C)    Temperature: 98.4 °F (36.9 °C)  Intake & Output:  I/O          07 07 07 07 " 09/06 0701  09/07 0700    P.O.  360     Total Intake(mL/kg)  360 (7.9)     Net  +360            Unmeasured Urine Occurrence 1 x            Weights:   IBW (Ideal Body Weight): 52.4 kg    Body mass index is 17.71 kg/m².  Weight (last 2 days)       Date/Time Weight    09/05/24 0419 45.4 (100)          Physical Exam  Constitutional:       Appearance: Normal appearance.   HENT:      Head: Normocephalic.      Mouth/Throat:      Mouth: Mucous membranes are moist.   Eyes:      Conjunctiva/sclera: Conjunctivae normal.   Cardiovascular:      Rate and Rhythm: Normal rate and regular rhythm.      Pulses: Normal pulses.      Heart sounds: Normal heart sounds.   Pulmonary:      Effort: Pulmonary effort is normal.      Breath sounds: Normal breath sounds.   Abdominal:      Tenderness: There is abdominal tenderness. There is right CVA tenderness and left CVA tenderness.      Comments: Abdominal pain in epigastric, RUQ, suprapubic, bilateral flanks   Musculoskeletal:      Cervical back: Normal range of motion.   Neurological:      Mental Status: She is alert.       LABORATORY DATA     Labs: I have personally reviewed pertinent reports.  Results from last 7 days   Lab Units 09/06/24  0855 09/05/24 0448 09/05/24 0239 09/04/24  2316   WBC Thousand/uL 4.88 8.06  --  9.88   HEMOGLOBIN g/dL 8.4* 8.8*  --  9.2*   HEMATOCRIT % 26.5* 28.7*  --  29.3*   PLATELETS Thousands/uL 194 232 210 244   SEGS PCT % 63 55  --  71   MONO PCT % 10 15*  --  15*   EOS PCT % 1 1  --  0      Results from last 7 days   Lab Units 09/06/24  0855 09/05/24 0448 09/04/24  2316 09/04/24  0236 09/03/24  1319   POTASSIUM mmol/L 3.7 3.5 3.4* 3.3* 3.6   CHLORIDE mmol/L 107 106 106 102 101   CO2 mmol/L 23 23 20* 19* 22   BUN mg/dL 4* 6 7 10 10   CREATININE mg/dL 0.55* 0.65 0.68 0.87 0.78   CALCIUM mg/dL 7.6* 8.2* 8.3* 9.5 9.8   ALK PHOS U/L  --   --  39 50 50   ALT U/L  --   --  5* 7 7   AST U/L  --   --  11* 13 13              Results from last 7 days   Lab Units  09/03/24  1321   INR  1.01   PTT seconds 29               IMAGING & DIAGNOSTIC TESTING     Radiology Results: I have personally reviewed pertinent reports.  CT abdomen pelvis w contrast    Result Date: 9/5/2024  Impression: 1.  Mild heterogeneous bilateral renal parenchymal enhancement with minimal perinephric stranding suspicious for acute pyelonephritis. 2.  Complex hypodense area with ill-defined margins in the right renal upper pole suspicious for a developing abscess in this setting. Follow-up is recommended. 3.  Circumferential wall thickening of the urinary bladder consistent with cystitis. 4.  Diffuse bilateral ureteral wall thickening and enhancement likely on the basis of infectious ureteritis. The study was marked in EPIC for immediate notification. Workstation performed: [id number]     Other Diagnostic Testing: I have personally reviewed pertinent reports.    ACTIVE MEDICATIONS     Current Facility-Administered Medications   Medication Dose Route Frequency    acetaminophen (TYLENOL) tablet 975 mg  975 mg Oral Q6H PRN    cefTRIAXone (ROCEPHIN) 2,000 mg in dextrose 5 % 50 mL IVPB  2,000 mg Intravenous Q24H    enoxaparin (LOVENOX) subcutaneous injection 40 mg  40 mg Subcutaneous Daily    famotidine (PEPCID) tablet 20 mg  20 mg Oral Daily Before Breakfast    ferrous sulfate tablet 325 mg  325 mg Oral Every Other Day    HYDROmorphone HCl (DILAUDID) injection 0.2 mg  0.2 mg Intravenous Q2H PRN    multi-electrolyte (PLASMALYTE-A/ISOLYTE-S PH 7.4) IV solution  100 mL/hr Intravenous Continuous    nicotine (NICODERM CQ) 14 mg/24hr TD 24 hr patch 14 mg  14 mg Transdermal Daily    ondansetron (ZOFRAN) injection 4 mg  4 mg Intravenous Q6H PRN    oxyCODONE (ROXICODONE) split tablet 2.5 mg  2.5 mg Oral Q4H PRN    Or    oxyCODONE (ROXICODONE) IR tablet 5 mg  5 mg Oral Q4H PRN    prochlorperazine (COMPAZINE) tablet 5 mg  5 mg Oral Q6H PRN       VTE Pharmacologic Prophylaxis: Enoxaparin (Lovenox)  VTE Mechanical  "Prophylaxis: sequential compression device    Portions of the record may have been created with voice recognition software.  Occasional wrong word or \"sound a like\" substitutions may have occurred due to the inherent limitations of voice recognition software.  Read the chart carefully and recognize, using context, where substitutions have occurred.  ==  Anahi Bustos, MS4  [facility]        "

## 2024-09-06 NOTE — PLAN OF CARE
Problem: PAIN - ADULT  Goal: Verbalizes/displays adequate comfort level or baseline comfort level  Description: Interventions:  - Encourage patient to monitor pain and request assistance  - Assess pain using appropriate pain scale  - Administer analgesics based on type and severity of pain and evaluate response  - Implement non-pharmacological measures as appropriate and evaluate response  - Consider cultural and social influences on pain and pain management  - Notify physician/advanced practitioner if interventions unsuccessful or patient reports new pain  Outcome: Progressing     Problem: INFECTION - ADULT  Goal: Absence or prevention of progression during hospitalization  Description: INTERVENTIONS:  - Assess and monitor for signs and symptoms of infection  - Monitor lab/diagnostic results  - Monitor all insertion sites, i.e. indwelling lines, tubes, and drains  - Monitor endotracheal if appropriate and nasal secretions for changes in amount and color  - Ruth appropriate cooling/warming therapies per order  - Administer medications as ordered  - Instruct and encourage patient and family to use good hand hygiene technique  - Identify and instruct in appropriate isolation precautions for identified infection/condition  Outcome: Progressing  Goal: Absence of fever/infection during neutropenic period  Description: INTERVENTIONS:  - Monitor WBC    Outcome: Progressing     Problem: SAFETY ADULT  Goal: Patient will remain free of falls  Description: INTERVENTIONS:  - Educate patient/family on patient safety including physical limitations  - Instruct patient to call for assistance with activity   - Consult OT/PT to assist with strengthening/mobility   - Keep Call bell within reach  - Keep bed low and locked with side rails adjusted as appropriate  - Keep care items and personal belongings within reach  - Initiate and maintain comfort rounds  - Make Fall Risk Sign visible to staff  - Offer Toileting every  Hours,  in advance of need  - Initiate/Maintain alarm  - Obtain necessary fall risk management equipment:   - Apply yellow socks and bracelet for high fall risk patients  - Consider moving patient to room near nurses station  Outcome: Progressing  Goal: Maintain or return to baseline ADL function  Description: INTERVENTIONS:  -  Assess patient's ability to carry out ADLs; assess patient's baseline for ADL function and identify physical deficits which impact ability to perform ADLs (bathing, care of mouth/teeth, toileting, grooming, dressing, etc.)  - Assess/evaluate cause of self-care deficits   - Assess range of motion  - Assess patient's mobility; develop plan if impaired  - Assess patient's need for assistive devices and provide as appropriate  - Encourage maximum independence but intervene and supervise when necessary  - Involve family in performance of ADLs  - Assess for home care needs following discharge   - Consider OT consult to assist with ADL evaluation and planning for discharge  - Provide patient education as appropriate  Outcome: Progressing  Goal: Maintains/Returns to pre admission functional level  Description: INTERVENTIONS:  - Perform AM-PAC 6 Click Basic Mobility/ Daily Activity assessment daily.  - Set and communicate daily mobility goal to care team and patient/family/caregiver.   - Collaborate with rehabilitation services on mobility goals if consulted  - Perform Range of Motion  times a day.  - Reposition patient every  hours.  - Dangle patient  times a day  - Stand patient  times a day  - Ambulate patient  times a day  - Out of bed to chair  times a day   - Out of bed for meals  times a day  - Out of bed for toileting  - Record patient progress and toleration of activity level   Outcome: Progressing     Problem: DISCHARGE PLANNING  Goal: Discharge to home or other facility with appropriate resources  Description: INTERVENTIONS:  - Identify barriers to discharge w/patient and caregiver  - Arrange for  needed discharge resources and transportation as appropriate  - Identify discharge learning needs (meds, wound care, etc.)  - Arrange for interpretive services to assist at discharge as needed  - Refer to Case Management Department for coordinating discharge planning if the patient needs post-hospital services based on physician/advanced practitioner order or complex needs related to functional status, cognitive ability, or social support system  Outcome: Progressing     Problem: Knowledge Deficit  Goal: Patient/family/caregiver demonstrates understanding of disease process, treatment plan, medications, and discharge instructions  Description: Complete learning assessment and assess knowledge base.  Interventions:  - Provide teaching at level of understanding  - Provide teaching via preferred learning methods  Outcome: Progressing     Problem: Nutrition/Hydration-ADULT  Goal: Nutrient/Hydration intake appropriate for improving, restoring or maintaining nutritional needs  Description: Monitor and assess patient's nutrition/hydration status for malnutrition. Collaborate with interdisciplinary team and initiate plan and interventions as ordered.  Monitor patient's weight and dietary intake as ordered or per policy. Utilize nutrition screening tool and intervene as necessary. Determine patient's food preferences and provide high-protein, high-caloric foods as appropriate.     INTERVENTIONS:  - Monitor oral intake, urinary output, labs, and treatment plans  - Assess nutrition and hydration status and recommend course of action  - Evaluate amount of meals eaten  - Assist patient with eating if necessary   - Allow adequate time for meals  - Recommend/ encourage appropriate diets, oral nutritional supplements, and vitamin/mineral supplements  - Order, calculate, and assess calorie counts as needed  - Recommend, monitor, and adjust tube feedings and TPN/PPN based on assessed needs  - Assess need for intravenous fluids  -  Provide specific nutrition/hydration education as appropriate  - Include patient/family/caregiver in decisions related to nutrition  Outcome: Progressing

## 2024-09-06 NOTE — DISCHARGE SUMMARY
INTERNAL MEDICINE RESIDENCY DISCHARGE SUMMARY     Vannessa Birmingham   24 y.o. female  MRN: [id number]  Room/Bed: /-01     [facility] BE MED SURG 7   Encounter: [id number]    Principal Problem:    Pyelonephritis with Renal Abscess  Active Problems:    Anemia    Nexplanon in place    History of nicotine vaping    GERD (gastroesophageal reflux disease)      * Pyelonephritis with Renal Abscess  Assessment & Plan  Patiently initially presented to [facility] 9/3 ED for UTI with CT only showing cystitis discharged with cefdinir completed 2 days of 10-day course.  Patient ultimately returned on 9/4 to [facility] with repeat CT on 9/4 showing new  mild heterogeneous bilateral renal parenchymal enhancement with minimal perinephric stranding suspicious for acute pyelonephritis and cystitis. UCX positive E coli pan sensitivities. Admitted on Cefepime 1d and transitioned to CTX (SOT 9/4) for total of 3d treatment. BCX NG 48hrs. As patient remained AF and HD stable pt dc on Levaquin for 12d.    Urine Cx resulted with pan-sensitivity to abx  Lipase neg    Plan  Given concern for renal abscess, will continue Levaquin 500mg qd for 12days (SOT 9/7)  Dc on small amount prn opoids given severe flank pain  Prn zofran on dc  Pt informed to return to ed if worsening pain / fever / overall feeling sick  Will need follow-up with PCP to est care     Anemia  Assessment & Plan  Patient noted to have microcytic anemia on most recent blood work obtained during admission, with hemoglobin 9.2 and MCV 78.  On chart review, it appears that patient is chronically anemic with baseline hemoglobin appearing to range from 9-11 since 2018.  Recent drop from 11.2 to 8.8 with no obvious signs of bleeding and likely secondary to recent volume supplementation which over the course last 2 days totals over 2.5 L. Utilizing Ganzoni equation for anemia gives a total iron deficit of 848mg.  "Absolute reticulocyte is 0.8% based on reticulocyte count of 0.92%. Consider etiology secondary to iron deficiency, pernicious anemia.     Will hold IV iron supplementation now given poor reaction to venofer on 9/5, but oral supplementation ordered. Continue to workup outpatient.     Plan  Supplement iron 325mg PO every other day  Consider pernicious anemia Abs outpatient    Nexplanon in place  Assessment & Plan  Nexplanon placed 2018. Has since had breakthrough bleeding. Requesting to be referred to obgyn on dc.    GERD (gastroesophageal reflux disease)  Assessment & Plan  Patient reports several weeks of episodes of \"acid coming up into throat\". She states this effects her appetite. She noted this happening this AM. Consider testing for h.pylori in future, so prefer H2 blocker over protonix at this time.    Plan:  Start on Pepcid 20mg daily    History of nicotine vaping  Assessment & Plan  Patient reports vaping nicotine every day.     Follow-up with pcp outpt regarding smoking cessation.    Hypokalemia-resolved as of 9/7/2024  Assessment & Plan  Mild hypokalemia at 3.4 likely as result of recent anorexia and nausea/vomiting.  Patient provided with 20 mEq KCl.  Currently on 100 mL of Isolyte maintenance fluid.  Continue trending on morning labs.    Resolved 9/5 AM        DETAILS OF HOSPITAL COURSE     Pt is a 23yo F with a PMH of iron deficiency anemia in pregnancy as well as Nexplanon that was placed 2018. Patiently initially presented to [facility] 9/3 ED for UTI with CT 9/3 only showing cystitis at the time discharged. Patient was then dc with cefdinir however only completed 2 days of 10-day course.  Patient ultimately returned next day 9/4 to [facility] with repeat CT on 9/4 showing new  mild heterogeneous bilateral renal parenchymal enhancement with minimal perinephric stranding suspicious for acute pyelonephritis and cystitis. UCX positive for E. Coli with pan sensitivities. Admitted on Cefepime x1 " dose prior to transitioned to CTX (SOT 9/4) for total of 3d treatment. BCX NG 48hrs.     On the day of discharge, patient sitting up in bed with partner at the side.  Stating that overall she is able to eat now and overall feeling more improved with no fevers or chills.  Still continuing to have some right flank pain but overall improved since admission.  As patient remained AF and HD stable pt dc on Levaquin for 12d.  Patient confirmed that she agreed with discharge and was told to return to emergency room if having worsening fevers chills severe flank pain or concerns of overall worsening infection.  Patient requested to establish care with an OB/GYN given she has had a Nexplanon in since 2018.     Physical Exam:  General: No apparent distress, resting comfortably   Head: Normocephalic, atraumatic  Eyes: Anicteric, no conjunctival erythema  ENT: External ear normal, no nasal discharge  Neck: Trachea midline, no visible lymphadenopathy or goiter  Respiratory: Non-labored respirations, symmetric thorax expansion  Cardiovascular:  Extremities appear well-perfused  Abdomen: Non-distended  Extremities: Moves extremities spontaneously, no peripheral edema  Skin: No visible rashes, wounds, or jaundice  Neuro: A&O x 3, no gross focal deficits, no aphasia    Patient discharged with following instruction:  Please see a PCP within 7 to 14 days of discharge. I have provided the number for our [facility] office in [place] ([phone]) if you would like to establish with us. In addition, I have also given you the number for the [facility] office which is closer to your home if he would like to establish care there.  Please finish the total course of your Levaquin antibiotics for total of 12 days do not skip any doses.  Use sunscreen if going outside as you may get skin irritation.  Can take with food.  I am referring you to an OB/GYN as requested  During her hospitalization you are also found to have  anemia due to iron deficiency.  I am discharging you on iron supplement to take every other day for best absorption he should follow-up with PCP regarding possible outpatient iron infusions.       DISCHARGE INFORMATION     PCP at Discharge:  AUDREY    Admitting Provider: Jamaal Chen MD  Admission Date: 9/4/2024    Discharge Provider: Jamaal Chen MD  Discharge Date: 09/07/24    Discharge Disposition: Home/Self Care  Discharge Condition: stable  Discharge with Lines: no    Discharge Diet: regular diet  Activity Restrictions: none  Test Results Pending at Discharge: none    Discharge Diagnoses:  Principal Problem:    Pyelonephritis with Renal Abscess  Active Problems:    Anemia    Nexplanon in place    History of nicotine vaping    GERD (gastroesophageal reflux disease)  Resolved Problems:    * No resolved hospital problems. *      Consulting Providers:      Diagnostic & Therapeutic Procedures Performed:  CT abdomen pelvis w contrast    Result Date: 9/5/2024  Impression: 1.  Mild heterogeneous bilateral renal parenchymal enhancement with minimal perinephric stranding suspicious for acute pyelonephritis. 2.  Complex hypodense area with ill-defined margins in the right renal upper pole suspicious for a developing abscess in this setting. Follow-up is recommended. 3.  Circumferential wall thickening of the urinary bladder consistent with cystitis. 4.  Diffuse bilateral ureteral wall thickening and enhancement likely on the basis of infectious ureteritis. The study was marked in EPIC for immediate notification. Workstation performed: [id number]       Code Status: Level 1 - Full Code  Advance Directive & Living Will: <no information>  Power of :    POLST:      Medications:  Current Discharge Medication List        Current Discharge Medication List        Current Discharge Medication List        CONTINUE these medications which have NOT CHANGED    Details   cefdinir (OMNICEF) 300 mg capsule Take 1 capsule (300 mg total)  "by mouth every 12 (twelve) hours for 10 days  Qty: 20 capsule, Refills: 0    Associated Diagnoses: Pyelonephritis      ibuprofen (MOTRIN) 800 mg tablet Take 1 tablet (800 mg total) by mouth 3 (three) times a day  Qty: 21 tablet, Refills: 0    Associated Diagnoses: Pain, dental      ondansetron (ZOFRAN) 4 mg tablet Take 1 tablet (4 mg total) by mouth every 6 (six) hours  Qty: 20 tablet, Refills: 0    Associated Diagnoses: Nausea and vomiting             Allergies:  No Known Allergies    FOLLOW-UP     PCP Outpatient Follow-up:  TBD    Consulting Providers Follow-up:         Active Issues Requiring Follow-up:   yes  Flank pain / ensure pylonephritis resolved; anemia; neplaxon in place wishing removal    Discharge Statement:   I spent 30 minutes minutes discharging the patient. This time was spent on the day of discharge. I had direct contact with the patient on the day of discharge. Additional documentation is required if more than 30 minutes were spent on discharge.    Portions of the record may have been created with voice recognition software.  Occasional wrong word or \"sound a like\" substitutions may have occurred due to the inherent limitations of voice recognition software.  Read the chart carefully and recognize, using context, where substitutions have occurred.    ==  Carmela Hess DO  [facility]  Internal Medicine Resident PGY-2   "

## 2024-09-06 NOTE — UTILIZATION REVIEW
Initial Clinical Review    Admission: Date/Time/Statement:   Admission Orders (From admission, onward)       Ordered        09/05/24 0136  INPATIENT ADMISSION  Once                          Orders Placed This Encounter   Procedures    INPATIENT ADMISSION     Standing Status:   Standing     Number of Occurrences:   1     Order Specific Question:   Level of Care     Answer:   Med Surg [16]     Order Specific Question:   Estimated length of stay     Answer:   More than 2 Midnights     Order Specific Question:   Certification     Answer:   I certify that inpatient services are medically necessary for this patient for a duration of greater than two midnights. See H&P and MD Progress Notes for additional information about the patient's course of treatment.     ED Arrival Information       Expected   -    Arrival   9/4/2024 22:08    Acuity   Urgent              Means of arrival   Walk-In    Escorted by   Spouse    Service   [facility]    Admission type   Emergency              Arrival complaint   UTI Symps             Chief Complaint   Patient presents with    Abdominal Pain     Pt c/o generalized abdominal pain and generalized body aches x3 days. Reports fevers, N/V. Denies urinary symptoms        Initial Presentation: 24 y.o. female to ED as a walk-in Admitted Inpatient to [facility] Unit with Pyelonephritis and possible Renal Abscess.  Pt initially presented to ED 2d ago with c/o back pain, flank pain, and fatigue, predominantly on the right side with w/u pos for cystitis and d/c'd home on a 10-day course of Cefdinir. Pt then presented to another ED the following day (9/4) with ongoing symptoms, particularly nausea, given Zofran and IVFs with some improvement and d/c'd home with op f/u. Pt then presented to our ED later in the day with further symptom recurrence as well as spreading of her symptoms to the left flank.   On ED eval currently - T 101.2, WBCs 9.88, repeat imaging noted mild b/l renal parenchymal enhancement  with some adjacent perinephric stranding, now with concern for developing abscess. Diffuse abdominal tenderness palpation that is most prominent in the RUQ on exam.  Given ceftriaxone, Toradol, Zofran and 1L IVFs in ED.  Plan: continue ceftriaxone. F/u urine cx and blood cx. Tylenol and Toradol prn pain. IVFs. Zofran prn.      Date: 9/6   Day 2: pt states feeling somewhat better but still with poor PO intake. Afebrile. Continue IV ceftriaxone currently. F/u urine and blood cxs. Reg diet. Continue IVFs. Analgesics/antiemetics prn. OOB/ambulate. Supportive care.       ED Triage Vitals [09/04/24 2212]   Temperature Pulse Respirations Blood Pressure SpO2 Pain Score   (!) 101.2 °F (38.4 °C) 94 20 109/85 96 % 10 - Worst Possible Pain     Weight (last 2 days)       Date/Time Weight    09/05/24 0419 45.4 (100)            Vital Signs (last 3 days)       Date/Time Temp Pulse Resp BP MAP (mmHg) SpO2 O2 Device Patient Position - Orthostatic VS Pain    09/06/24 0908 -- -- -- -- -- -- None (Room air) -- 7    09/06/24 07:56:23 98.4 °F (36.9 °C) 53 -- 98/53 68 98 % -- -- --    09/06/24 0200 -- -- -- -- -- 95 % None (Room air) -- --    09/06/24 0132 -- -- -- -- -- -- -- -- 3    09/06/24 0002 -- -- -- -- -- -- -- -- 9 09/05/24 22:32:13 98.1 °F (36.7 °C) 63 -- 107/63 78 100 % -- -- --    09/05/24 2153 -- -- -- -- -- -- -- -- 9 09/05/24 1831 -- -- -- -- -- -- -- -- 6    09/05/24 14:47:30 98.2 °F (36.8 °C) 58 -- 90/57 68 99 % -- -- --    09/05/24 0854 -- -- -- -- -- -- -- -- 4    09/05/24 07:46:30 97.8 °F (36.6 °C) 65 -- 95/58 70 100 % -- -- --    09/05/24 0419 -- -- -- -- -- -- -- -- 2    09/05/24 03:47:23 97.7 °F (36.5 °C) 55 18 96/54 68 98 % -- -- --    09/05/24 0257 98 °F (36.7 °C) -- -- -- -- -- -- -- --    09/04/24 2225 -- -- -- -- -- -- None (Room air) -- --    09/04/24 2212 101.2 °F (38.4 °C) 94 20 109/85 -- 96 % None (Room air) Sitting 10 - Worst Possible Pain          Pertinent Labs/Diagnostic Test Results:    Radiology:  CT abdomen pelvis w contrast   Final Interpretation by Adalgisa Love MD (09/05 0046)      1.  Mild heterogeneous bilateral renal parenchymal enhancement with minimal perinephric stranding suspicious for acute pyelonephritis.   2.  Complex hypodense area with ill-defined margins in the right renal upper pole suspicious for a developing abscess in this setting. Follow-up is recommended.   3.  Circumferential wall thickening of the urinary bladder consistent with cystitis.   4.  Diffuse bilateral ureteral wall thickening and enhancement likely on the basis of infectious ureteritis.        Results from last 7 days   Lab Units 09/04/24  0237   SARS-COV-2  Negative     Results from last 7 days   Lab Units 09/06/24  0855 09/05/24  0448 09/05/24  0239 09/04/24 2316 09/04/24 0236 09/03/24  1321   WBC Thousand/uL 4.88 8.06  --  9.88 13.06* 13.64*   HEMOGLOBIN g/dL 8.4* 8.8*  --  9.2* 11.2* 11.4*   HEMATOCRIT % 26.5* 28.7*  --  29.3* 34.7* 36.9   PLATELETS Thousands/uL 194 232 210 244 305 336   TOTAL NEUT ABS Thousands/µL 3.02 4.42  --  7.02 10.95* 10.30*     Results from last 7 days   Lab Units 09/05/24 0448   RETIC CT ABS  32,800   RETIC CT PCT % 0.92     Results from last 7 days   Lab Units 09/06/24  0855 09/05/24 0448 09/04/24 2316 09/04/24  0236 09/03/24  1319   SODIUM mmol/L 137 137 135 135 135   POTASSIUM mmol/L 3.7 3.5 3.4* 3.3* 3.6   CHLORIDE mmol/L 107 106 106 102 101   CO2 mmol/L 23 23 20* 19* 22   ANION GAP mmol/L 7 8 9 14* 12   BUN mg/dL 4* 6 7 10 10   CREATININE mg/dL 0.55* 0.65 0.68 0.87 0.78   EGFR ml/min/1.73sq m 131 124 122 93 106   CALCIUM mg/dL 7.6* 8.2* 8.3* 9.5 9.8     Results from last 7 days   Lab Units 09/04/24 2316 09/04/24  0236 09/03/24  1319   AST U/L 11* 13 13   ALT U/L 5* 7 7   ALK PHOS U/L 39 50 50   TOTAL PROTEIN g/dL 6.1* 7.7 7.8   ALBUMIN g/dL 3.6 4.3 4.5   TOTAL BILIRUBIN mg/dL 0.28 0.64 0.48       Results from last 7 days   Lab Units 09/06/24  0855 09/05/24  0448  09/04/24  2316 09/04/24  0236 09/03/24  1319   GLUCOSE RANDOM mg/dL 78 87 104 113 93     Results from last 7 days   Lab Units 09/03/24  1321   PROTIME seconds 13.8   INR  1.01   PTT seconds 29     Results from last 7 days   Lab Units 09/05/24  0448   FERRITIN ng/mL 31   IRON ug/dL <10*     Results from last 7 days   Lab Units 09/06/24  0513   HEP B S AG  Non-reactive   HEP C AB  Non-reactive   HEP B C IGM  Non-reactive     Results from last 7 days   Lab Units 09/06/24  0513 09/04/24  0236   LIPASE u/L 7* 13     Results from last 7 days   Lab Units 09/05/24  0239   SED RATE mm/hour 19     Results from last 7 days   Lab Units 09/04/24  0434 09/03/24  1320   CLARITY UA  Slightly Cloudy Hazy   COLOR UA  Yellow Yellow   SPEC GRAV UA  1.025 1.020   PH UA  6.0 6.0   GLUCOSE UA mg/dl Negative Negative   KETONES UA mg/dl 40 (2+)* 15 (1+)*   BLOOD UA  Small* 2+*   PROTEIN UA mg/dl 30 (1+)* 2+*   NITRITE UA  Negative Positive*   BILIRUBIN UA  Negative Negative   UROBILINOGEN UA E.U./dl  --  0.2   UROBILINOGEN UA (BE) mg/dl <2.0  --    LEUKOCYTES UA  Large* 2+*   WBC UA /hpf 20-30* Innumerable*   RBC UA /hpf 1-2* 4-10*   BACTERIA UA /hpf Occasional Moderate*   EPITHELIAL CELLS WET PREP /hpf Moderate* Occasional   MUCUS THREADS  Occasional*  --      Results from last 7 days   Lab Units 09/04/24  0237   INFLUENZA A PCR  Negative   INFLUENZA B PCR  Negative   RSV PCR  Negative       Results from last 7 days   Lab Units 09/05/24  0240 09/05/24  0239 09/03/24  1320   BLOOD CULTURE  No Growth at 24 hrs. No Growth at 24 hrs.  --    URINE CULTURE   --   --  80,000-89,000 cfu/ml Escherichia coli*     ED Treatment-Medication Administration from 09/04/2024 2208 to 09/05/2024 0310         Date/Time Order Dose Route Action     09/04/2024 2316 ondansetron (ZOFRAN) injection 4 mg 4 mg Intravenous Given     09/04/2024 2316 ketorolac (TORADOL) injection 15 mg 15 mg Intravenous Given     09/04/2024 2316 lactated ringers bolus 1,000 mL 1,000 mL  Intravenous New Bag     2024 0129 ceftriaxone (ROCEPHIN) 1 g/50 mL in dextrose IVPB 1,000 mg Intravenous New Bag     2024 0248 multi-electrolyte (PLASMALYTE-A/ISOLYTE-S PH 7.4) IV solution 100 mL/hr Intravenous New Bag         Past Medical History:   Diagnosis Date     2017    Anemia      Present on Admission:   GERD (gastroesophageal reflux disease)      Admitting Diagnosis: Abdominal pain [R10.9]  Pyelonephritis [N12]  Cystitis [N30.90]  Age/Sex: 24 y.o. female  Admission Orders:  Scheduled Medications:  cefTRIAXone, 2,000 mg, Intravenous, Q24H  enoxaparin, 40 mg, Subcutaneous, Daily  famotidine, 20 mg, Oral, Daily Before Breakfast  ferrous sulfate, 325 mg, Oral, Every Other Day  nicotine, 14 mg, Transdermal, Daily    Continuous IV Infusions:  multi-electrolyte, 100 mL/hr, Intravenous, Continuous    PRN Meds:  acetaminophen, 975 mg, Oral, Q6H PRN 9/5 x1, 9/6 x1  HYDROmorphone, 0.2 mg, Intravenous, Q2H PRN 9/5 x1  ondansetron, 4 mg, Intravenous, Q6H PRN 9/5 x3  oxyCODONE, 2.5 mg, Oral, Q4H PRN 9/5 x1   Or  oxyCODONE, 5 mg, Oral, Q4H PRN 9/6 x2  prochlorperazine, 5 mg, Oral, Q6H PRN         IP CONSULT TO CASE MANAGEMENT    Network Utilization Review Department  ATTENTION: Please call with any questions or concerns to [phone] and carefully listen to the prompts so that you are directed to the right person. All voicemails are confidential.   For Discharge needs, contact Care Management DC Support Team at [phone] opt. [phone]  Send all requests for admission clinical reviews, approved or denied determinations and any other requests to dedicated fax number below belonging to the campus where the patient is receiving treatment. List of dedicated fax numbers for the Facilities:  FACILITY NAME UR FAX NUMBER   ADMISSION DENIALS (Administrative/Medical Necessity) [phone]   DISCHARGE SUPPORT TEAM (NETWORK) [phone]   PARENT CHILD HEALTH (Maternity/NICU/Pediatrics) [phone]   [facility]  [facility] [phone]   [facility] [phone]   [facility] [phone]   [facility] [phone]   [facility] [phone]   [facility] [phone]   [facility] [phone]   [facility] [phone]   [facility] [phone]   [facility] [phone]   [facility] [phone]   [facility] [phone]

## 2024-09-07 PROBLEM — D64.9 ANEMIA: Status: RESOLVED | Noted: 2024-09-05 | Resolved: 2024-09-07

## 2024-09-07 PROBLEM — E87.6 HYPOKALEMIA: Status: RESOLVED | Noted: 2024-09-05 | Resolved: 2024-09-07

## 2024-09-07 PROBLEM — Z97.5 NEXPLANON IN PLACE: Status: ACTIVE | Noted: 2024-09-07

## 2024-09-07 LAB
ANION GAP SERPL CALCULATED.3IONS-SCNC: 8 MMOL/L (ref 4–13)
BASOPHILS # BLD AUTO: 0.03 THOUSANDS/ÂΜL (ref 0–0.1)
BASOPHILS NFR BLD AUTO: 1 % (ref 0–1)
BUN SERPL-MCNC: 3 MG/DL (ref 5–25)
CALCIUM SERPL-MCNC: 8.3 MG/DL (ref 8.4–10.2)
CHLORIDE SERPL-SCNC: 107 MMOL/L (ref 96–108)
CO2 SERPL-SCNC: 25 MMOL/L (ref 21–32)
CREAT SERPL-MCNC: 0.63 MG/DL (ref 0.6–1.3)
EOSINOPHIL # BLD AUTO: 0.17 THOUSAND/ÂΜL (ref 0–0.61)
EOSINOPHIL NFR BLD AUTO: 4 % (ref 0–6)
ERYTHROCYTE [DISTWIDTH] IN BLOOD BY AUTOMATED COUNT: 17.8 % (ref 11.6–15.1)
GFR SERPL CREATININE-BSD FRML MDRD: 125 ML/MIN/1.73SQ M
GLUCOSE SERPL-MCNC: 85 MG/DL (ref 65–140)
HCT VFR BLD AUTO: 28.5 % (ref 34.8–46.1)
HGB BLD-MCNC: 8.9 G/DL (ref 11.5–15.4)
IMM GRANULOCYTES # BLD AUTO: 0.01 THOUSAND/UL (ref 0–0.2)
IMM GRANULOCYTES NFR BLD AUTO: 0 % (ref 0–2)
LYMPHOCYTES # BLD AUTO: 1.78 THOUSANDS/ÂΜL (ref 0.6–4.47)
LYMPHOCYTES NFR BLD AUTO: 41 % (ref 14–44)
MCH RBC QN AUTO: 25 PG (ref 26.8–34.3)
MCHC RBC AUTO-ENTMCNC: 31.2 G/DL (ref 31.4–37.4)
MCV RBC AUTO: 80 FL (ref 82–98)
MONOCYTES # BLD AUTO: 0.69 THOUSAND/ÂΜL (ref 0.17–1.22)
MONOCYTES NFR BLD AUTO: 16 % (ref 4–12)
NEUTROPHILS # BLD AUTO: 1.64 THOUSANDS/ÂΜL (ref 1.85–7.62)
NEUTS SEG NFR BLD AUTO: 38 % (ref 43–75)
NRBC BLD AUTO-RTO: 0 /100 WBCS
PLATELET # BLD AUTO: 199 THOUSANDS/UL (ref 149–390)
PMV BLD AUTO: 10.4 FL (ref 8.9–12.7)
POTASSIUM SERPL-SCNC: 3.5 MMOL/L (ref 3.5–5.3)
RBC # BLD AUTO: 3.56 MILLION/UL (ref 3.81–5.12)
SODIUM SERPL-SCNC: 140 MMOL/L (ref 135–147)
WBC # BLD AUTO: 4.32 THOUSAND/UL (ref 4.31–10.16)

## 2024-09-07 PROCEDURE — 85025 COMPLETE CBC W/AUTO DIFF WBC: CPT

## 2024-09-07 PROCEDURE — 80048 BASIC METABOLIC PNL TOTAL CA: CPT

## 2024-09-07 PROCEDURE — 99238 HOSP IP/OBS DSCHRG MGMT 30/<: CPT | Performed by: HOSPITALIST

## 2024-09-07 RX ORDER — FAMOTIDINE 20 MG/1
20 TABLET, FILM COATED ORAL
Qty: 30 TABLET | Refills: 0 | Status: SHIPPED | OUTPATIENT
Start: 2024-09-08 | End: 2024-10-08

## 2024-09-07 RX ORDER — OXYCODONE HYDROCHLORIDE 5 MG/1
5 TABLET ORAL EVERY 6 HOURS PRN
Qty: 10 TABLET | Refills: 0 | Status: SHIPPED | OUTPATIENT
Start: 2024-09-07

## 2024-09-07 RX ORDER — FERROUS SULFATE 325(65) MG
325 TABLET ORAL EVERY OTHER DAY
Qty: 30 TABLET | Refills: 0 | Status: SHIPPED | OUTPATIENT
Start: 2024-09-08

## 2024-09-07 RX ORDER — ONDANSETRON 4 MG/1
4 TABLET, FILM COATED ORAL EVERY 6 HOURS
Qty: 10 TABLET | Refills: 0 | Status: SHIPPED | OUTPATIENT
Start: 2024-09-07 | End: 2024-09-10

## 2024-09-07 RX ORDER — LEVOFLOXACIN 500 MG/1
500 TABLET, FILM COATED ORAL EVERY 24 HOURS
Qty: 12 TABLET | Refills: 0 | Status: SHIPPED | OUTPATIENT
Start: 2024-09-07 | End: 2024-09-19

## 2024-09-07 RX ADMIN — FAMOTIDINE 20 MG: 20 TABLET, FILM COATED ORAL at 06:00

## 2024-09-07 RX ADMIN — SODIUM CHLORIDE, SODIUM GLUCONATE, SODIUM ACETATE, POTASSIUM CHLORIDE, MAGNESIUM CHLORIDE, SODIUM PHOSPHATE, DIBASIC, AND POTASSIUM PHOSPHATE 100 ML/HR: .53; .5; .37; .037; .03; .012; .00082 INJECTION, SOLUTION INTRAVENOUS at 03:51

## 2024-09-07 RX ADMIN — ENOXAPARIN SODIUM 40 MG: 40 INJECTION SUBCUTANEOUS at 09:09

## 2024-09-07 RX ADMIN — OXYCODONE HYDROCHLORIDE 5 MG: 5 TABLET ORAL at 06:00

## 2024-09-07 NOTE — DISCHARGE INSTR - AVS FIRST PAGE
Please see a PCP within 7 to 14 days of discharge. I have provided the number for our [facility] office in [place] ([phone]) if you would like to establish with us. In addition, I have also given you the number for the [facility] office which is closer to your home if he would like to establish care there.  Please finish the total course of your Levaquin antibiotics for total of 12 days do not skip any doses.  Use sunscreen if going outside as you may get skin irritation.  Can take with food.  I am referring you to an OB/GYN as requested  During her hospitalization you are also found to have anemia due to iron deficiency.  I am discharging you on iron supplement to take every other day for best absorption he should follow-up with PCP regarding possible outpatient iron infusions.

## 2024-09-07 NOTE — CASE MANAGEMENT
Case Management Assessment & Discharge Planning Note    Patient name Vannessa Birmingham  Location /-01 MRN [id number]  : 2000 Date 2024       Current Admission Date: 2024  Current Admission Diagnosis:Pyelonephritis   Patient Active Problem List    Diagnosis Date Noted Date Diagnosed    Pyelonephritis 2024     Renal abscess 2024     Anemia 2024     History of nicotine vaping 2024     Hypokalemia 2024     GERD (gastroesophageal reflux disease) 2024     40 weeks gestation of pregnancy 10/29/2018     Normal spontaneous vaginal delivery 10/29/2018     High risk teen pregnancy 2018     Maternal drug use complicating pregnancy in second trimester, antepartum 2018     Limited prenatal care 2018       LOS (days): 2  Geometric Mean LOS (GMLOS) (days): 2.9  Days to GMLOS:0.6     OBJECTIVE:    Risk of Unplanned Readmission Score: 14.4         Current admission status: Inpatient       Preferred Pharmacy:   [id number] - [place], [place] - [address]  [address] [address]  Phone: [phone] Fax: [phone]    Primary Care Provider: No primary care provider on file.    Primary Insurance: CAPITAL  Secondary Insurance:     ASSESSMENT:  Active Health Care Proxies    There are no active Health Care Proxies on file.                                                      Social Determinants of Health (SDOH)      Flowsheet Row Most Recent Value   Housing Stability    In the last 12 months, was there a time when you were not able to pay the mortgage or rent on time? N   In the past 12 months, how many times have you moved where you were living? 0   At any time in the past 12 months, were you homeless or living in a shelter (including now)? N   Transportation Needs    In the past 12 months, has lack of transportation kept you from medical appointments or from getting medications? no   In the past 12 months,  has lack of transportation kept you from meetings, work, or from getting things needed for daily living? No   Food Insecurity    Within the past 12 months, you worried that your food would run out before you got the money to buy more. Never true   Within the past 12 months, the food you bought just didn't last and you didn't have money to get more. Never true   Utilities    In the past 12 months has the electric, gas, oil, or water company threatened to shut off services in your home? No            DISCHARGE DETAILS:    Discharge planning discussed with:: [facility] rec'd epic SC from medical team. per medical team, pt is medically cleared for discharged. Pt to discharged home with no need pending transportation confirmed. [facility] TC pt several times, no response to confirm transport.      Per RN, family to transport.

## 2024-09-07 NOTE — ASSESSMENT & PLAN NOTE
Nexplanon placed 2018. Has since had breakthrough bleeding. Requesting to be referred to obgyn on dc.

## 2024-09-09 NOTE — UTILIZATION REVIEW
NOTIFICATION OF ADMISSION DISCHARGE   This is a Notification of Discharge from [facility]. Please be advised that this patient has been discharge from our facility. Below you will find the admission and discharge date and time including the patient’s disposition.   UTILIZATION REVIEW CONTACT:  Anthony Corea  Utilization   Network Utilization Review Department  Phone: [phone] x carefully listen to the prompts. All voicemails are confidential.  Email: [email or web address]     ADMISSION INFORMATION  PRESENTATION DATE: 9/4/2024 10:14 PM  OBERVATION ADMISSION DATE: N/A  INPATIENT ADMISSION DATE: 9/5/24  1:37 AM   DISCHARGE DATE: 9/7/2024 10:51 AM   DISPOSITION:Home/Self Care    Network Utilization Review Department  ATTENTION: Please call with any questions or concerns to [phone] and carefully listen to the prompts so that you are directed to the right person. All voicemails are confidential.   For Discharge needs, contact Care Management DC Support Team at [phone] opt. 2  Send all requests for admission clinical reviews, approved or denied determinations and any other requests to dedicated fax number below belonging to the campus where the patient is receiving treatment. List of dedicated fax numbers for the Facilities:  FACILITY NAME UR FAX NUMBER   ADMISSION DENIALS (Administrative/Medical Necessity) [phone]   DISCHARGE SUPPORT TEAM ([facility]) [phone]   PARENT CHILD HEALTH (Maternity/NICU/Pediatrics) [phone]   [facility] [phone]   [facility] [phone]   [facility] [phone]   [facility] [phone]   [facility] [phone]   [facility] [phone]   [facility] [phone]   [facility] [phone]   [facility]  [facility] [phone]   [facility] [phone]   [facility] [phone]   [facility] [phone]

## 2024-09-10 LAB
BACTERIA BLD CULT: NORMAL
BACTERIA BLD CULT: NORMAL

## 2024-10-07 PROBLEM — N12 PYELONEPHRITIS: Status: RESOLVED | Noted: 2024-09-05 | Resolved: 2024-10-07

## 2024-12-31 PROBLEM — O09.899 HIGH RISK TEEN PREGNANCY: Status: RESOLVED | Noted: 2018-06-19 | Resolved: 2024-12-31

## 2024-12-31 PROBLEM — O09.30 LIMITED PRENATAL CARE: Status: RESOLVED | Noted: 2018-06-07 | Resolved: 2024-12-31

## 2024-12-31 PROBLEM — Z3A.40 40 WEEKS GESTATION OF PREGNANCY: Status: RESOLVED | Noted: 2018-10-29 | Resolved: 2024-12-31

## 2024-12-31 PROBLEM — O99.322 MATERNAL DRUG USE COMPLICATING PREGNANCY IN SECOND TRIMESTER, ANTEPARTUM: Status: RESOLVED | Noted: 2018-06-14 | Resolved: 2024-12-31

## 2025-01-02 ENCOUNTER — OFFICE VISIT (OUTPATIENT)
Dept: OBGYN CLINIC | Facility: CLINIC | Age: 25
End: 2025-01-02
Payer: COMMERCIAL

## 2025-01-02 VITALS
HEIGHT: 63 IN | WEIGHT: 102 LBS | DIASTOLIC BLOOD PRESSURE: 78 MMHG | SYSTOLIC BLOOD PRESSURE: 122 MMHG | BODY MASS INDEX: 18.07 KG/M2

## 2025-01-02 DIAGNOSIS — Z01.419 WELL FEMALE EXAM WITH ROUTINE GYNECOLOGICAL EXAM: Primary | ICD-10-CM

## 2025-01-02 DIAGNOSIS — Z97.5 NEXPLANON IN PLACE: ICD-10-CM

## 2025-01-02 DIAGNOSIS — Z12.4 SCREENING FOR CERVICAL CANCER: ICD-10-CM

## 2025-01-02 PROCEDURE — G0145 SCR C/V CYTO,THINLAYER,RESCR: HCPCS | Performed by: PHYSICIAN ASSISTANT

## 2025-01-02 PROCEDURE — S0610 ANNUAL GYNECOLOGICAL EXAMINA: HCPCS | Performed by: PHYSICIAN ASSISTANT

## 2025-01-02 NOTE — PATIENT INSTRUCTIONS
Patient Information about GARDASIL®9 (pronounced “kngy-Ca-iibs n?n”)  (Human Papillomavirus 9-valent Vaccine, Recombinant)    What is GARDASIL 9?  GARDASIL 9 is a vaccine (injection/shot) given to individuals 9 through 45 years of age to help protect  against diseases caused by some types of Human Papillomavirus (HPV).    What diseases can GARDASIL 9 help protect against?  In girls and women 9 through 45 years of age, GARDASIL 9 helps protect against:   Cervical cancer   Vulvar and vaginal cancers   Anal cancer   Certain head and neck cancers, such as throat and back of mouth cancers   Precancerous cervical, vulvar, vaginal and anal lesions   Genital warts    In boys and men 9 through 45 years of age, GARDASIL 9 helps protect against:   Anal cancer   Certain head and neck cancers, such as throat and back of mouth cancers   Precancerous anal lesions   Genital warts    These diseases may have many causes, including HPV Types 6, 11, 16, 18, 31, 33, 45, 52, and 58.  GARDASIL 9 only protects against diseases caused by these nine types of HPV.  People cannot get HPV or any of these diseases from GARDASIL 9.  What important information about GARDASIL 9 should I know?  GARDASIL 9:   Does not remove the need for screening for cervical, vulvar, vaginal, anal, and certain head and  neck cancers, such as throat and back of mouth cancers as recommended by a health care provider.     CDC recommends that 11- to 41-[id number]-[id number] receive two doses of HPV vaccine 6 to 12 months apart.    The first dose is routinely recommended at ages 11-12 years old. The vaccination can be started at age 9 years.  Only two doses are needed if the first dose was given before 15th birthday.  Teens and young adults who start the series later, at ages 15 through 26 years, need three doses of HPV vaccine.    Children aged 9 through 14 years who have received two doses of HPV vaccine less than 5 months apart will need a third dose.  Three doses are also  recommended for people aged 9 through 26 years who have weakened immune systems.  Vaccination is not recommended for everyone older than age 26 years.    Some adults age 27 through 45 years who are not already vaccinated may decide to get HPV vaccine after speaking with their doctor about their risk for new HPV infections and the possible benefits of vaccination for them.  HPV vaccination in this age range provides less benefit, because more people in this age range have already been exposed to HPV.    What are the possible side effects of GARDASIL 9?  The most common side effects seen with GARDASIL 9 are:   pain, swelling, redness, itching, bruising, bleeding, and a lump where you got the shot   headache   fever   nausea   dizziness   tiredness   diarrhea   abdominal pain

## 2025-01-02 NOTE — PROGRESS NOTES
Patient here for ROUTINE GYN EXAM.    Patient is 24 y.o. year old female G 1P 1.  She is here today for her routine gyn exam. She is using nexplanon for birth control.  Patient's last menstrual period was 12/02/2024 (approximate).. Menses irregular with nexlpanon.  Pt does  smoke.  She denies alcohol/drug abuse.  She denies domestic violence/abuse.   She denies problems with her breasts, bladder, or bowel.      Nexplanon overdue for removal inserted 2018 per patient.      Reviewed the USPSTF guidelines recommending gonorrhea and chlamydia testing for all sexually active women 24 years old or younger.   Patient declines gonorrhea/ chlamydia testing.   Patient declines STD blood work including HIV.      She is unsure if she completed the gardasil vaccine series.     Pt with no history of abnormal pap smears.  We discussed the current ACOG pap guidelines. Last pap was 1/8/20 and results were negative.    Domestic violence screen: negative    She has no complaints today.     Family History breast cancer:  no  Family History ovarian cancer: no  Family History colon cancer: no      Review of Systems   Constitutional: Negative.    HENT: Negative.    Eyes: Negative.    Respiratory: Negative.    Cardiovascular: Negative.    Gastrointestinal: Negative.    Endocrine: Negative.    Genitourinary:        As noted in HPI   Musculoskeletal: Negative.    Skin: Negative.    Allergic/Immunologic: Negative.    Neurological: Negative.    Hematological: Negative.    Psychiatric/Behavioral: Negative      Current Outpatient Medications on File Prior to Visit   Medication Sig Dispense Refill    ferrous sulfate 325 (65 Fe) mg tablet Take 1 tablet (325 mg total) by mouth every other day 30 tablet 0    famotidine (PEPCID) 20 mg tablet Take 1 tablet (20 mg total) by mouth daily before breakfast 30 tablet 0    ondansetron (ZOFRAN) 4 mg tablet Take 1 tablet (4 mg total) by mouth every 6 (six) hours for 3 days 10 tablet 0    oxyCODONE (ROXICODONE)  "5 immediate release tablet Take 1 tablet (5 mg total) by mouth every 6 (six) hours as needed for severe pain for up to 10 doses Max Daily Amount: 20 mg 10 tablet 0     No current facility-administered medications on file prior to visit.       No Known Allergies    History reviewed. No pertinent surgical history.        Vitals:    01/02/25 1336   BP: 122/78   BP Location: Right arm   Patient Position: Sitting   Cuff Size: Standard   Weight: 46.3 kg (102 lb)   Height: 5' 3\" (1.6 m)       Chaperone was present during exam.    EXAM:    Neck: supple without nodes or thyromegaly  Heart: regular rate and rhythm  Lungs: clear to auscultation without rales, rhonci  Breasts: nontender, no masses, no discoloration, no discharge  Abdomen: soft, nontender, no masses  Ext genitalia: no lesions, no discoloration  Urethra: no discharge or erythema  Bladder: nontender, good support  Vagina: no discharge, no lesions  Cervix: no lesions, no cervical motion tenderness  Adnexa: nontender, no masses  Uterus: nontender, normal size and shape    Assessment & Plan  Well female exam with routine gynecological exam   pap done today.  gardasil : patient aware of recommendation for vaccine, will confirm she completed  Scheduled nexplanon removal.  Patient wants to stay off birth control for a little while and see if menses regulate.  She is aware PNV recommended if not preventing pregnancy       Nexplanon in place    Orders:    Ambulatory Referral to Obstetrics / Gynecology    Screening for cervical cancer    Orders:    Liquid-based pap, screening            "

## 2025-01-06 NOTE — PROGRESS NOTES
"Pt requesting nexplanon removal    DESCRIPTION OF PROCEDURE:   The patient's left arm was flexed and externally rotated so that her wrist was parallel to her ear, exposing the inner biceps. The subdermal david was easily palpated. The area was cleansed with betadine. 2cc of 1% lidocaine was injected directly inferior to the distal end of the david. Gloves were changed to sterile. A 2mm stab incision was made with the scalpel. The distal end of the david was pushed through the incision and grasped with a hemostat. The david was removed in its entirety.   A steri-strip was applied for closure. A pressure dressing with gauze was applied to minimize bruising. Patient tolerated procedure well.     Universal Protocol:  procedure performed by consultantConsent: Verbal consent obtained. Written consent obtained.  Risks and benefits: risks, benefits and alternatives were discussed  Consent given by: patient  Time out: Immediately prior to procedure a \"time out\" was called to verify the correct patient, procedure, equipment, support staff and site/side marked as required.  Patient understanding: patient states understanding of the procedure being performed  Patient consent: the patient's understanding of the procedure matches consent given  Procedure consent: procedure consent matches procedure scheduled  Patient identity confirmed: verbally with patient  Remove and insert drug implant    Date/Time: 1/9/2025 1:00 PM    Performed by: Adrian Coello PA-C  Authorized by: Adrian Coello PA-C    Indication:     Indication: Presence of non-biodegradable drug delivery implant    Pre-procedure:     Pre-procedure timeout performed: yes      Prepped with: povidone-iodine      Local anesthetic:  Lidocaine 1%    The site was cleaned and prepped in a sterile fashion: yes    Procedure:     Procedure:  Removal    Small stab incision was made in arm: yes      Left/right:  Left    Site was closed with steri-strips and pressure bandage applied: yes  "       Assessment & Plan  Nexplanon removal       Precautions reviewed.  condoms for birth control  Patient plans to track menses and will call if menses do not regulate by 3 months from now.

## 2025-01-08 ENCOUNTER — TELEPHONE (OUTPATIENT)
Dept: FAMILY MEDICINE CLINIC | Facility: CLINIC | Age: 25
End: 2025-01-08

## 2025-01-08 NOTE — TELEPHONE ENCOUNTER
Hello!    I see this patient has an ASAP referral in the system to establish care after being seen in the ER, can anyone assist?    Thank you!  Dori St

## 2025-01-09 ENCOUNTER — RESULTS FOLLOW-UP (OUTPATIENT)
Age: 25
End: 2025-01-09

## 2025-01-09 ENCOUNTER — PROCEDURE VISIT (OUTPATIENT)
Age: 25
End: 2025-01-09
Payer: COMMERCIAL

## 2025-01-09 VITALS
BODY MASS INDEX: 17.47 KG/M2 | HEIGHT: 63 IN | SYSTOLIC BLOOD PRESSURE: 114 MMHG | WEIGHT: 98.6 LBS | DIASTOLIC BLOOD PRESSURE: 62 MMHG

## 2025-01-09 DIAGNOSIS — Z30.46 NEXPLANON REMOVAL: Primary | ICD-10-CM

## 2025-01-09 LAB
LAB AP GYN PRIMARY INTERPRETATION: NORMAL
Lab: NORMAL

## 2025-01-09 PROCEDURE — 11982 REMOVE DRUG IMPLANT DEVICE: CPT | Performed by: PHYSICIAN ASSISTANT

## 2025-02-23 ENCOUNTER — APPOINTMENT (EMERGENCY)
Dept: RADIOLOGY | Facility: HOSPITAL | Age: 25
End: 2025-02-23
Payer: COMMERCIAL

## 2025-02-23 ENCOUNTER — HOSPITAL ENCOUNTER (EMERGENCY)
Facility: HOSPITAL | Age: 25
Discharge: HOME | End: 2025-02-23
Attending: EMERGENCY MEDICINE | Admitting: EMERGENCY MEDICINE
Payer: COMMERCIAL

## 2025-02-23 VITALS
BODY MASS INDEX: 17.36 KG/M2 | RESPIRATION RATE: 18 BRPM | HEART RATE: 74 BPM | SYSTOLIC BLOOD PRESSURE: 92 MMHG | TEMPERATURE: 98.9 F | WEIGHT: 98 LBS | DIASTOLIC BLOOD PRESSURE: 51 MMHG | OXYGEN SATURATION: 98 %

## 2025-02-23 DIAGNOSIS — R11.2 NAUSEA AND VOMITING: Primary | ICD-10-CM

## 2025-02-23 DIAGNOSIS — R10.9 ABDOMINAL PAIN: ICD-10-CM

## 2025-02-23 LAB
ALBUMIN SERPL BCG-MCNC: 5.3 G/DL (ref 3.5–5)
ALP SERPL-CCNC: 46 U/L (ref 34–104)
ALT SERPL W P-5'-P-CCNC: 9 U/L (ref 7–52)
ANION GAP SERPL CALCULATED.3IONS-SCNC: 16 MMOL/L (ref 4–13)
AST SERPL W P-5'-P-CCNC: 16 U/L (ref 13–39)
BASOPHILS # BLD AUTO: 0.05 THOUSANDS/ÂΜL (ref 0–0.1)
BASOPHILS NFR BLD AUTO: 0 % (ref 0–1)
BILIRUB SERPL-MCNC: 1.43 MG/DL (ref 0.2–1)
BILIRUB UR QL STRIP: ABNORMAL
BUN SERPL-MCNC: 15 MG/DL (ref 5–25)
CALCIUM SERPL-MCNC: 10.3 MG/DL (ref 8.4–10.2)
CARDIAC TROPONIN I PNL SERPL HS: <2 NG/L (ref ?–50)
CARDIAC TROPONIN I PNL SERPL HS: <2 NG/L (ref ?–50)
CHLORIDE SERPL-SCNC: 103 MMOL/L (ref 96–108)
CLARITY UR: ABNORMAL
CO2 SERPL-SCNC: 18 MMOL/L (ref 21–32)
COLOR UR: YELLOW
CREAT SERPL-MCNC: 0.83 MG/DL (ref 0.6–1.3)
EOSINOPHIL # BLD AUTO: 0.06 THOUSAND/ÂΜL (ref 0–0.61)
EOSINOPHIL NFR BLD AUTO: 0 % (ref 0–6)
ERYTHROCYTE [DISTWIDTH] IN BLOOD BY AUTOMATED COUNT: 15.7 % (ref 11.6–15.1)
GFR SERPL CREATININE-BSD FRML MDRD: 99 ML/MIN/1.73SQ M
GLUCOSE SERPL-MCNC: 118 MG/DL (ref 65–140)
GLUCOSE UR STRIP-MCNC: NEGATIVE MG/DL
HCG SERPL QL: NEGATIVE
HCT VFR BLD AUTO: 43.4 % (ref 34.8–46.1)
HGB BLD-MCNC: 14.1 G/DL (ref 11.5–15.4)
HGB UR QL STRIP.AUTO: NEGATIVE
IMM GRANULOCYTES # BLD AUTO: 0.06 THOUSAND/UL (ref 0–0.2)
IMM GRANULOCYTES NFR BLD AUTO: 0 % (ref 0–2)
KETONES UR STRIP-MCNC: ABNORMAL MG/DL
LEUKOCYTE ESTERASE UR QL STRIP: NEGATIVE
LIPASE SERPL-CCNC: 18 U/L (ref 11–82)
LYMPHOCYTES # BLD AUTO: 1.11 THOUSANDS/ÂΜL (ref 0.6–4.47)
LYMPHOCYTES NFR BLD AUTO: 7 % (ref 14–44)
MCH RBC QN AUTO: 28.4 PG (ref 26.8–34.3)
MCHC RBC AUTO-ENTMCNC: 32.5 G/DL (ref 31.4–37.4)
MCV RBC AUTO: 87 FL (ref 82–98)
MONOCYTES # BLD AUTO: 0.91 THOUSAND/ÂΜL (ref 0.17–1.22)
MONOCYTES NFR BLD AUTO: 6 % (ref 4–12)
NEUTROPHILS # BLD AUTO: 13.71 THOUSANDS/ÂΜL (ref 1.85–7.62)
NEUTS SEG NFR BLD AUTO: 87 % (ref 43–75)
NITRITE UR QL STRIP: NEGATIVE
NRBC BLD AUTO-RTO: 0 /100 WBCS
PH UR STRIP.AUTO: 6 [PH]
PLATELET # BLD AUTO: 378 THOUSANDS/UL (ref 149–390)
PMV BLD AUTO: 10.7 FL (ref 8.9–12.7)
POTASSIUM SERPL-SCNC: 3.5 MMOL/L (ref 3.5–5.3)
PROT SERPL-MCNC: 8.7 G/DL (ref 6.4–8.4)
PROT UR STRIP-MCNC: NEGATIVE MG/DL
RBC # BLD AUTO: 4.97 MILLION/UL (ref 3.81–5.12)
SODIUM SERPL-SCNC: 137 MMOL/L (ref 135–147)
SP GR UR STRIP.AUTO: >=1.03
UROBILINOGEN UR QL STRIP.AUTO: 0.2 E.U./DL
WBC # BLD AUTO: 15.9 THOUSAND/UL (ref 4.31–10.16)

## 2025-02-23 PROCEDURE — 99285 EMERGENCY DEPT VISIT HI MDM: CPT | Performed by: EMERGENCY MEDICINE

## 2025-02-23 PROCEDURE — 71045 X-RAY EXAM CHEST 1 VIEW: CPT

## 2025-02-23 PROCEDURE — 96374 THER/PROPH/DIAG INJ IV PUSH: CPT

## 2025-02-23 PROCEDURE — 83690 ASSAY OF LIPASE: CPT

## 2025-02-23 PROCEDURE — 81003 URINALYSIS AUTO W/O SCOPE: CPT | Performed by: EMERGENCY MEDICINE

## 2025-02-23 PROCEDURE — 85025 COMPLETE CBC W/AUTO DIFF WBC: CPT

## 2025-02-23 PROCEDURE — 36415 COLL VENOUS BLD VENIPUNCTURE: CPT

## 2025-02-23 PROCEDURE — 84484 ASSAY OF TROPONIN QUANT: CPT

## 2025-02-23 PROCEDURE — 84703 CHORIONIC GONADOTROPIN ASSAY: CPT

## 2025-02-23 PROCEDURE — 93005 ELECTROCARDIOGRAM TRACING: CPT

## 2025-02-23 PROCEDURE — 96375 TX/PRO/DX INJ NEW DRUG ADDON: CPT

## 2025-02-23 PROCEDURE — 80053 COMPREHEN METABOLIC PANEL: CPT

## 2025-02-23 PROCEDURE — 99284 EMERGENCY DEPT VISIT MOD MDM: CPT

## 2025-02-23 RX ORDER — ONDANSETRON 2 MG/ML
4 INJECTION INTRAMUSCULAR; INTRAVENOUS ONCE
Status: COMPLETED | OUTPATIENT
Start: 2025-02-23 | End: 2025-02-23

## 2025-02-23 RX ORDER — DROPERIDOL 2.5 MG/ML
0.62 INJECTION, SOLUTION INTRAMUSCULAR; INTRAVENOUS ONCE
Status: COMPLETED | OUTPATIENT
Start: 2025-02-23 | End: 2025-02-23

## 2025-02-23 RX ORDER — KETOROLAC TROMETHAMINE 30 MG/ML
15 INJECTION, SOLUTION INTRAMUSCULAR; INTRAVENOUS ONCE
Status: COMPLETED | OUTPATIENT
Start: 2025-02-23 | End: 2025-02-23

## 2025-02-23 RX ORDER — ONDANSETRON 4 MG/1
4 TABLET, FILM COATED ORAL EVERY 6 HOURS
Qty: 12 TABLET | Refills: 0 | Status: SHIPPED | OUTPATIENT
Start: 2025-02-23

## 2025-02-23 RX ADMIN — Medication 670 MG: at 17:16

## 2025-02-23 RX ADMIN — ONDANSETRON 4 MG: 2 INJECTION INTRAMUSCULAR; INTRAVENOUS at 16:30

## 2025-02-23 RX ADMIN — KETOROLAC TROMETHAMINE 15 MG: 30 INJECTION, SOLUTION INTRAMUSCULAR at 17:14

## 2025-02-23 RX ADMIN — DROPERIDOL 0.62 MG: 2.5 INJECTION, SOLUTION INTRAMUSCULAR; INTRAVENOUS at 17:12

## 2025-02-23 RX ADMIN — SODIUM CHLORIDE 1000 ML: 0.9 INJECTION, SOLUTION INTRAVENOUS at 16:30

## 2025-02-23 NOTE — Clinical Note
Vannessa Birmingham was seen and treated in our emergency department on 2/23/2025.                Diagnosis:     Vannessa  is off the rest of the shift today, may return to work on return date, may return to school on return date.    She may return on this date: 02/24/2025         If you have any questions or concerns, please don't hesitate to call.      Bradley Roberts MD    ______________________________           _______________          _______________  Hospital Representative                              Date                                Time

## 2025-02-23 NOTE — DISCHARGE INSTRUCTIONS
You were evaluated in the emergency department for vomiting, diarrhea, abdominal cramping.  Your evaluation is showing no emergent cause of your symptoms requiring surgery or hospitalization.  It is likely your symptoms are due to a viral infection, called gastroenteritis.  For this, take Tylenol and Motrin for chills and discomfort, and take Zofran as needed for nausea and vomiting.  You may additionally take Imodium as needed for diarrhea.  Follow-up with your primary care provider and return to the emergency department if your symptoms severely worsen

## 2025-02-24 LAB
ATRIAL RATE: 75 BPM
ATRIAL RATE: 86 BPM
P AXIS: 29 DEGREES
P AXIS: 56 DEGREES
PR INTERVAL: 118 MS
PR INTERVAL: 162 MS
QRS AXIS: 51 DEGREES
QRS AXIS: 66 DEGREES
QRSD INTERVAL: 82 MS
QRSD INTERVAL: 84 MS
QT INTERVAL: 376 MS
QT INTERVAL: 408 MS
QTC INTERVAL: 449 MS
QTC INTERVAL: 455 MS
T WAVE AXIS: 52 DEGREES
T WAVE AXIS: 55 DEGREES
VENTRICULAR RATE: 75 BPM
VENTRICULAR RATE: 86 BPM

## 2025-02-24 PROCEDURE — 93010 ELECTROCARDIOGRAM REPORT: CPT | Performed by: INTERNAL MEDICINE

## 2025-02-24 NOTE — ED PROVIDER NOTES
Time reflects when diagnosis was documented in both MDM as applicable and the Disposition within this note       Time User Action Codes Description Comment    2/23/2025  6:33 PM Bradley Roberts Add [R11.2] Nausea and vomiting     2/23/2025  6:33 PM Bradley Roberts Add [R10.9] Abdominal pain           ED Disposition       ED Disposition   Discharge    Condition   Stable    Date/Time   Sun Feb 23, 2025  6:33 PM    Comment   Vannessa Birmingham discharge to home/self care.                   Assessment & Plan       Medical Decision Making  Patient's presentation most consistent with gastroenteritis, will treat symptomatically, provide fluid bolus, will evaluate with CBC, CMP, urinalysis, U. Preg, chest x-ray, troponin, EKG to evaluate for wide differential including but not limited to myositis, pericarditis, ectopic, hepatitis.      Amount and/or Complexity of Data Reviewed  Labs: ordered.  Radiology: ordered.    Risk  Prescription drug management.             Medications   acetaminophen (Ofirmev) IVPB 670 mg (0 mg Intravenous Stopped 2/23/25 1731)   ondansetron (ZOFRAN) injection 4 mg (4 mg Intravenous Given 2/23/25 1630)   ketorolac (TORADOL) injection 15 mg (15 mg Intravenous Given 2/23/25 1714)   sodium chloride 0.9 % bolus 1,000 mL (0 mL Intravenous Stopped 2/23/25 1630)   droperidol (INAPSINE) injection 0.625 mg (0.625 mg Intravenous Given 2/23/25 1712)       ED Risk Strat Scores                            SBIRT 22yo+      Flowsheet Row Most Recent Value   Initial Alcohol Screen: US AUDIT-C     1. How often do you have a drink containing alcohol? 0 Filed at: 02/23/2025 1617   2. How many drinks containing alcohol do you have on a typical day you are drinking?  0 Filed at: 02/23/2025 1617   3a. Male UNDER 65: How often do you have five or more drinks on one occasion? 0 Filed at: 02/23/2025 1617   3b. FEMALE Any Age, or MALE 65+: How often do you have 4 or more drinks on one occassion? 0 Filed at: 02/23/2025 1617    Audit-C Score 0 Filed at: 2025 1617   KARISSA: How many times in the past year have you...    Used an illegal drug or used a prescription medication for non-medical reasons? Never Filed at: 2025 1617                            History of Present Illness       Chief Complaint   Patient presents with    Abdominal Pain       Past Medical History:   Diagnosis Date     2017    Anemia       Past Surgical History:   Procedure Laterality Date    WISDOM TOOTH EXTRACTION        Family History   Problem Relation Age of Onset    Heart attack Father       Social History     Tobacco Use    Smoking status: Every Day     Current packs/day: 0.00     Average packs/day: 0.5 packs/day for 5.0 years (2.5 ttl pk-yrs)     Types: Cigarettes     Last attempt to quit:      Years since quitting: 3.1    Smokeless tobacco: Never    Tobacco comments:     Vape   Vaping Use    Vaping status: Some Days    Substances: Nicotine   Substance Use Topics    Alcohol use: No     Comment: rare    Drug use: Not Currently     Types: Marijuana     Comment: last use       E-Cigarette/Vaping    E-Cigarette Use Current Some Day User       E-Cigarette/Vaping Substances    Nicotine Yes       I have reviewed and agree with the history as documented.     Patient is a 24-year-old female without significant past medical history presenting for evaluation of vomiting, abdominal cramping, diarrhea that began at approximately 9 AM this morning.  Additionally complaining of chills and myalgias.  On review of systems, patient complaining of chest pain that started a couple hours ago.  Not pleuritic, diffuse across her chest.  No fevers, chest pain, shortness of breath, or other complaints on review of systems.        Review of Systems   All other systems reviewed and are negative.          Objective       ED Triage Vitals   Temperature Pulse Blood Pressure Respirations SpO2 Patient Position - Orthostatic VS   25 1617 25 1617 25  1617 02/23/25 1617 02/23/25 1617 --   98.9 °F (37.2 °C) 98 127/72 18 98 %       Temp Source Heart Rate Source BP Location FiO2 (%) Pain Score    02/23/25 1617 02/23/25 1617 02/23/25 1617 -- 02/23/25 1714    Temporal Monitor Left arm  6      Vitals      Date and Time Temp Pulse SpO2 Resp BP Pain Score FACES Pain Rating User   02/23/25 1830 -- 74 98 % 18 92/51 -- -- RTM   02/23/25 1800 -- 72 98 % 16 99/57 -- -- RTM   02/23/25 1730 -- 57 96 % 16 101/59 -- -- RTM   02/23/25 1714 -- 62 100 % 17 109/70 6 -- RTM   02/23/25 1645 -- 82 95 % 25 115/59 -- -- RTM   02/23/25 1617 98.9 °F (37.2 °C) 98 98 % 18 127/72 -- -- DK            Physical Exam  Vitals and nursing note reviewed.   Constitutional:       General: She is not in acute distress.     Appearance: She is well-developed.   HENT:      Head: Normocephalic and atraumatic.   Eyes:      Conjunctiva/sclera: Conjunctivae normal.   Cardiovascular:      Rate and Rhythm: Normal rate and regular rhythm.      Heart sounds: No murmur heard.  Pulmonary:      Effort: Pulmonary effort is normal. No respiratory distress.      Breath sounds: Normal breath sounds.   Abdominal:      Palpations: Abdomen is soft.      Tenderness: There is generalized abdominal tenderness. There is no right CVA tenderness, left CVA tenderness, guarding or rebound.   Musculoskeletal:         General: No swelling.      Cervical back: Neck supple.   Skin:     General: Skin is warm and dry.      Capillary Refill: Capillary refill takes less than 2 seconds.   Neurological:      Mental Status: She is alert.   Psychiatric:         Mood and Affect: Mood normal.     Patient is a 24-year-old female without significant past medical history presenting for evaluation    Results Reviewed       Procedure Component Value Units Date/Time    UA w Reflex to Microscopic w Reflex to Culture [[id number]]  (Abnormal) Collected: 02/23/25 1900    Lab Status: Final result Specimen: Urine, Clean Catch Updated: 02/23/25 1908      Color, UA Yellow     Clarity, UA Hazy     Specific Gravity, UA >=1.030     pH, UA 6.0     Leukocytes, UA Negative     Nitrite, UA Negative     Protein, UA Negative mg/dl      Glucose, UA Negative mg/dl      Ketones, UA 80 (3+) mg/dl      Urobilinogen, UA 0.2 E.U./dl      Bilirubin, UA 1+     Occult Blood, UA Negative    HS Troponin I 2hr [[id number]] Collected: 02/23/25 1828    Lab Status: Final result Specimen: Blood from Arm, Right Updated: 02/23/25 1900     hs TnI 2hr <2 ng/L      Delta 2hr hsTnI --    HS Troponin I 4hr [[id number]]     Lab Status: No result Specimen: Blood     hCG, qualitative pregnancy [[id number]]  (Normal) Collected: 02/23/25 1631    Lab Status: Final result Specimen: Blood from Arm, Right Updated: 02/23/25 1705     Preg, Serum Negative    HS Troponin 0hr (reflex protocol) [[id number]]  (Normal) Collected: 02/23/25 1631    Lab Status: Final result Specimen: Blood from Arm, Right Updated: 02/23/25 1704     hs TnI 0hr <2 ng/L     Comprehensive metabolic panel [[id number]]  (Abnormal) Collected: 02/23/25 1631    Lab Status: Final result Specimen: Blood from Arm, Right Updated: 02/23/25 1657     Sodium 137 mmol/L      Potassium 3.5 mmol/L      Chloride 103 mmol/L      CO2 18 mmol/L      ANION GAP 16 mmol/L      BUN 15 mg/dL      Creatinine 0.83 mg/dL      Glucose 118 mg/dL      Calcium 10.3 mg/dL      AST 16 U/L      ALT 9 U/L      Alkaline Phosphatase 46 U/L      Total Protein 8.7 g/dL      Albumin 5.3 g/dL      Total Bilirubin 1.43 mg/dL      eGFR 99 ml/min/1.73sq m     Narrative:      National Kidney Disease Foundation guidelines for Chronic Kidney Disease (CKD):     Stage 1 with normal or high GFR (GFR > 90 mL/min/1.73 square meters)    Stage 2 Mild CKD (GFR = 60-89 mL/min/1.73 square meters)    Stage 3A Moderate CKD (GFR = 45-59 mL/min/1.73 square meters)    Stage 3B Moderate CKD (GFR = 30-44 mL/min/1.73 square meters)    Stage 4 Severe CKD (GFR = 15-29 mL/min/1.73 square meters)    Stage 5 End  Stage CKD (GFR <15 mL/min/1.73 square meters)  Note: GFR calculation is accurate only with a steady state creatinine    Lipase [[id number]]  (Normal) Collected: 02/23/25 1631    Lab Status: Final result Specimen: Blood from Arm, Right Updated: 02/23/25 1657     Lipase 18 u/L     CBC and differential [[id number]]  (Abnormal) Collected: 02/23/25 1631    Lab Status: Final result Specimen: Blood from Arm, Right Updated: 02/23/25 1642     WBC 15.90 Thousand/uL      RBC 4.97 Million/uL      Hemoglobin 14.1 g/dL      Hematocrit 43.4 %      MCV 87 fL      MCH 28.4 pg      MCHC 32.5 g/dL      RDW 15.7 %      MPV 10.7 fL      Platelets 378 Thousands/uL      nRBC 0 /100 WBCs      Segmented % 87 %      Immature Grans % 0 %      Lymphocytes % 7 %      Monocytes % 6 %      Eosinophils Relative 0 %      Basophils Relative 0 %      Absolute Neutrophils 13.71 Thousands/µL      Absolute Immature Grans 0.06 Thousand/uL      Absolute Lymphocytes 1.11 Thousands/µL      Absolute Monocytes 0.91 Thousand/µL      Eosinophils Absolute 0.06 Thousand/µL      Basophils Absolute 0.05 Thousands/µL             XR chest portable    (Results Pending)       Procedures    ED Medication and Procedure Management   Prior to Admission Medications   Prescriptions Last Dose Informant Patient Reported? Taking?   ferrous sulfate 325 (65 Fe) mg tablet   No No   Sig: Take 1 tablet (325 mg total) by mouth every other day      Facility-Administered Medications: None     Discharge Medication List as of 2/23/2025  6:37 PM        START taking these medications    Details   ondansetron (ZOFRAN) 4 mg tablet Take 1 tablet (4 mg total) by mouth every 6 (six) hours, Starting Sun 2/23/2025, Normal           CONTINUE these medications which have NOT CHANGED    Details   ferrous sulfate 325 (65 Fe) mg tablet Take 1 tablet (325 mg total) by mouth every other day, Starting Sun 9/8/2024, Normal           No discharge procedures on file.  ED SEPSIS DOCUMENTATION   Time reflects  when diagnosis was documented in both MDM as applicable and the Disposition within this note       Time User Action Codes Description Comment    2/23/2025  6:33 PM Bradley Roberts Add [R11.2] Nausea and vomiting     2/23/2025  6:33 PM Bradley Roberts Add [R10.9] Abdominal pain                  Bradley Roberts MD  02/23/25 2054

## 2025-04-01 ENCOUNTER — HOSPITAL ENCOUNTER (EMERGENCY)
Facility: HOSPITAL | Age: 25
Discharge: HOME | End: 2025-04-01
Attending: EMERGENCY MEDICINE
Payer: COMMERCIAL

## 2025-04-01 VITALS
RESPIRATION RATE: 18 BRPM | SYSTOLIC BLOOD PRESSURE: 101 MMHG | TEMPERATURE: 98.2 F | HEART RATE: 63 BPM | OXYGEN SATURATION: 99 % | DIASTOLIC BLOOD PRESSURE: 84 MMHG | BODY MASS INDEX: 19.14 KG/M2 | WEIGHT: 108.03 LBS

## 2025-04-01 DIAGNOSIS — Z34.90 PREGNANCY: Primary | ICD-10-CM

## 2025-04-01 DIAGNOSIS — E87.6 HYPOKALEMIA: ICD-10-CM

## 2025-04-01 DIAGNOSIS — R11.10 VOMITING: ICD-10-CM

## 2025-04-01 LAB
ALBUMIN SERPL BCG-MCNC: 5 G/DL (ref 3.5–5)
ALP SERPL-CCNC: 41 U/L (ref 34–104)
ALT SERPL W P-5'-P-CCNC: 13 U/L (ref 7–52)
ANION GAP SERPL CALCULATED.3IONS-SCNC: 14 MMOL/L (ref 4–13)
AST SERPL W P-5'-P-CCNC: 18 U/L (ref 13–39)
B-HCG SERPL-ACNC: ABNORMAL MIU/ML (ref 0–5)
BACTERIA UR QL AUTO: ABNORMAL /HPF
BASOPHILS # BLD AUTO: 0.03 THOUSANDS/ÂΜL (ref 0–0.1)
BASOPHILS NFR BLD AUTO: 0 % (ref 0–1)
BILIRUB SERPL-MCNC: 0.53 MG/DL (ref 0.2–1)
BILIRUB UR QL STRIP: NEGATIVE
BUN SERPL-MCNC: 8 MG/DL (ref 5–25)
CALCIUM SERPL-MCNC: 10.3 MG/DL (ref 8.4–10.2)
CHLORIDE SERPL-SCNC: 101 MMOL/L (ref 96–108)
CLARITY UR: ABNORMAL
CO2 SERPL-SCNC: 20 MMOL/L (ref 21–32)
COLOR UR: YELLOW
CREAT SERPL-MCNC: 0.61 MG/DL (ref 0.6–1.3)
EOSINOPHIL # BLD AUTO: 0.01 THOUSAND/ÂΜL (ref 0–0.61)
EOSINOPHIL NFR BLD AUTO: 0 % (ref 0–6)
ERYTHROCYTE [DISTWIDTH] IN BLOOD BY AUTOMATED COUNT: 14.6 % (ref 11.6–15.1)
EXT PREGNANCY TEST URINE: POSITIVE
EXT. CONTROL: ABNORMAL
FLUAV AG UPPER RESP QL IA.RAPID: NEGATIVE
FLUBV AG UPPER RESP QL IA.RAPID: NEGATIVE
GFR SERPL CREATININE-BSD FRML MDRD: 126 ML/MIN/1.73SQ M
GLUCOSE SERPL-MCNC: 92 MG/DL (ref 65–140)
GLUCOSE UR STRIP-MCNC: NEGATIVE MG/DL
HCT VFR BLD AUTO: 39.8 % (ref 34.8–46.1)
HGB BLD-MCNC: 13.2 G/DL (ref 11.5–15.4)
HGB UR QL STRIP.AUTO: NEGATIVE
IMM GRANULOCYTES # BLD AUTO: 0.08 THOUSAND/UL (ref 0–0.2)
IMM GRANULOCYTES NFR BLD AUTO: 1 % (ref 0–2)
KETONES UR STRIP-MCNC: ABNORMAL MG/DL
LEUKOCYTE ESTERASE UR QL STRIP: ABNORMAL
LIPASE SERPL-CCNC: 13 U/L (ref 11–82)
LYMPHOCYTES # BLD AUTO: 1.52 THOUSANDS/ÂΜL (ref 0.6–4.47)
LYMPHOCYTES NFR BLD AUTO: 11 % (ref 14–44)
MAGNESIUM SERPL-MCNC: 2 MG/DL (ref 1.9–2.7)
MCH RBC QN AUTO: 28.6 PG (ref 26.8–34.3)
MCHC RBC AUTO-ENTMCNC: 33.2 G/DL (ref 31.4–37.4)
MCV RBC AUTO: 86 FL (ref 82–98)
MONOCYTES # BLD AUTO: 0.52 THOUSAND/ÂΜL (ref 0.17–1.22)
MONOCYTES NFR BLD AUTO: 4 % (ref 4–12)
MUCOUS THREADS UR QL AUTO: ABNORMAL
NEUTROPHILS # BLD AUTO: 11.22 THOUSANDS/ÂΜL (ref 1.85–7.62)
NEUTS SEG NFR BLD AUTO: 84 % (ref 43–75)
NITRITE UR QL STRIP: NEGATIVE
NON-SQ EPI CELLS URNS QL MICRO: ABNORMAL /HPF
NRBC BLD AUTO-RTO: 0 /100 WBCS
PH UR STRIP.AUTO: 6 [PH]
PLATELET # BLD AUTO: 354 THOUSANDS/UL (ref 149–390)
PMV BLD AUTO: 10 FL (ref 8.9–12.7)
POTASSIUM SERPL-SCNC: 3.3 MMOL/L (ref 3.5–5.3)
PROT SERPL-MCNC: 8.6 G/DL (ref 6.4–8.4)
PROT UR STRIP-MCNC: ABNORMAL MG/DL
RBC # BLD AUTO: 4.62 MILLION/UL (ref 3.81–5.12)
RBC #/AREA URNS AUTO: ABNORMAL /HPF
SARS-COV+SARS-COV-2 AG RESP QL IA.RAPID: NEGATIVE
SODIUM SERPL-SCNC: 135 MMOL/L (ref 135–147)
SP GR UR STRIP.AUTO: >=1.03 (ref 1–1.03)
UROBILINOGEN UR STRIP-ACNC: 2 MG/DL
WBC # BLD AUTO: 13.38 THOUSAND/UL (ref 4.31–10.16)
WBC #/AREA URNS AUTO: ABNORMAL /HPF

## 2025-04-01 PROCEDURE — 84702 CHORIONIC GONADOTROPIN TEST: CPT | Performed by: PHYSICIAN ASSISTANT

## 2025-04-01 PROCEDURE — 96374 THER/PROPH/DIAG INJ IV PUSH: CPT

## 2025-04-01 PROCEDURE — 87811 SARS-COV-2 COVID19 W/OPTIC: CPT | Performed by: PHYSICIAN ASSISTANT

## 2025-04-01 PROCEDURE — 81025 URINE PREGNANCY TEST: CPT | Performed by: PHYSICIAN ASSISTANT

## 2025-04-01 PROCEDURE — 85025 COMPLETE CBC W/AUTO DIFF WBC: CPT | Performed by: PHYSICIAN ASSISTANT

## 2025-04-01 PROCEDURE — 81001 URINALYSIS AUTO W/SCOPE: CPT | Performed by: PHYSICIAN ASSISTANT

## 2025-04-01 PROCEDURE — 80053 COMPREHEN METABOLIC PANEL: CPT | Performed by: PHYSICIAN ASSISTANT

## 2025-04-01 PROCEDURE — 99283 EMERGENCY DEPT VISIT LOW MDM: CPT

## 2025-04-01 PROCEDURE — 99284 EMERGENCY DEPT VISIT MOD MDM: CPT | Performed by: PHYSICIAN ASSISTANT

## 2025-04-01 PROCEDURE — 87804 INFLUENZA ASSAY W/OPTIC: CPT | Performed by: PHYSICIAN ASSISTANT

## 2025-04-01 PROCEDURE — 96361 HYDRATE IV INFUSION ADD-ON: CPT

## 2025-04-01 PROCEDURE — 36415 COLL VENOUS BLD VENIPUNCTURE: CPT | Performed by: PHYSICIAN ASSISTANT

## 2025-04-01 PROCEDURE — 83735 ASSAY OF MAGNESIUM: CPT | Performed by: PHYSICIAN ASSISTANT

## 2025-04-01 PROCEDURE — 83690 ASSAY OF LIPASE: CPT | Performed by: PHYSICIAN ASSISTANT

## 2025-04-01 RX ORDER — POTASSIUM CHLORIDE 1500 MG/1
40 TABLET, EXTENDED RELEASE ORAL ONCE
Status: COMPLETED | OUTPATIENT
Start: 2025-04-01 | End: 2025-04-01

## 2025-04-01 RX ORDER — ONDANSETRON 4 MG/1
4 TABLET, ORALLY DISINTEGRATING ORAL EVERY 6 HOURS PRN
Qty: 20 TABLET | Refills: 0 | Status: SHIPPED | OUTPATIENT
Start: 2025-04-01

## 2025-04-01 RX ORDER — ONDANSETRON 2 MG/ML
4 INJECTION INTRAMUSCULAR; INTRAVENOUS ONCE
Status: COMPLETED | OUTPATIENT
Start: 2025-04-01 | End: 2025-04-01

## 2025-04-01 RX ADMIN — ONDANSETRON 4 MG: 2 INJECTION INTRAMUSCULAR; INTRAVENOUS at 09:43

## 2025-04-01 RX ADMIN — POTASSIUM CHLORIDE 40 MEQ: 1500 TABLET, EXTENDED RELEASE ORAL at 11:20

## 2025-04-01 RX ADMIN — SODIUM CHLORIDE 1000 ML: 0.9 INJECTION, SOLUTION INTRAVENOUS at 09:43

## 2025-04-01 NOTE — ED PROVIDER NOTES
Time reflects when diagnosis was documented in both MDM as applicable and the Disposition within this note       Time User Action Codes Description Comment    4/1/2025 11:19 AM Angelito Elkins Add [Z34.90] Pregnancy     4/1/2025 11:19 AM Angelito Elkins Add [E87.6] Hypokalemia     4/1/2025 11:20 AM Angelito Elkins Add [R11.10] Vomiting           ED Disposition       ED Disposition   Discharge    Condition   Stable    Date/Time   Tue Apr 1, 2025 11:19 AM    Comment   Vannessa Analy Obedmichael discharge to home/self care.                   Assessment & Plan       Medical Decision Making  25-year-old female here for evaluation of nausea vomiting in the setting of possible early pregnancy.  See HPI for further details.  Differential diagnosis includes pregnancy, electrolyte disturbance, acute kidney injury, dehydration, UTI.    Workup is significant for an hCG level of 27,000, she has no vaginal bleeding therefore does not require any ultrasounds at this point.  She does have appropriate follow-up with OB there are some mild dehydration she is receiving IV fluids and will give her 40 mEq of potassium.  She is stable for discharge home we will send Zofran.    Amount and/or Complexity of Data Reviewed  Labs: ordered. Decision-making details documented in ED Course.    Risk  Prescription drug management.        ED Course as of 04/01/25 1121   Tue Apr 01, 2025   0923 SpO2: 100 %   0923 Respirations: 14   0923 Pulse: 68   0923 Temperature: 98 °F (36.7 °C)   0923 Blood Pressure: 114/69  Vs reviewed wnl   1108 PREGNANCY TEST URINE(!): Positive       Medications   potassium chloride (Klor-Con M20) CR tablet 40 mEq (has no administration in time range)   sodium chloride 0.9 % bolus 1,000 mL (1,000 mL Intravenous New Bag 4/1/25 0943)   ondansetron (ZOFRAN) injection 4 mg (4 mg Intravenous Given 4/1/25 0943)       ED Risk Strat Scores                            SBIRT 20yo+      Flowsheet Row Most Recent Value   Initial Alcohol  Screen: US AUDIT-C     1. How often do you have a drink containing alcohol? 0 Filed at: 2025   2. How many drinks containing alcohol do you have on a typical day you are drinking?  0 Filed at: 2025   3a. Male UNDER 65: How often do you have five or more drinks on one occasion? 0 Filed at: 2025   3b. FEMALE Any Age, or MALE 65+: How often do you have 4 or more drinks on one occassion? 0 Filed at: 2025   Audit-C Score 0 Filed at: 2025   KARISSA: How many times in the past year have you...    Used an illegal drug or used a prescription medication for non-medical reasons? Never Filed at: 2025                            History of Present Illness       Chief Complaint   Patient presents with    Vomiting     ,Pt states that she has been having nausea and episodes of vomiting for about 2 days... pt hasn't been able to keep anything PO down. Pt states that she thinks she might be pregnant        Past Medical History:   Diagnosis Date     2017    Anemia       Past Surgical History:   Procedure Laterality Date    WISDOM TOOTH EXTRACTION        Family History   Problem Relation Age of Onset    Heart attack Father       Social History     Tobacco Use    Smoking status: Former     Current packs/day: 0.00     Average packs/day: 0.5 packs/day for 5.0 years (2.5 ttl pk-yrs)     Types: Cigarettes     Quit date:      Years since quitting: 3.2    Smokeless tobacco: Never    Tobacco comments:     Vape   Vaping Use    Vaping status: Some Days    Substances: Nicotine   Substance Use Topics    Alcohol use: No     Comment: rare    Drug use: Not Currently     Types: Marijuana     Comment: last use       E-Cigarette/Vaping    E-Cigarette Use Current Some Day User       E-Cigarette/Vaping Substances    Nicotine Yes       I have reviewed and agree with the history as documented.     This is a pleasant 25-year-old female presenting for evaluation of nausea  vomiting ongoing for about 3 to 4 days.  She states that she believes she might be pregnant.  She had a short light menstrual cycle she describes at the end of February.  She denies urinary urgency frequency or burning.    She has some mild abdominal cramping midline.  She denies vaginal bleeding or discharge.  Bedside urine dipstick for pregnancy is positive.  She states this would make her  2 para 1.  First pregnancy resulted in what she describes as severe hyperemesis syndrome.  She denies marijuana use.      Vomiting  Associated symptoms: abdominal pain    Associated symptoms: no arthralgias, no chills, no cough, no fever and no sore throat        Review of Systems   Constitutional:  Negative for chills and fever.   HENT:  Negative for ear pain and sore throat.    Eyes:  Negative for pain and visual disturbance.   Respiratory:  Negative for cough and shortness of breath.    Cardiovascular:  Negative for chest pain and palpitations.   Gastrointestinal:  Positive for abdominal pain, nausea and vomiting.   Genitourinary:  Negative for dysuria and hematuria.   Musculoskeletal:  Negative for arthralgias and back pain.   Skin:  Negative for color change and rash.   Neurological:  Negative for seizures and syncope.   All other systems reviewed and are negative.          Objective       ED Triage Vitals [25]   Temperature Pulse Blood Pressure Respirations SpO2 Patient Position - Orthostatic VS   98 °F (36.7 °C) 68 114/69 14 100 % Sitting      Temp Source Heart Rate Source BP Location FiO2 (%) Pain Score    Temporal Monitor Left arm -- 4      Vitals      Date and Time Temp Pulse SpO2 Resp BP Pain Score FACES Pain Rating User   25 1000 -- 62 98 % 16 102/62 -- -- SK   25 0922 98 °F (36.7 °C) 68 100 % 14 114/69 4 -- AB            Physical Exam  Constitutional:       General: She is not in acute distress.     Appearance: She is well-developed. She is not ill-appearing, toxic-appearing or  diaphoretic.   HENT:      Right Ear: External ear normal. No swelling. Tympanic membrane is not bulging.      Left Ear: External ear normal. No swelling. Tympanic membrane is not bulging.      Nose: Nose normal.      Mouth/Throat:      Pharynx: No oropharyngeal exudate.   Eyes:      General: Lids are normal.      Conjunctiva/sclera: Conjunctivae normal.      Pupils: Pupils are equal, round, and reactive to light.   Neck:      Thyroid: No thyromegaly.      Vascular: No JVD.      Trachea: No tracheal deviation.   Cardiovascular:      Rate and Rhythm: Normal rate and regular rhythm.      Pulses: Normal pulses.      Heart sounds: Normal heart sounds. No murmur heard.     No friction rub. No gallop.   Pulmonary:      Effort: Pulmonary effort is normal. No respiratory distress.      Breath sounds: Normal breath sounds. No stridor. No wheezing or rales.   Chest:      Chest wall: No tenderness.   Abdominal:      General: Bowel sounds are normal. There is no distension.      Palpations: Abdomen is soft. There is no mass.      Tenderness: There is no abdominal tenderness. There is no guarding or rebound.      Hernia: No hernia is present.   Musculoskeletal:         General: Normal range of motion.      Cervical back: Normal range of motion and neck supple. No edema. Normal range of motion.   Lymphadenopathy:      Cervical: No cervical adenopathy.   Skin:     General: Skin is warm and dry.      Coloration: Skin is not pale.      Findings: No erythema or rash.   Neurological:      Mental Status: She is alert and oriented to person, place, and time.      GCS: GCS eye subscore is 4. GCS verbal subscore is 5. GCS motor subscore is 6.      Cranial Nerves: No cranial nerve deficit.      Sensory: No sensory deficit.      Deep Tendon Reflexes: Reflexes are normal and symmetric.   Psychiatric:         Speech: Speech normal.         Behavior: Behavior normal.         Results Reviewed       Procedure Component Value Units Date/Time     hCG, quantitative [[id number]]  (Abnormal) Collected: 04/01/25 0935    Lab Status: Final result Specimen: Blood from Arm, Right Updated: 04/01/25 1039     HCG, Quant 27,854.0 mIU/mL     Narrative:       Expected Ranges:    HCG results between 5.0 and 25.0 mIU/mL may be indicative of early pregnancy but should be interpreted in light of the total clinical presentation.    HCG can rise to detectable levels in henri and post menopausal women (0-11.6 mIU/mL).     Approximate               Approximate HCG  Gestation age          Concentration ( mIU/mL)  _____________          ______________________   Weeks                      HCG values  0.2-1                       5-50  1-2                           2-3                         100-5000  3-4                         500-71284  4-5                         1000-33451  5-6                         [id number]  6-8                         [id number]  8-12                        [id number]      Comprehensive metabolic panel [[id number]]  (Abnormal) Collected: 04/01/25 0935    Lab Status: Final result Specimen: Blood from Arm, Right Updated: 04/01/25 1002     Sodium 135 mmol/L      Potassium 3.3 mmol/L      Chloride 101 mmol/L      CO2 20 mmol/L      ANION GAP 14 mmol/L      BUN 8 mg/dL      Creatinine 0.61 mg/dL      Glucose 92 mg/dL      Calcium 10.3 mg/dL      AST 18 U/L      ALT 13 U/L      Alkaline Phosphatase 41 U/L      Total Protein 8.6 g/dL      Albumin 5.0 g/dL      Total Bilirubin 0.53 mg/dL      eGFR 126 ml/min/1.73sq m     Narrative:      National Kid[employer] Foundation guidelines for Chronic Kidney Disease (CKD):     Stage 1 with normal or high GFR (GFR > 90 mL/min/1.73 square meters)    Stage 2 Mild CKD (GFR = 60-89 mL/min/1.73 square meters)    Stage 3A Moderate CKD (GFR = 45-59 mL/min/1.73 square meters)    Stage 3B Moderate CKD (GFR = 30-44 mL/min/1.73 square meters)    Stage 4 Severe CKD (GFR = 15-29 mL/min/1.73 square meters)    Stage 5 End Stage  CKD (GFR <15 mL/min/1.73 square meters)  Note: GFR calculation is accurate only with a steady state creatinine    Lipase [[id number]]  (Normal) Collected: 04/01/25 0935    Lab Status: Final result Specimen: Blood from Arm, Right Updated: 04/01/25 1002     Lipase 13 u/L     Magnesium [[id number]]  (Normal) Collected: 04/01/25 0935    Lab Status: Final result Specimen: Blood from Arm, Right Updated: 04/01/25 1002     Magnesium 2.0 mg/dL     FLU/COVID Rapid Antigen (30 min. TAT) - Preferred screening test in ED [[id number]]  (Normal) Collected: 04/01/25 0935    Lab Status: Final result Specimen: Nares from Nose Updated: 04/01/25 1001     SARS COV Rapid Antigen Negative     Influenza A Rapid Antigen Negative     Influenza B Rapid Antigen Negative    Narrative:      This test has been performed using the [employer] Deisi 2 FLU+SARS Antigen test under the Emergency Use Authorization (EUA). This test has been validated by the  and verified by the performing laboratory. The Deisi uses lateral flow immunofluorescent sandwich assay to detect SARS-COV, Influenza A and Influenza B Antigen.     The [employer] De[employer] 2 SARS Antigen test does not differentiate between SARS-CoV and SARS-CoV-2.     Negative results are presumptive and may be confirmed with a molecular assay, if necessary, for patient management. Negative results do not rule out SARS-CoV-2 or influenza infection and should not be used as the sole basis for treatment or patient management decisions. A negative test result may occur if the level of antigen in a sample is below the limit of detection of this test.     Positive results are indicative of the presence of viral antigens, but do not rule out bacterial infection or co-infection with other viruses.     All test results should be used as an adjunct to clinical observations and other information available to the provider.    FOR PEDIATRIC PATIENTS - copy/paste COVID Guidelines URL to browser:  [email or web address]    Urine Microscopic [[id number]]  (Abnormal) Collected: 04/01/25 0935    Lab Status: Final result Specimen: Urine, Clean Catch Updated: 04/01/25 0955     RBC, UA None Seen /hpf      WBC, UA 2-4 /hpf      Epithelial Cells Occasional /hpf      Bacteria, UA Occasional /hpf      MUCUS THREADS Moderate    UA w Reflex to Microscopic w Reflex to Culture [[id number]]  (Abnormal) Collected: 04/01/25 0935    Lab Status: Final result Specimen: Urine, Clean Catch Updated: 04/01/25 0947     Color, UA Yellow     Clarity, UA Slightly Cloudy     Specific Gravity, UA >=1.030     pH, UA 6.0     Leukocytes, UA Small     Nitrite, UA Negative     Protein, UA 30 (1+) mg/dl      Glucose, UA Negative mg/dl      Ketones,  (4+) mg/dl      Urobilinogen, UA 2.0 mg/dl      Bilirubin, UA Negative     Occult Blood, UA Negative    CBC and differential [[id number]]  (Abnormal) Collected: 04/01/25 0935    Lab Status: Final result Specimen: Blood from Arm, Right Updated: 04/01/25 0945     WBC 13.38 Thousand/uL      RBC 4.62 Million/uL      Hemoglobin 13.2 g/dL      Hematocrit 39.8 %      MCV 86 fL      MCH 28.6 pg      MCHC 33.2 g/dL      RDW 14.6 %      MPV 10.0 fL      Platelets 354 Thousands/uL      nRBC 0 /100 WBCs      Segmented % 84 %      Immature Grans % 1 %      Lymphocytes % 11 %      Monocytes % 4 %      Eosinophils Relative 0 %      Basophils Relative 0 %      Absolute Neutrophils 11.22 Thousands/µL      Absolute Immature Grans 0.08 Thousand/uL      Absolute Lymphocytes 1.52 Thousands/µL      Absolute Monocytes 0.52 Thousand/µL      Eosinophils Absolute 0.01 Thousand/µL      Basophils Absolute 0.03 Thousands/µL     POCT pregnancy, urine [[id number]]  (Abnormal) Collected: 04/01/25 0936    Lab Status: Final result Updated: 04/01/25 0939     EXT Preg Test, Ur Positive     Control Valid            No orders to display       Procedures    ED Medication and Procedure  Management   Prior to Admission Medications   Prescriptions Last Dose Informant Patient Reported? Taking?   ferrous sulfate 325 (65 Fe) mg tablet 3/31/2025  No Yes   Sig: Take 1 tablet (325 mg total) by mouth every other day   ondansetron (ZOFRAN) 4 mg tablet Not Taking  No No   Sig: Take 1 tablet (4 mg total) by mouth every 6 (six) hours   Patient not taking: Reported on 4/1/2025      Facility-Administered Medications: None     Patient's Medications   Discharge Prescriptions    ONDANSETRON (ZOFRAN-ODT) 4 MG DISINTEGRATING TABLET    Take 1 tablet (4 mg total) by mouth every 6 (six) hours as needed for nausea or vomiting for up to 20 doses       Start Date: 4/1/2025  End Date: --       Order Dose: 4 mg       Quantity: 20 tablet    Refills: 0     No discharge procedures on file.  ED SEPSIS DOCUMENTATION   Time reflects when diagnosis was documented in both MDM as applicable and the Disposition within this note       Time User Action Codes Description Comment    4/1/2025 11:19 AM Angelito Elkins [Z34.90] Pregnancy     4/1/2025 11:19 AM Angelito Elkins [E87.6] Hypokalemia     4/1/2025 11:20 AM Angelito Elkins Add [R11.10] Vomiting                  Angelito Elkins PA-C  04/01/25 1121

## 2025-04-02 ENCOUNTER — HOSPITAL ENCOUNTER (EMERGENCY)
Facility: HOSPITAL | Age: 25
Discharge: HOME | End: 2025-04-02
Attending: INTERNAL MEDICINE
Payer: COMMERCIAL

## 2025-04-02 ENCOUNTER — NURSE TRIAGE (OUTPATIENT)
Age: 25
End: 2025-04-02

## 2025-04-02 VITALS
HEIGHT: 63 IN | SYSTOLIC BLOOD PRESSURE: 135 MMHG | WEIGHT: 105 LBS | OXYGEN SATURATION: 99 % | RESPIRATION RATE: 17 BRPM | HEART RATE: 58 BPM | TEMPERATURE: 98.4 F | DIASTOLIC BLOOD PRESSURE: 82 MMHG | BODY MASS INDEX: 18.61 KG/M2

## 2025-04-02 DIAGNOSIS — O21.0 HYPEREMESIS GRAVIDARUM: Primary | ICD-10-CM

## 2025-04-02 LAB
ANION GAP SERPL CALCULATED.3IONS-SCNC: 10 MMOL/L (ref 4–13)
B-HCG SERPL-ACNC: ABNORMAL MIU/ML (ref 0–5)
BASOPHILS # BLD AUTO: 0.01 THOUSANDS/ÂΜL (ref 0–0.1)
BASOPHILS NFR BLD AUTO: 0 % (ref 0–1)
BUN SERPL-MCNC: 9 MG/DL (ref 5–25)
CALCIUM SERPL-MCNC: 9.4 MG/DL (ref 8.4–10.2)
CHLORIDE SERPL-SCNC: 105 MMOL/L (ref 96–108)
CO2 SERPL-SCNC: 20 MMOL/L (ref 21–32)
CREAT SERPL-MCNC: 0.57 MG/DL (ref 0.6–1.3)
EOSINOPHIL # BLD AUTO: 0 THOUSAND/ÂΜL (ref 0–0.61)
EOSINOPHIL NFR BLD AUTO: 0 % (ref 0–6)
ERYTHROCYTE [DISTWIDTH] IN BLOOD BY AUTOMATED COUNT: 14.7 % (ref 11.6–15.1)
GFR SERPL CREATININE-BSD FRML MDRD: 129 ML/MIN/1.73SQ M
GLUCOSE SERPL-MCNC: 78 MG/DL (ref 65–140)
HCT VFR BLD AUTO: 34.5 % (ref 34.8–46.1)
HGB BLD-MCNC: 11.5 G/DL (ref 11.5–15.4)
IMM GRANULOCYTES # BLD AUTO: 0.03 THOUSAND/UL (ref 0–0.2)
IMM GRANULOCYTES NFR BLD AUTO: 0 % (ref 0–2)
LYMPHOCYTES # BLD AUTO: 1.58 THOUSANDS/ÂΜL (ref 0.6–4.47)
LYMPHOCYTES NFR BLD AUTO: 19 % (ref 14–44)
MAGNESIUM SERPL-MCNC: 1.8 MG/DL (ref 1.9–2.7)
MCH RBC QN AUTO: 28.8 PG (ref 26.8–34.3)
MCHC RBC AUTO-ENTMCNC: 33.3 G/DL (ref 31.4–37.4)
MCV RBC AUTO: 86 FL (ref 82–98)
MONOCYTES # BLD AUTO: 0.53 THOUSAND/ÂΜL (ref 0.17–1.22)
MONOCYTES NFR BLD AUTO: 7 % (ref 4–12)
NEUTROPHILS # BLD AUTO: 6.01 THOUSANDS/ÂΜL (ref 1.85–7.62)
NEUTS SEG NFR BLD AUTO: 74 % (ref 43–75)
NRBC BLD AUTO-RTO: 0 /100 WBCS
PLATELET # BLD AUTO: 302 THOUSANDS/UL (ref 149–390)
PMV BLD AUTO: 10 FL (ref 8.9–12.7)
POTASSIUM SERPL-SCNC: 3.6 MMOL/L (ref 3.5–5.3)
RBC # BLD AUTO: 4 MILLION/UL (ref 3.81–5.12)
SODIUM SERPL-SCNC: 135 MMOL/L (ref 135–147)
WBC # BLD AUTO: 8.16 THOUSAND/UL (ref 4.31–10.16)

## 2025-04-02 PROCEDURE — 99284 EMERGENCY DEPT VISIT MOD MDM: CPT

## 2025-04-02 PROCEDURE — 84702 CHORIONIC GONADOTROPIN TEST: CPT | Performed by: INTERNAL MEDICINE

## 2025-04-02 PROCEDURE — 96365 THER/PROPH/DIAG IV INF INIT: CPT

## 2025-04-02 PROCEDURE — 85025 COMPLETE CBC W/AUTO DIFF WBC: CPT | Performed by: INTERNAL MEDICINE

## 2025-04-02 PROCEDURE — 83735 ASSAY OF MAGNESIUM: CPT | Performed by: INTERNAL MEDICINE

## 2025-04-02 PROCEDURE — 80048 BASIC METABOLIC PNL TOTAL CA: CPT | Performed by: INTERNAL MEDICINE

## 2025-04-02 PROCEDURE — 96361 HYDRATE IV INFUSION ADD-ON: CPT

## 2025-04-02 PROCEDURE — 36415 COLL VENOUS BLD VENIPUNCTURE: CPT | Performed by: INTERNAL MEDICINE

## 2025-04-02 PROCEDURE — 99284 EMERGENCY DEPT VISIT MOD MDM: CPT | Performed by: INTERNAL MEDICINE

## 2025-04-02 PROCEDURE — 96375 TX/PRO/DX INJ NEW DRUG ADDON: CPT

## 2025-04-02 RX ORDER — ONDANSETRON 2 MG/ML
4 INJECTION INTRAMUSCULAR; INTRAVENOUS ONCE
Status: COMPLETED | OUTPATIENT
Start: 2025-04-02 | End: 2025-04-02

## 2025-04-02 RX ADMIN — SODIUM CHLORIDE 1000 ML: 0.9 INJECTION, SOLUTION INTRAVENOUS at 11:24

## 2025-04-02 RX ADMIN — ONDANSETRON 4 MG: 2 INJECTION INTRAMUSCULAR; INTRAVENOUS at 11:24

## 2025-04-02 RX ADMIN — THIAMINE HYDROCHLORIDE 100 MG: 100 INJECTION, SOLUTION INTRAMUSCULAR; INTRAVENOUS at 12:53

## 2025-04-02 NOTE — TELEPHONE ENCOUNTER
"FOLLOW UP: [facility] message to Dr. Corrales    REASON FOR CONVERSATION: Vomiting During Pregnancy    SYMPTOMS: nausea, vomiting    OTHER: This patient is 5 weeks pregnant according to LMP of 2/26. She is vomiting and constant nausea for 5 days, had 1 episode of diarrhea. Vomited more than 7 times in the last 24 hours. She went to urgent care yesterday and received zofran prescription without relief. Also tried morning sickness gummies without relief. Had a few sips of water this morning. Last time she urinated was 2 am. She gets lightheaded after vomiting, has a dry mouth, can't keep any food down. Has diaphoresis and chills as well. Advised she go to ED at this time. she lives in [place], she said she will go to ED there. Also advised she have someone drive her for safety.     DISPOSITION: Go to ED/UCC Now (Or to Office with PCP Approval)      Reason for Disposition   SEVERE vomiting (e.g., > 6 times / day) and present > 8 hours    Answer Assessment - Initial Assessment Questions  1. SEVERITY - NAUSEA: \"How bad is the nausea?\" (e.g., none; mild, moderate, severe)      Severe - constant   2. SEVERITY - VOMITING: \"Are you vomiting?\" If Yes, ask: \"How many times have you vomited in the past 24 hours?\" (e.g., nausea only; mild, moderate or severe vomiting)      More than 7 times   3. ONSET: \"When did the nausea or vomiting begin?\"       Friday - 5 days ago ( getting worse every day)   4. FLUIDS: \"What fluids or food have you vomited up today?\" \"Are you able to keep any liquids down?\"      Sipping with with lemon this morning)   5. TREATMENT: \"What have you been doing so far to treat this?\"       Zofran, morning sickness gummies -  no relief     6. DEHYDRATION: \"When was the last time you urinated?\" \"Are you feeling lightheaded?\" \"Weight loss?\"      2 am.  Lightheaded after vomiting.  Dry mouth.    7. PREGNANCY: \"How many weeks pregnant are you?\" \"How has the pregnancy been going?\"      5 weeks   8. DREA: \"What date are you " "expecting to deliver?\"      LMP: 2/26/25  9. MEDICINES: \"What medicines are you taking?\" (e.g., iron, opioid pain medicines, prenatal vitamins, vitamin B6)      Zofran, prenatal vitamins    10. OTHER SYMPTOMS: \"Do you have any other symptoms?\" (e.g., diarrhea, fever)        Diarrhea - once.  Diaphoresis, chills.    Protocols used: Pregnancy - Morning Sickness (Nausea and Vomiting of Pregnancy)-Adult-[place]    "

## 2025-04-02 NOTE — ED PROVIDER NOTES
Time reflects when diagnosis was documented in both MDM as applicable and the Disposition within this note       Time User Action Codes Description Comment    4/2/2025  1:29 PM Randall Chandra Add [O21.0] Hyperemesis gravidarum           ED Disposition       ED Disposition   Discharge    Condition   Stable    Date/Time   Wed Apr 2, 2025  1:29 PM    Comment   Vannessa Birmingham discharge to home/self care.                   Assessment & Plan       Medical Decision Making  25-year-old with a history of hyperemesis gravidarum in her previous pregnancy, comes in about 5 weeks pregnant unable to keep anything down for the last 48 hours.  Denies fever or chills.  Has no abdominal pain.  Similar symptoms to her last pregnancy.  No significant past medical history otherwise.  She notably got pregnant while she had the implanted birth control.    While differential includes viral gastroenteritis, more likely hyperemesis gravidarum.  Notably seen in the urgent care given Zofran which was ineffective.  Appears dehydrated today.  Will move to IV fluids, IV Zofran.  Consider Reglan.  5 mL also given.    Amount and/or Complexity of Data Reviewed  Labs: ordered.     Details: Labs all appear within normal limits.  Slightly low magnesium of 1.8.  hCG continues to climb.    Risk  OTC drugs.  Prescription drug management.  Risk Details: Patient does have follow-up with OB GYN in the future.  For now we will treat with thiamine.  Might consider Reglan in the future.  Mother was present, discussed with her as well.  Trying to give the patient least amount of medications given the early pregnancy.  They are both understanding.  Discharge stable condition.        ED Course as of 04/02/25 2134   Wed Apr 02, 2025   1231 Patient is feeling better.  Continue IV fluids for now.  Potential discharge on Reglan and thiamine.   1328 Patient had IV hydration, seems to be feeling better after IV Zofran.  Will hold on giving Reglan.  "      Medications   sodium chloride 0.9 % bolus 1,000 mL (0 mL Intravenous Stopped 25 1349)   ondansetron (ZOFRAN) injection 4 mg (4 mg Intravenous Given 25 1124)   thiamine (VITAMIN B1) 100 mg in sodium chloride 0.9 % 50 mL IVPB (0 mg Intravenous Stopped 25 1349)       ED Risk Strat Scores                            SBIRT 22yo+      Flowsheet Row Most Recent Value   Initial Alcohol Screen: US AUDIT-C     1. How often do you have a drink containing alcohol? 0 Filed at: 2025 1058   2. How many drinks containing alcohol do you have on a typical day you are drinking?  0 Filed at: 2025 1058   3b. FEMALE Any Age, or MALE 65+: How often do you have 4 or more drinks on one occassion? 0 Filed at: 2025 1058   Audit-C Score 0 Filed at: 2025 1058   KARISSA: How many times in the past year have you...    Used an illegal drug or used a prescription medication for non-medical reasons? Never Filed at: 2025 1058                            History of Present Illness       Chief Complaint   Patient presents with    Vomiting During Pregnancy     Ambulatory w/complaint of vomiting x3 days; admits to pregnancy LMP 2025; evaluated at Urgent Care yesterday for same, prescribed Zofran \"but it's not working\"; admits to previous pregnancy w/hyperemesis; states OBGYN sent pt to ED for further evaluation       Past Medical History:   Diagnosis Date     2017    Anemia       Past Surgical History:   Procedure Laterality Date    WISDOM TOOTH EXTRACTION        Family History   Problem Relation Age of Onset    Heart attack Father       Social History     Tobacco Use    Smoking status: Former     Current packs/day: 0.00     Average packs/day: 0.5 packs/day for 5.0 years (2.5 ttl pk-yrs)     Types: Cigarettes     Quit date:      Years since quitting: 3.2    Smokeless tobacco: Never    Tobacco comments:     Vape   Vaping Use    Vaping status: Some Days    Substances: Nicotine, Flavoring "   Substance Use Topics    Alcohol use: No     Comment: rare    Drug use: Not Currently     Types: Marijuana     Comment: last use 8/30      E-Cigarette/Vaping    E-Cigarette Use Current Some Day User       E-Cigarette/Vaping Substances    Nicotine Yes     THC No     CBD No     Flavoring Yes     Other No     Unknown No       I have reviewed and agree with the history as documented.     25-year-old with a history of hyperemesis gravidarum in her previous pregnancy, comes in about 5 weeks pregnant unable to keep anything down for the last 48 hours.  Denies fever or chills.  Has no abdominal pain.  Similar symptoms to her last pregnancy.  No significant past medical history otherwise.  She notably got pregnant while she had the implanted birth control.        Review of Systems   Constitutional:  Negative for chills and fever.   HENT:  Negative for ear pain and sore throat.    Eyes:  Negative for pain and visual disturbance.   Respiratory:  Negative for cough and shortness of breath.    Cardiovascular:  Negative for chest pain and palpitations.   Gastrointestinal:  Positive for nausea and vomiting. Negative for abdominal pain.   Genitourinary:  Negative for dysuria and hematuria.   Musculoskeletal:  Negative for arthralgias and back pain.   Skin:  Negative for color change and rash.   Neurological:  Negative for seizures and syncope.   All other systems reviewed and are negative.          Objective       ED Triage Vitals [04/02/25 1056]   Temperature Pulse Blood Pressure Respirations SpO2 Patient Position - Orthostatic VS   98.4 °F (36.9 °C) 58 135/82 17 99 % Lying      Temp Source Heart Rate Source BP Location FiO2 (%) Pain Score    Temporal Monitor Left arm -- No Pain      Vitals      Date and Time Temp Pulse SpO2 Resp BP Pain Score FACES Pain Rating User   04/02/25 1056 98.4 °F (36.9 °C) 58 99 % 17 135/82 No Pain -- TB            Physical Exam  Vitals and nursing note reviewed.   Constitutional:       General: She is  not in acute distress.     Appearance: Normal appearance. She is well-developed. She is ill-appearing.   HENT:      Head: Normocephalic and atraumatic.   Eyes:      Conjunctiva/sclera: Conjunctivae normal.   Cardiovascular:      Rate and Rhythm: Normal rate and regular rhythm.      Pulses: Normal pulses.      Heart sounds: Normal heart sounds. No murmur heard.  Pulmonary:      Effort: Pulmonary effort is normal. No respiratory distress.      Breath sounds: Normal breath sounds.   Abdominal:      Palpations: Abdomen is soft.      Tenderness: There is no abdominal tenderness.   Musculoskeletal:         General: No swelling.      Cervical back: Neck supple.   Skin:     General: Skin is warm and dry.      Capillary Refill: Capillary refill takes less than 2 seconds.      Coloration: Skin is pale.   Neurological:      General: No focal deficit present.      Mental Status: She is alert and oriented to person, place, and time.   Psychiatric:         Mood and Affect: Mood normal.         Results Reviewed       Procedure Component Value Units Date/Time    hCG, quantitative [[id number]]  (Abnormal) Collected: 04/02/25 1124    Lab Status: Final result Specimen: Blood from Arm, Left Updated: 04/02/25 1212     HCG, Quant 34,374.0 mIU/mL     Narrative:       Expected Ranges:    HCG results between 5.0 and 25.0 mIU/mL may be indicative of early pregnancy but should be interpreted in light of the total clinical presentation.    HCG can rise to detectable levels in henri and post menopausal women (0-11.6 mIU/mL).     Approximate               Approximate HCG  Gestation age          Concentration ( mIU/mL)  _____________          ______________________   Weeks                      HCG values  0.2-1                       5-50  1-2                           2-3                         100-5000  3-4                         500-18044  4-5                         1000-85620  5-6                         [id number]  6-8                          [id number]  8-12                        [id number]      Basic metabolic panel [[id number]]  (Abnormal) Collected: 04/02/25 1124    Lab Status: Final result Specimen: Blood from Arm, Left Updated: 04/02/25 1144     Sodium 135 mmol/L      Potassium 3.6 mmol/L      Chloride 105 mmol/L      CO2 20 mmol/L      ANION GAP 10 mmol/L      BUN 9 mg/dL      Creatinine 0.57 mg/dL      Glucose 78 mg/dL      Calcium 9.4 mg/dL      eGFR 129 ml/min/1.73sq m     Narrative:      National Kidney Disease Foundation guidelines for Chronic Kidney Disease (CKD):     Stage 1 with normal or high GFR (GFR > 90 mL/min/1.73 square meters)    Stage 2 Mild CKD (GFR = 60-89 mL/min/1.73 square meters)    Stage 3A Moderate CKD (GFR = 45-59 mL/min/1.73 square meters)    Stage 3B Moderate CKD (GFR = 30-44 mL/min/1.73 square meters)    Stage 4 Severe CKD (GFR = 15-29 mL/min/1.73 square meters)    Stage 5 End Stage CKD (GFR <15 mL/min/1.73 square meters)  Note: GFR calculation is accurate only with a steady state creatinine    Magnesium [[id number]]  (Abnormal) Collected: 04/02/25 1124    Lab Status: Final result Specimen: Blood from Arm, Left Updated: 04/02/25 1144     Magnesium 1.8 mg/dL     CBC and differential [[id number]]  (Abnormal) Collected: 04/02/25 1124    Lab Status: Final result Specimen: Blood from Arm, Left Updated: 04/02/25 1131     WBC 8.16 Thousand/uL      RBC 4.00 Million/uL      Hemoglobin 11.5 g/dL      Hematocrit 34.5 %      MCV 86 fL      MCH 28.8 pg      MCHC 33.3 g/dL      RDW 14.7 %      MPV 10.0 fL      Platelets 302 Thousands/uL      nRBC 0 /100 WBCs      Segmented % 74 %      Immature Grans % 0 %      Lymphocytes % 19 %      Monocytes % 7 %      Eosinophils Relative 0 %      Basophils Relative 0 %      Absolute Neutrophils 6.01 Thousands/µL      Absolute Immature Grans 0.03 Thousand/uL      Absolute Lymphocytes 1.58 Thousands/µL      Absolute Monocytes 0.53 Thousand/µL      Eosinophils Absolute 0.00 Thousand/µL       Basophils Absolute 0.01 Thousands/µL             No orders to display       Procedures    ED Medication and Procedure Management   Prior to Admission Medications   Prescriptions Last Dose Informant Patient Reported? Taking?   ferrous sulfate 325 (65 Fe) mg tablet   No No   Sig: Take 1 tablet (325 mg total) by mouth every other day   ondansetron (ZOFRAN) 4 mg tablet   No No   Sig: Take 1 tablet (4 mg total) by mouth every 6 (six) hours   Patient not taking: Reported on 4/1/2025   ondansetron (ZOFRAN-ODT) 4 mg disintegrating tablet   No No   Sig: Take 1 tablet (4 mg total) by mouth every 6 (six) hours as needed for nausea or vomiting for up to 20 doses      Facility-Administered Medications: None     Discharge Medication List as of 4/2/2025  1:33 PM        START taking these medications    Details   thiamine 50 MG tablet Take 1 tablet (50 mg total) by mouth daily, Starting Wed 4/2/2025, Normal           CONTINUE these medications which have NOT CHANGED    Details   ferrous sulfate 325 (65 Fe) mg tablet Take 1 tablet (325 mg total) by mouth every other day, Starting Sun 9/8/2024, Normal      ondansetron (ZOFRAN) 4 mg tablet Take 1 tablet (4 mg total) by mouth every 6 (six) hours, Starting Sun 2/23/2025, Normal      ondansetron (ZOFRAN-ODT) 4 mg disintegrating tablet Take 1 tablet (4 mg total) by mouth every 6 (six) hours as needed for nausea or vomiting for up to 20 doses, Starting Tue 4/1/2025, Normal           No discharge procedures on file.  ED SEPSIS DOCUMENTATION   Time reflects when diagnosis was documented in both MDM as applicable and the Disposition within this note       Time User Action Codes Description Comment    4/2/2025  1:29 PM Randall Chandra Add [O21.0] Hyperemesis gravidarum                  Randall Chandra DO  04/02/25 9465

## 2025-04-02 NOTE — ED TRIAGE NOTES
"Ambulatory w/complaint of vomiting x3 days; admits to pregnancy LMP 26 Feb 2025; evaluated at Urgent Care yesterday for same, prescribed Zofran \"but it's not working\"; admits to previous pregnancy w/hyperemesis; states OBGYN sent pt to ED for further evaluation  "

## 2025-04-02 NOTE — DISCHARGE INSTRUCTIONS
Continue hydration.  Consider ginger ale for nausea.  Continue Zofran.  Call your OB for further instructions  Return if symptoms return.

## 2025-04-15 ENCOUNTER — HOSPITAL ENCOUNTER (EMERGENCY)
Facility: HOSPITAL | Age: 25
Discharge: HOME | End: 2025-04-15
Attending: EMERGENCY MEDICINE
Payer: COMMERCIAL

## 2025-04-15 VITALS
SYSTOLIC BLOOD PRESSURE: 105 MMHG | RESPIRATION RATE: 16 BRPM | TEMPERATURE: 99.1 F | HEART RATE: 67 BPM | OXYGEN SATURATION: 97 % | DIASTOLIC BLOOD PRESSURE: 55 MMHG

## 2025-04-15 DIAGNOSIS — O21.0 HYPEREMESIS GRAVIDARUM: Primary | ICD-10-CM

## 2025-04-15 LAB
ALBUMIN SERPL BCG-MCNC: 4.2 G/DL (ref 3.5–5)
ALP SERPL-CCNC: 32 U/L (ref 34–104)
ALT SERPL W P-5'-P-CCNC: 9 U/L (ref 7–52)
ANION GAP SERPL CALCULATED.3IONS-SCNC: 11 MMOL/L (ref 4–13)
AST SERPL W P-5'-P-CCNC: 16 U/L (ref 13–39)
BASOPHILS # BLD AUTO: 0.04 THOUSANDS/ÂΜL (ref 0–0.1)
BASOPHILS NFR BLD AUTO: 0 % (ref 0–1)
BILIRUB SERPL-MCNC: 0.38 MG/DL (ref 0.2–1)
BUN SERPL-MCNC: 8 MG/DL (ref 5–25)
CALCIUM SERPL-MCNC: 9.3 MG/DL (ref 8.4–10.2)
CHLORIDE SERPL-SCNC: 103 MMOL/L (ref 96–108)
CO2 SERPL-SCNC: 21 MMOL/L (ref 21–32)
CREAT SERPL-MCNC: 0.51 MG/DL (ref 0.6–1.3)
EOSINOPHIL # BLD AUTO: 0.02 THOUSAND/ÂΜL (ref 0–0.61)
EOSINOPHIL NFR BLD AUTO: 0 % (ref 0–6)
ERYTHROCYTE [DISTWIDTH] IN BLOOD BY AUTOMATED COUNT: 14.6 % (ref 11.6–15.1)
GFR SERPL CREATININE-BSD FRML MDRD: 134 ML/MIN/1.73SQ M
GLUCOSE SERPL-MCNC: 89 MG/DL (ref 65–140)
HCG SERPL QL: POSITIVE
HCT VFR BLD AUTO: 35.5 % (ref 34.8–46.1)
HGB BLD-MCNC: 11.6 G/DL (ref 11.5–15.4)
IMM GRANULOCYTES # BLD AUTO: 0.04 THOUSAND/UL (ref 0–0.2)
IMM GRANULOCYTES NFR BLD AUTO: 0 % (ref 0–2)
LYMPHOCYTES # BLD AUTO: 1.25 THOUSANDS/ÂΜL (ref 0.6–4.47)
LYMPHOCYTES NFR BLD AUTO: 14 % (ref 14–44)
MCH RBC QN AUTO: 28.6 PG (ref 26.8–34.3)
MCHC RBC AUTO-ENTMCNC: 32.7 G/DL (ref 31.4–37.4)
MCV RBC AUTO: 88 FL (ref 82–98)
MONOCYTES # BLD AUTO: 0.87 THOUSAND/ÂΜL (ref 0.17–1.22)
MONOCYTES NFR BLD AUTO: 10 % (ref 4–12)
NEUTROPHILS # BLD AUTO: 6.97 THOUSANDS/ÂΜL (ref 1.85–7.62)
NEUTS SEG NFR BLD AUTO: 76 % (ref 43–75)
NRBC BLD AUTO-RTO: 0 /100 WBCS
PLATELET # BLD AUTO: 273 THOUSANDS/UL (ref 149–390)
PMV BLD AUTO: 10.5 FL (ref 8.9–12.7)
POTASSIUM SERPL-SCNC: 3.5 MMOL/L (ref 3.5–5.3)
PROT SERPL-MCNC: 7.2 G/DL (ref 6.4–8.4)
RBC # BLD AUTO: 4.05 MILLION/UL (ref 3.81–5.12)
SODIUM SERPL-SCNC: 135 MMOL/L (ref 135–147)
WBC # BLD AUTO: 9.19 THOUSAND/UL (ref 4.31–10.16)

## 2025-04-15 PROCEDURE — 96376 TX/PRO/DX INJ SAME DRUG ADON: CPT

## 2025-04-15 PROCEDURE — 84703 CHORIONIC GONADOTROPIN ASSAY: CPT

## 2025-04-15 PROCEDURE — 85025 COMPLETE CBC W/AUTO DIFF WBC: CPT

## 2025-04-15 PROCEDURE — 96361 HYDRATE IV INFUSION ADD-ON: CPT

## 2025-04-15 PROCEDURE — 99283 EMERGENCY DEPT VISIT LOW MDM: CPT

## 2025-04-15 PROCEDURE — 80053 COMPREHEN METABOLIC PANEL: CPT

## 2025-04-15 PROCEDURE — 96375 TX/PRO/DX INJ NEW DRUG ADDON: CPT

## 2025-04-15 PROCEDURE — 96374 THER/PROPH/DIAG INJ IV PUSH: CPT

## 2025-04-15 PROCEDURE — 36415 COLL VENOUS BLD VENIPUNCTURE: CPT

## 2025-04-15 RX ORDER — FAMOTIDINE 10 MG/ML
20 INJECTION, SOLUTION INTRAVENOUS ONCE
Status: COMPLETED | OUTPATIENT
Start: 2025-04-15 | End: 2025-04-15

## 2025-04-15 RX ORDER — ONDANSETRON 2 MG/ML
4 INJECTION INTRAMUSCULAR; INTRAVENOUS ONCE
Status: COMPLETED | OUTPATIENT
Start: 2025-04-15 | End: 2025-04-15

## 2025-04-15 RX ORDER — ONDANSETRON 4 MG/1
4 TABLET, ORALLY DISINTEGRATING ORAL EVERY 6 HOURS PRN
Qty: 8 TABLET | Refills: 0 | Status: SHIPPED | OUTPATIENT
Start: 2025-04-15

## 2025-04-15 RX ADMIN — ONDANSETRON 4 MG: 2 INJECTION INTRAMUSCULAR; INTRAVENOUS at 19:56

## 2025-04-15 RX ADMIN — FAMOTIDINE 20 MG: 10 INJECTION, SOLUTION INTRAVENOUS at 20:32

## 2025-04-15 RX ADMIN — ONDANSETRON 4 MG: 2 INJECTION INTRAMUSCULAR; INTRAVENOUS at 21:40

## 2025-04-15 RX ADMIN — SODIUM CHLORIDE 1000 ML: 0.9 INJECTION, SOLUTION INTRAVENOUS at 19:54

## 2025-04-16 NOTE — DISCHARGE INSTRUCTIONS
Please contact your OB/GYN tomorrow via secure message or phone call to let them know that you have been in the hospital twice for hyperemesis.    Increase fluids by using small amounts frequently instead of large amounts infrequently.    Increase carbohydrate intake by soft breads if sweets are too sickening.    Use Zofran as prescribed and return to the ER for any new or concerning issues.

## 2025-04-16 NOTE — ED PROVIDER NOTES
Time reflects when diagnosis was documented in both MDM as applicable and the Disposition within this note       Time User Action Codes Description Comment    4/15/2025  9:26 PM Ranjit Fournier Add [O21.0] Hyperemesis gravidarum           ED Disposition       ED Disposition   Discharge    Condition   Stable    Date/Time   Tue Apr 15, 2025  9:27 PM    Comment   Vannessa Birmingham discharge to home/self care.                   Assessment & Plan       Medical Decision Making  25-year-old female presents emergency department secondary to hyperemesis gravidarum.  Patient is a G2, P1 female who has been in the emergency department before as well as urgent care for hyperemesis.  The patient notes that she is about 6 to 7 weeks pregnant and denies any lower abdominal pain fever or loss of blood or fluid per vagina.  The patient has been having an increase aversion to foods as well as nausea.  The patient denies any fevers or recent illness other than this.  Patient notes that she ran out of her Zofran so she came back to the emergency department for evaluation.  Differential diagnosis based on my evaluation is gastroenteritis versus hyperemesis.  Patient has vital signs which are stable and lab work which shows no evidence of leukocytosis or electrolyte abnormality.  Patient was given Zofran and IV fluids while in the emergency department with some relief.  The patient was told that they need to contact her OB/GYN who is supposed to be seeing them next month to let them know of the issues that have been going on causing her to seek assistance in the emergency department and urgent care.  Patient was told to come to the hospital if she still cannot hold down fluids or having abdominal pain or fever or chills or loss of fluid.  Patient discharged    Amount and/or Complexity of Data Reviewed  Labs:  Decision-making details documented in ED Course.    Risk  Prescription drug management.        ED Course as of 04/15/25 2313    Tue Apr 15, 2025   2015 Discussed with pharmacy, Pepcid IV formulation is safe for pregnancy.    PREGNANCY, SERUM(!): Positive    Patient with increased relief with Zofran.  Patient will be discharged on more ODT tablets.  Patient notes to have an appointment with her OB for her first eval in May.  I recommended that they contact the office and make sure they are aware of the hyperemesis issue.  Patient was told to sip small amounts of fluids and consider soft breads instead of sweet foods as it may increase the nausea.  Patient should return for new or concerning issues.       Medications   ondansetron (ZOFRAN) injection 4 mg (has no administration in time range)   sodium chloride 0.9 % bolus 1,000 mL (1,000 mL Intravenous New Bag 4/15/25 1954)   ondansetron (ZOFRAN) injection 4 mg (4 mg Intravenous Given 4/15/25 1956)   Famotidine (PF) (PEPCID) injection 20 mg (20 mg Intravenous Given 4/15/25 2032)       ED Risk Strat Scores                    No data recorded                            History of Present Illness       Chief Complaint   Patient presents with    Vomiting     Vomiting x3 days; abdominal discomfort due to vomiting; not able to keep down food or water       Past Medical History:   Diagnosis Date     2017    Anemia       Past Surgical History:   Procedure Laterality Date    WISDOM TOOTH EXTRACTION        Family History   Problem Relation Age of Onset    Heart attack Father       Social History     Tobacco Use    Smoking status: Former     Current packs/day: 0.00     Average packs/day: 0.5 packs/day for 5.0 years (2.5 ttl pk-yrs)     Types: Cigarettes     Quit date:      Years since quitting: 3.2    Smokeless tobacco: Never    Tobacco comments:     Vape   Vaping Use    Vaping status: Some Days    Substances: Nicotine, Flavoring   Substance Use Topics    Alcohol use: No     Comment: rare    Drug use: Not Currently     Types: Marijuana     Comment: last use        E-Cigarette/Vaping    E-Cigarette Use Current Some Day User       E-Cigarette/Vaping Substances    Nicotine Yes     THC No     CBD No     Flavoring Yes     Other No     Unknown No       I have reviewed and agree with the history as documented.     25-year-old female presents emergency department complaining of nausea vomiting.  The patient notes she has had a diagnosis of hyperemesis gravidarum and has been in urgent care once in the emergency department once for excessive vomiting.  She does not have her first OB appointment until May.  The patient last had a quantitative hCG in the 35,000 range.  She is estimated between 6 and 8 weeks pregnant.  The patient denies any lower abdominal cramping passage of fluid or blood.  Patient notes that she is just having difficulty holding things down and vomiting frequently.  Patient ran out of Zofran which was given to her in the emergency department which was helping and is now back in the hospital secondary to this.        Review of Systems   Constitutional:  Positive for activity change. Negative for chills and fever.   HENT:  Negative for ear pain and sore throat.    Eyes:  Negative for pain and visual disturbance.   Respiratory:  Negative for cough and shortness of breath.    Cardiovascular:  Negative for chest pain and palpitations.   Gastrointestinal:  Negative for abdominal pain and vomiting.   Genitourinary:  Negative for dysuria and hematuria.   Musculoskeletal:  Negative for arthralgias and back pain.   Skin:  Negative for color change and rash.   Neurological:  Negative for seizures and syncope.   All other systems reviewed and are negative.          Objective       ED Triage Vitals [04/15/25 1947]   Temperature Pulse Blood Pressure Respirations SpO2 Patient Position - Orthostatic VS   99.1 °F (37.3 °C) 74 149/67 19 99 % --      Temp src Heart Rate Source BP Location FiO2 (%) Pain Score    -- Monitor -- -- 10 - Worst Possible Pain      Vitals      Date and Time  Temp Pulse SpO2 Resp BP Pain Score FACES Pain Rating User   04/15/25 1947 99.1 °F (37.3 °C) 74 99 % 19 149/67 10 - Worst Possible Pain -- AF            Physical Exam  Vitals and nursing note reviewed.   Constitutional:       General: She is not in acute distress.     Appearance: She is well-developed.   HENT:      Head: Normocephalic and atraumatic.   Eyes:      Conjunctiva/sclera: Conjunctivae normal.   Cardiovascular:      Rate and Rhythm: Normal rate and regular rhythm.      Heart sounds: No murmur heard.  Pulmonary:      Effort: Pulmonary effort is normal. No respiratory distress.      Breath sounds: Normal breath sounds.   Abdominal:      General: There is no distension.      Palpations: Abdomen is soft.      Tenderness: There is no abdominal tenderness. There is no guarding or rebound.   Genitourinary:     Vagina: No vaginal discharge.   Musculoskeletal:         General: No swelling.      Cervical back: Neck supple.   Skin:     General: Skin is warm and dry.      Capillary Refill: Capillary refill takes less than 2 seconds.   Neurological:      Mental Status: She is alert.   Psychiatric:         Mood and Affect: Mood normal.         Results Reviewed       Procedure Component Value Units Date/Time    hCG, qualitative pregnancy [[id number]]  (Abnormal) Collected: 04/15/25 1957    Lab Status: Final result Specimen: Blood from Arm, Right Updated: 04/15/25 2047     Preg, Serum Positive    Comprehensive metabolic panel [[id number]]  (Abnormal) Collected: 04/15/25 1957    Lab Status: Final result Specimen: Blood from Arm, Right Updated: 04/15/25 2017     Sodium 135 mmol/L      Potassium 3.5 mmol/L      Chloride 103 mmol/L      CO2 21 mmol/L      ANION GAP 11 mmol/L      BUN 8 mg/dL      Creatinine 0.51 mg/dL      Glucose 89 mg/dL      Calcium 9.3 mg/dL      AST 16 U/L      ALT 9 U/L      Alkaline Phosphatase 32 U/L      Total Protein 7.2 g/dL      Albumin 4.2 g/dL      Total Bilirubin 0.38 mg/dL      eGFR 134  ml/min/1.73sq m     Narrative:      National Kidney Disease Foundation guidelines for Chronic Kidney Disease (CKD):     Stage 1 with normal or high GFR (GFR > 90 mL/min/1.73 square meters)    Stage 2 Mild CKD (GFR = 60-89 mL/min/1.73 square meters)    Stage 3A Moderate CKD (GFR = 45-59 mL/min/1.73 square meters)    Stage 3B Moderate CKD (GFR = 30-44 mL/min/1.73 square meters)    Stage 4 Severe CKD (GFR = 15-29 mL/min/1.73 square meters)    Stage 5 End Stage CKD (GFR <15 mL/min/1.73 square meters)  Note: GFR calculation is accurate only with a steady state creatinine    CBC and differential [[id number]]  (Abnormal) Collected: 04/15/25 1957    Lab Status: Final result Specimen: Blood from Arm, Right Updated: 04/15/25 2001     WBC 9.19 Thousand/uL      RBC 4.05 Million/uL      Hemoglobin 11.6 g/dL      Hematocrit 35.5 %      MCV 88 fL      MCH 28.6 pg      MCHC 32.7 g/dL      RDW 14.6 %      MPV 10.5 fL      Platelets 273 Thousands/uL      nRBC 0 /100 WBCs      Segmented % 76 %      Immature Grans % 0 %      Lymphocytes % 14 %      Monocytes % 10 %      Eosinophils Relative 0 %      Basophils Relative 0 %      Absolute Neutrophils 6.97 Thousands/µL      Absolute Immature Grans 0.04 Thousand/uL      Absolute Lymphocytes 1.25 Thousands/µL      Absolute Monocytes 0.87 Thousand/µL      Eosinophils Absolute 0.02 Thousand/µL      Basophils Absolute 0.04 Thousands/µL             No orders to display       Procedures    ED Medication and Procedure Management   Prior to Admission Medications   Prescriptions Last Dose Informant Patient Reported? Taking?   ferrous sulfate 325 (65 Fe) mg tablet   No No   Sig: Take 1 tablet (325 mg total) by mouth every other day   ondansetron (ZOFRAN) 4 mg tablet   No No   Sig: Take 1 tablet (4 mg total) by mouth every 6 (six) hours   Patient not taking: Reported on 4/1/2025   ondansetron (ZOFRAN-ODT) 4 mg disintegrating tablet   No No   Sig: Take 1 tablet (4 mg total) by mouth every 6 (six) hours  as needed for nausea or vomiting for up to 20 doses   thiamine 50 MG tablet   No No   Sig: Take 1 tablet (50 mg total) by mouth daily      Facility-Administered Medications: None     Patient's Medications   Discharge Prescriptions    ONDANSETRON (ZOFRAN-ODT) 4 MG DISINTEGRATING TABLET    Take 1 tablet (4 mg total) by mouth every 6 (six) hours as needed for nausea or vomiting for up to 12 doses       Start Date: 4/15/2025 End Date: --       Order Dose: 4 mg       Quantity: 8 tablet    Refills: 0     No discharge procedures on file.  ED SEPSIS DOCUMENTATION   Time reflects when diagnosis was documented in both MDM as applicable and the Disposition within this note       Time User Action Codes Description Comment    4/15/2025  9:26 PM Ranjit Fournier Add [O21.0] Hyperemesis gravidarum                  Ranjit Fournier Jr.,   04/15/25 2128

## 2025-04-24 ENCOUNTER — NURSE TRIAGE (OUTPATIENT)
Age: 25
End: 2025-04-24

## 2025-04-24 NOTE — TELEPHONE ENCOUNTER
Provider advised she can send refill of zofran.  Also advised ok for pt to also take B6 and Unisom as well.  Return call to patient, left non-detailed message to return call.

## 2025-04-24 NOTE — TELEPHONE ENCOUNTER
"FOLLOW UP: ESC to on call provider.  Warm transfer to  to adjust D&V since LMP was not correct.    REASON FOR CONVERSATION: Morning Sickness    SYMPTOMS: Vomiting.    OTHER: 9w4d by LMP, . Has been seen 3x in ED for same.  Had been doing well taking Zofran, however took last dose today.  She reports vomiting bile today.  Kept down 4oz of water.  Last urinated 0800.  Has not tried B6 or unisom.     DISPOSITION: Discuss with Provider and Call Back Patient (overriding See Today in Office)      Reason for Disposition   Weight loss > 5 pounds (2.5 kg) in last 2 weeks    Answer Assessment - Initial Assessment Questions  1. SEVERITY - NAUSEA: \"How bad is the nausea?\" (e.g., none; mild, moderate, severe)      Severe  2. SEVERITY - VOMITING: \"Are you vomiting?\" If Yes, ask: \"How many times have you vomited in the past 24 hours?\" (e.g., nausea only; mild, moderate or severe vomiting)      3x today has been good until today taking zofran  3. ONSET: \"When did the nausea or vomiting begin?\"       Few weeks ago  4. FLUIDS: \"What fluids or food have you vomited up today?\" \"Are you able to keep any liquids down?\"      About 1/2 water bottle, about 4oz has kept down. Vomited bile.  5. TREATMENT: \"What have you been doing so far to treat this?\"       Taking zofran  6. DEHYDRATION: \"When was the last time you urinated?\" \"Are you feeling lightheaded?\" \"Weight loss?\"      0800  7. PREGNANCY: \"How many weeks pregnant are you?\" \"How has the pregnancy been going?\"      9w4d by LMP   8. DREA: \"What date are you expecting to deliver?\"      25  9. MEDICINES: \"What medicines are you taking?\" (e.g., iron, opioid pain medicines, prenatal vitamins, vitamin B6)      Zofran  10. OTHER SYMPTOMS: \"Do you have any other symptoms?\" (e.g., diarrhea, fever)        Last BM few days ago.    Protocols used: Pregnancy - Morning Sickness (Nausea and Vomiting of Pregnancy)-Adult-[place]    "

## 2025-04-24 NOTE — TELEPHONE ENCOUNTER
Reason for Disposition  • MODERATE vomiting (e.g., 3 - 5 times / day) and present > 3 days    Protocols used: Pregnancy - Morning Sickness (Nausea and Vomiting of Pregnancy)-Adult-[place]

## 2025-04-25 DIAGNOSIS — R11.2 NAUSEA AND VOMITING: ICD-10-CM

## 2025-04-25 RX ORDER — ONDANSETRON 4 MG/1
4 TABLET, FILM COATED ORAL EVERY 8 HOURS PRN
Qty: 20 TABLET | Refills: 3 | Status: SHIPPED | OUTPATIENT
Start: 2025-04-25

## 2025-04-25 NOTE — TELEPHONE ENCOUNTER
Pt calling in returning phone call, pt was notified of the recommendations, pt verbalized understanding.     Pharmacy on file confirmed, but medication wasn't sent to pharmacy, will route to provider to review.    Pt was notified to take    Vitamin B6 (pyridoxine) - May take 50 mg at bedtime, 25 mg in the am, 25 mg in the afternoon    Unisom® (doxylamine) - May use for nausea/vomiting - take 12.5 mg every 8 hours as needed (cut a 25 mg tablet in half). May cause drowsiness.     Pt verbalized understanding. No further questions at this time.

## 2025-04-28 NOTE — TELEPHONE ENCOUNTER
Placed call to patient to notify her Zofran was sent to pharmacy, no answer, left a non detailed voice message to call back with phone number provided [phone]

## 2025-05-06 ENCOUNTER — HOSPITAL ENCOUNTER (EMERGENCY)
Facility: HOSPITAL | Age: 25
Discharge: HOME | End: 2025-05-06
Attending: EMERGENCY MEDICINE
Payer: COMMERCIAL

## 2025-05-06 VITALS
HEART RATE: 73 BPM | RESPIRATION RATE: 18 BRPM | DIASTOLIC BLOOD PRESSURE: 66 MMHG | OXYGEN SATURATION: 100 % | SYSTOLIC BLOOD PRESSURE: 120 MMHG | TEMPERATURE: 98.2 F

## 2025-05-06 DIAGNOSIS — Z3A.10 10 WEEKS GESTATION OF PREGNANCY: ICD-10-CM

## 2025-05-06 DIAGNOSIS — O21.0 HYPEREMESIS GRAVIDARUM: Primary | ICD-10-CM

## 2025-05-06 LAB
ALBUMIN SERPL BCG-MCNC: 3.9 G/DL (ref 3.5–5)
ALP SERPL-CCNC: 33 U/L (ref 34–104)
ALT SERPL W P-5'-P-CCNC: 7 U/L (ref 7–52)
ANION GAP SERPL CALCULATED.3IONS-SCNC: 9 MMOL/L (ref 4–13)
AST SERPL W P-5'-P-CCNC: 13 U/L (ref 13–39)
BASOPHILS # BLD AUTO: 0.03 THOUSANDS/ÂΜL (ref 0–0.1)
BASOPHILS NFR BLD AUTO: 0 % (ref 0–1)
BILIRUB SERPL-MCNC: 0.35 MG/DL (ref 0.2–1)
BUN SERPL-MCNC: 7 MG/DL (ref 5–25)
CALCIUM SERPL-MCNC: 9.5 MG/DL (ref 8.4–10.2)
CHLORIDE SERPL-SCNC: 103 MMOL/L (ref 96–108)
CO2 SERPL-SCNC: 22 MMOL/L (ref 21–32)
CREAT SERPL-MCNC: 0.43 MG/DL (ref 0.6–1.3)
EOSINOPHIL # BLD AUTO: 0.06 THOUSAND/ÂΜL (ref 0–0.61)
EOSINOPHIL NFR BLD AUTO: 1 % (ref 0–6)
ERYTHROCYTE [DISTWIDTH] IN BLOOD BY AUTOMATED COUNT: 13.9 % (ref 11.6–15.1)
GFR SERPL CREATININE-BSD FRML MDRD: 141 ML/MIN/1.73SQ M
GLUCOSE SERPL-MCNC: 85 MG/DL (ref 65–140)
HCT VFR BLD AUTO: 34.1 % (ref 34.8–46.1)
HGB BLD-MCNC: 11.2 G/DL (ref 11.5–15.4)
IMM GRANULOCYTES # BLD AUTO: 0.04 THOUSAND/UL (ref 0–0.2)
IMM GRANULOCYTES NFR BLD AUTO: 0 % (ref 0–2)
LIPASE SERPL-CCNC: 16 U/L (ref 11–82)
LYMPHOCYTES # BLD AUTO: 2.01 THOUSANDS/ÂΜL (ref 0.6–4.47)
LYMPHOCYTES NFR BLD AUTO: 21 % (ref 14–44)
MAGNESIUM SERPL-MCNC: 1.8 MG/DL (ref 1.9–2.7)
MCH RBC QN AUTO: 28.3 PG (ref 26.8–34.3)
MCHC RBC AUTO-ENTMCNC: 32.8 G/DL (ref 31.4–37.4)
MCV RBC AUTO: 86 FL (ref 82–98)
MONOCYTES # BLD AUTO: 0.66 THOUSAND/ÂΜL (ref 0.17–1.22)
MONOCYTES NFR BLD AUTO: 7 % (ref 4–12)
NEUTROPHILS # BLD AUTO: 6.78 THOUSANDS/ÂΜL (ref 1.85–7.62)
NEUTS SEG NFR BLD AUTO: 71 % (ref 43–75)
NRBC BLD AUTO-RTO: 0 /100 WBCS
PLATELET # BLD AUTO: 349 THOUSANDS/UL (ref 149–390)
PMV BLD AUTO: 10.1 FL (ref 8.9–12.7)
POTASSIUM SERPL-SCNC: 3.6 MMOL/L (ref 3.5–5.3)
PROT SERPL-MCNC: 7.2 G/DL (ref 6.4–8.4)
RBC # BLD AUTO: 3.96 MILLION/UL (ref 3.81–5.12)
SODIUM SERPL-SCNC: 134 MMOL/L (ref 135–147)
WBC # BLD AUTO: 9.58 THOUSAND/UL (ref 4.31–10.16)

## 2025-05-06 PROCEDURE — 85025 COMPLETE CBC W/AUTO DIFF WBC: CPT | Performed by: EMERGENCY MEDICINE

## 2025-05-06 PROCEDURE — 96374 THER/PROPH/DIAG INJ IV PUSH: CPT

## 2025-05-06 PROCEDURE — 96361 HYDRATE IV INFUSION ADD-ON: CPT

## 2025-05-06 PROCEDURE — 99283 EMERGENCY DEPT VISIT LOW MDM: CPT

## 2025-05-06 PROCEDURE — 80053 COMPREHEN METABOLIC PANEL: CPT | Performed by: EMERGENCY MEDICINE

## 2025-05-06 PROCEDURE — 36415 COLL VENOUS BLD VENIPUNCTURE: CPT | Performed by: EMERGENCY MEDICINE

## 2025-05-06 PROCEDURE — 83735 ASSAY OF MAGNESIUM: CPT | Performed by: EMERGENCY MEDICINE

## 2025-05-06 PROCEDURE — 83690 ASSAY OF LIPASE: CPT | Performed by: EMERGENCY MEDICINE

## 2025-05-06 RX ORDER — ONDANSETRON 4 MG/1
4 TABLET, ORALLY DISINTEGRATING ORAL EVERY 8 HOURS PRN
Qty: 10 TABLET | Refills: 0 | Status: SHIPPED | OUTPATIENT
Start: 2025-05-06

## 2025-05-06 RX ORDER — ONDANSETRON 2 MG/ML
4 INJECTION INTRAMUSCULAR; INTRAVENOUS ONCE
Status: COMPLETED | OUTPATIENT
Start: 2025-05-06 | End: 2025-05-06

## 2025-05-06 RX ADMIN — ONDANSETRON 4 MG: 2 INJECTION INTRAMUSCULAR; INTRAVENOUS at 06:26

## 2025-05-06 RX ADMIN — SODIUM CHLORIDE 2000 ML: 0.9 INJECTION, SOLUTION INTRAVENOUS at 06:28

## 2025-05-06 NOTE — ED CARE HANDOFF
Emergency Department Sign Out Note        Sign out and transfer of care from Dr. GARCIA. See Separate Emergency Department note.     The patient, Vannessa Birmingham, was evaluated by the previous provider for Nausea and vomiting.    Workup Completed:  Labs pending    ED Course / Workup Pending (followup):  Reassessment.        No data recorded                          ED Course as of 05/06/25 0907   Tue May 06, 2025   0659 Patient seen, evaluated, examined, chart reviewed, signout from Dr. Garcia, this is a 25-year-old female status, currently pregnant, history of hyperemesis gravidarum prior pregnancy, OB in [place] next Monday, 12th, labs resulted appear to be within normal limits with exception of mildly decreased sodium and mild decreased magnesium that do not need any intervention at this time.  There is no evidence of acidosis, soft reassuring nonfocal normal exam, no focal abdominal pain, no vaginal bleeding, no back pain, no rush of fluid out of her vaginal canal.  Currently having IV fluids infused.  Will reassess.   0710 Patient is reassessed, this is a G2, P1, at approximately 10 weeks gestation, no IUP confirmed, been seen and evaluated a couple times in the emergency department recently, has an appointment with her [facility] next week, no vaginal bleeding, vomiting in the morning, will continue to reassess after the fluid hydration has been completed, awaiting lab results.  Nontoxic-appearing, soft nontender abdominal exam, lungs are clear to auscultation, HEENT is unremarkable, no evidence of dry mucosas on membranes, appears to be relatively well-hydrated nontoxic-appearing, MILLS x 3 without rash. Neuro exam non focal.   0743 IUP confirmed bedside ultrasound, fetal heart tones in the 140s, positive fetal movement, patient is feeling better, stable for discharge.     Procedures  Medical Decision Making  Risk  Prescription drug management.            Disposition  Final diagnoses:   Hyperemesis  gravidarum   10 weeks gestation of pregnancy     Time reflects when diagnosis was documented in both MDM as applicable and the Disposition within this note       Time User Action Codes Description Comment    5/6/2025  7:33 AM Tru Peres Add [O21.0] Hyperemesis gravidarum     5/6/2025  7:45 AM Tru Peres Add [Z3A.10] 10 weeks gestation of pregnancy           ED Disposition       ED Disposition   Discharge    Condition   Stable    Date/Time   Tue May 6, 2025  7:33 AM    Comment   Vannessa Birmingham discharge to home/self care.                   Follow-up Information    None       Discharge Medication List as of 5/6/2025  7:46 AM        START taking these medications    Details   !! ondansetron (ZOFRAN-ODT) 4 mg disintegrating tablet Take 1 tablet (4 mg total) by mouth every 8 (eight) hours as needed for nausea or vomiting, Starting Tue 5/6/2025, Normal       !! - Potential duplicate medications found. Please discuss with provider.        CONTINUE these medications which have NOT CHANGED    Details   ferrous sulfate 325 (65 Fe) mg tablet Take 1 tablet (325 mg total) by mouth every other day, Starting Sun 9/8/2024, Normal      !! ondansetron (ZOFRAN) 4 mg tablet Take 1 tablet (4 mg total) by mouth every 6 (six) hours, Starting Sun 2/23/2025, Normal      !! ondansetron (ZOFRAN) 4 mg tablet Take 1 tablet (4 mg total) by mouth every 8 (eight) hours as needed for nausea or vomiting, Starting Fri 4/25/2025, Normal      !! ondansetron (ZOFRAN-ODT) 4 mg disintegrating tablet Take 1 tablet (4 mg total) by mouth every 6 (six) hours as needed for nausea or vomiting for up to 20 doses, Starting Tue 4/1/2025, Normal      !! ondansetron (ZOFRAN-ODT) 4 mg disintegrating tablet Take 1 tablet (4 mg total) by mouth every 6 (six) hours as needed for nausea or vomiting for up to 12 doses, Starting Tue 4/15/2025, Normal      thiamine 50 MG tablet Take 1 tablet (50 mg total) by mouth daily, Starting Wed 4/2/2025, Normal        !! - Potential duplicate medications found. Please discuss with provider.        No discharge procedures on file.       ED Provider  Electronically Signed by     Tru Peres III,   05/06/25 0906       Tru Peres III, DO  05/06/25 0907

## 2025-05-06 NOTE — ED PROVIDER NOTES
Time reflects when diagnosis was documented in both MDM as applicable and the Disposition within this note       Time User Action Codes Description Comment    2025  7:33 AM Tru Peres Add [O21.0] Hyperemesis gravidarum     2025  7:45 AM Tru Peres Add [Z3A.10] 10 weeks gestation of pregnancy           ED Disposition       ED Disposition   Discharge    Condition   Stable    Date/Time   Tue May 6, 2025  7:33 AM    Comment   Vannessa Birmingham discharge to home/self care.                   Assessment & Plan       Medical Decision Making  Vannessa Birmingham Presents emergency department with acute nausea and vomiting.   DDx includes but not limited to: gastritis, food poisoning, sbo, ileus, PUD, esophagitis, food bolus, colitis, and other abdominal pathology.     Will get blood work, start IV, and give medications for nausea and IVFs for hydration.     Patient with known history of hyperemesis gravidarum.  Repeat visit for the same.  Will check blood work give her some IV fluids.  Symptomatically make you feel better.  And then reassess.    Amount and/or Complexity of Data Reviewed  Labs: ordered.    Risk  Prescription drug management.             Medications   sodium chloride 0.9 % bolus 2,000 mL (0 mL Intravenous Stopped 25 0751)   ondansetron (ZOFRAN) injection 4 mg (4 mg Intravenous Given 25 0626)       ED Risk Strat Scores                    No data recorded                            History of Present Illness       Chief Complaint   Patient presents with    Vomiting     About 9 weeks pregnant, ran out of zofran, vomiting starting around 2300 last night       Past Medical History:   Diagnosis Date     2017    Anemia       Past Surgical History:   Procedure Laterality Date    WISDOM TOOTH EXTRACTION        Family History   Problem Relation Age of Onset    Heart attack Father       Social History     Tobacco Use    Smoking status: Former     Current packs/day: 0.00      Average packs/day: 0.5 packs/day for 5.0 years (2.5 ttl pk-yrs)     Types: Cigarettes     Quit date: 2022     Years since quitting: 3.3    Smokeless tobacco: Never    Tobacco comments:     Vape   Vaping Use    Vaping status: Some Days    Substances: Nicotine, Flavoring   Substance Use Topics    Alcohol use: No     Comment: rare    Drug use: Not Currently     Types: Marijuana     Comment: last use 8/30      E-Cigarette/Vaping    E-Cigarette Use Current Some Day User       E-Cigarette/Vaping Substances    Nicotine Yes     THC No     CBD No     Flavoring Yes     Other No     Unknown No       I have reviewed and agree with the history as documented.     25-year-old female presents emergency department secondary to hyperemesis gravidarum.  Patient is a G2, P1 female who has been in the emergency department before as well as urgent care for hyperemesis.    Patient with persistent vomiting more than 10 episodes in the last 4 hours.  No diarrhea.  No fever no chills.  She has a pending appointment Monday with the OB/GYN.      History provided by:  Patient   used: No    Vomiting  Associated symptoms: no abdominal pain, no arthralgias, no chills, no cough, no fever and no sore throat        Review of Systems   Constitutional:  Negative for chills and fever.   HENT:  Negative for ear pain and sore throat.    Eyes:  Negative for pain and visual disturbance.   Respiratory:  Negative for cough and shortness of breath.    Cardiovascular:  Negative for chest pain and palpitations.   Gastrointestinal:  Positive for nausea and vomiting. Negative for abdominal pain.   Genitourinary:  Negative for dysuria and hematuria.   Musculoskeletal:  Negative for arthralgias and back pain.   Skin:  Negative for color change and rash.   Neurological:  Negative for seizures and syncope.   All other systems reviewed and are negative.          Objective       ED Triage Vitals   Temperature Pulse Blood Pressure Respirations SpO2  Patient Position - Orthostatic VS   05/06/25 0618 05/06/25 0618 05/06/25 0620 05/06/25 0618 05/06/25 0618 --   98.3 °F (36.8 °C) 64 108/65 19 98 %       Temp Source Heart Rate Source BP Location FiO2 (%) Pain Score    05/06/25 0700 05/06/25 0618 -- -- 05/06/25 0618    Temporal Monitor   No Pain      Vitals      Date and Time Temp Pulse SpO2 Resp BP Pain Score FACES Pain Rating User   05/06/25 0745 98.2 °F (36.8 °C) 73 100 % 18 120/66 No Pain -- JJ   05/06/25 0700 98.3 °F (36.8 °C) 59 100 % 19 113/64 -- -- JJ   05/06/25 0620 -- -- -- -- 108/65 -- -- AF   05/06/25 0618 98.3 °F (36.8 °C) 64 98 % 19 -- No Pain -- AF            Physical Exam  Vitals and nursing note reviewed.   Constitutional:       General: She is not in acute distress.     Appearance: Normal appearance. She is well-developed. She is not ill-appearing.   HENT:      Head: Normocephalic and atraumatic.   Eyes:      Conjunctiva/sclera: Conjunctivae normal.   Cardiovascular:      Rate and Rhythm: Normal rate and regular rhythm.      Heart sounds: No murmur heard.  Pulmonary:      Effort: Pulmonary effort is normal. No respiratory distress.      Breath sounds: Normal breath sounds.   Abdominal:      Palpations: Abdomen is soft.      Tenderness: There is no abdominal tenderness.   Musculoskeletal:         General: No swelling.      Cervical back: Neck supple.   Skin:     General: Skin is warm and dry.      Capillary Refill: Capillary refill takes less than 2 seconds.      Coloration: Skin is pale.   Neurological:      General: No focal deficit present.      Mental Status: She is alert and oriented to person, place, and time.   Psychiatric:         Mood and Affect: Mood normal.         Results Reviewed       Procedure Component Value Units Date/Time    Comprehensive metabolic panel [[id number]]  (Abnormal) Collected: 05/06/25 0628    Lab Status: Final result Specimen: Blood from Arm, Right Updated: 05/06/25 0652     Sodium 134 mmol/L      Potassium 3.6 mmol/L       Chloride 103 mmol/L      CO2 22 mmol/L      ANION GAP 9 mmol/L      BUN 7 mg/dL      Creatinine 0.43 mg/dL      Glucose 85 mg/dL      Calcium 9.5 mg/dL      AST 13 U/L      ALT 7 U/L      Alkaline Phosphatase 33 U/L      Total Protein 7.2 g/dL      Albumin 3.9 g/dL      Total Bilirubin 0.35 mg/dL      eGFR 141 ml/min/1.73sq m     Narrative:      National Kidney Disease Foundation guidelines for Chronic Kidney Disease (CKD):     Stage 1 with normal or high GFR (GFR > 90 mL/min/1.73 square meters)    Stage 2 Mild CKD (GFR = 60-89 mL/min/1.73 square meters)    Stage 3A Moderate CKD (GFR = 45-59 mL/min/1.73 square meters)    Stage 3B Moderate CKD (GFR = 30-44 mL/min/1.73 square meters)    Stage 4 Severe CKD (GFR = 15-29 mL/min/1.73 square meters)    Stage 5 End Stage CKD (GFR <15 mL/min/1.73 square meters)  Note: GFR calculation is accurate only with a steady state creatinine    Magnesium [[id number]]  (Abnormal) Collected: 05/06/25 0628    Lab Status: Final result Specimen: Blood from Arm, Right Updated: 05/06/25 0652     Magnesium 1.8 mg/dL     Lipase [[id number]]  (Normal) Collected: 05/06/25 0628    Lab Status: Final result Specimen: Blood from Arm, Right Updated: 05/06/25 0652     Lipase 16 u/L     CBC and differential [[id number]]  (Abnormal) Collected: 05/06/25 0628    Lab Status: Final result Specimen: Blood from Arm, Right Updated: 05/06/25 0634     WBC 9.58 Thousand/uL      RBC 3.96 Million/uL      Hemoglobin 11.2 g/dL      Hematocrit 34.1 %      MCV 86 fL      MCH 28.3 pg      MCHC 32.8 g/dL      RDW 13.9 %      MPV 10.1 fL      Platelets 349 Thousands/uL      nRBC 0 /100 WBCs      Segmented % 71 %      Immature Grans % 0 %      Lymphocytes % 21 %      Monocytes % 7 %      Eosinophils Relative 1 %      Basophils Relative 0 %      Absolute Neutrophils 6.78 Thousands/µL      Absolute Immature Grans 0.04 Thousand/uL      Absolute Lymphocytes 2.01 Thousands/µL      Absolute Monocytes 0.66 Thousand/µL       Eosinophils Absolute 0.06 Thousand/µL      Basophils Absolute 0.03 Thousands/µL             No orders to display       Procedures    ED Medication and Procedure Management   Prior to Admission Medications   Prescriptions Last Dose Informant Patient Reported? Taking?   ferrous sulfate 325 (65 Fe) mg tablet   No No   Sig: Take 1 tablet (325 mg total) by mouth every other day   ondansetron (ZOFRAN) 4 mg tablet   No No   Sig: Take 1 tablet (4 mg total) by mouth every 6 (six) hours   Patient not taking: Reported on 4/1/2025   ondansetron (ZOFRAN) 4 mg tablet   No No   Sig: Take 1 tablet (4 mg total) by mouth every 8 (eight) hours as needed for nausea or vomiting   ondansetron (ZOFRAN-ODT) 4 mg disintegrating tablet   No No   Sig: Take 1 tablet (4 mg total) by mouth every 6 (six) hours as needed for nausea or vomiting for up to 20 doses   ondansetron (ZOFRAN-ODT) 4 mg disintegrating tablet   No No   Sig: Take 1 tablet (4 mg total) by mouth every 6 (six) hours as needed for nausea or vomiting for up to 12 doses   thiamine 50 MG tablet   No No   Sig: Take 1 tablet (50 mg total) by mouth daily      Facility-Administered Medications: None     Discharge Medication List as of 5/6/2025  7:46 AM        START taking these medications    Details   !! ondansetron (ZOFRAN-ODT) 4 mg disintegrating tablet Take 1 tablet (4 mg total) by mouth every 8 (eight) hours as needed for nausea or vomiting, Starting Tue 5/6/2025, Normal       !! - Potential duplicate medications found. Please discuss with provider.        CONTINUE these medications which have NOT CHANGED    Details   ferrous sulfate 325 (65 Fe) mg tablet Take 1 tablet (325 mg total) by mouth every other day, Starting Sun 9/8/2024, Normal      !! ondansetron (ZOFRAN) 4 mg tablet Take 1 tablet (4 mg total) by mouth every 6 (six) hours, Starting Sun 2/23/2025, Normal      !! ondansetron (ZOFRAN) 4 mg tablet Take 1 tablet (4 mg total) by mouth every 8 (eight) hours as needed for  nausea or vomiting, Starting Fri 4/25/2025, Normal      !! ondansetron (ZOFRAN-ODT) 4 mg disintegrating tablet Take 1 tablet (4 mg total) by mouth every 6 (six) hours as needed for nausea or vomiting for up to 20 doses, Starting Tue 4/1/2025, Normal      !! ondansetron (ZOFRAN-ODT) 4 mg disintegrating tablet Take 1 tablet (4 mg total) by mouth every 6 (six) hours as needed for nausea or vomiting for up to 12 doses, Starting Tue 4/15/2025, Normal      thiamine 50 MG tablet Take 1 tablet (50 mg total) by mouth daily, Starting Wed 4/2/2025, Normal       !! - Potential duplicate medications found. Please discuss with provider.        No discharge procedures on file.  ED SEPSIS DOCUMENTATION   Time reflects when diagnosis was documented in both MDM as applicable and the Disposition within this note       Time User Action Codes Description Comment    5/6/2025  7:33 AM Tru Peres [O21.0] Hyperemesis gravidarum     5/6/2025  7:45 AM Tru Peres [Z3A.10] 10 weeks gestation of pregnancy                  Ezio Garcia MD  05/06/25 7590

## 2025-05-12 ENCOUNTER — ULTRASOUND (OUTPATIENT)
Dept: OBGYN CLINIC | Facility: CLINIC | Age: 25
End: 2025-05-12
Payer: COMMERCIAL

## 2025-05-12 VITALS
WEIGHT: 99.4 LBS | DIASTOLIC BLOOD PRESSURE: 64 MMHG | SYSTOLIC BLOOD PRESSURE: 104 MMHG | BODY MASS INDEX: 17.61 KG/M2 | HEIGHT: 63 IN

## 2025-05-12 DIAGNOSIS — O21.0 HYPEREMESIS GRAVIDARUM: ICD-10-CM

## 2025-05-12 DIAGNOSIS — Z32.01 PREGNANCY TEST POSITIVE: Primary | ICD-10-CM

## 2025-05-12 PROCEDURE — 76801 OB US < 14 WKS SINGLE FETUS: CPT | Performed by: STUDENT IN AN ORGANIZED HEALTH CARE EDUCATION/TRAINING PROGRAM

## 2025-05-12 PROCEDURE — 99214 OFFICE O/P EST MOD 30 MIN: CPT | Performed by: STUDENT IN AN ORGANIZED HEALTH CARE EDUCATION/TRAINING PROGRAM

## 2025-05-12 RX ORDER — DOXYLAMINE SUCCINATE AND PYRIDOXINE HYDROCHLORIDE, DELAYED RELEASE TABLETS 10 MG/10 MG 10; 10 MG/1; MG/1
1 TABLET, DELAYED RELEASE ORAL 2 TIMES DAILY
Qty: 60 TABLET | Refills: 3 | Status: SHIPPED | OUTPATIENT
Start: 2025-05-12

## 2025-05-12 NOTE — PROGRESS NOTES
"Name: Vannessa Birmingham      : 2000      MRN: [id number]  Encounter Provider: Cassia George MD  Encounter Date: 2025   Encounter department: [facility] [place]  :  Assessment & Plan  Pregnancy test positive  - Viable pregnancy 12w1d with DREA 25 determined by LMP c/w US (measuring 11w5d).  - Continue/start prenatal vitamin  - We reviewed her current medications and discussed which are safe to continue in pregnancy  - We briefly discussed options for aneuploidy screening, to be discussed further at the prenatal intake  - Referral to [facility] for NT ultrasound and aneuploidy screening placed  - Schedule prenatal intake with RN and initial prenatal visit; prenatal labs will be ordered during the prenatal intake           Hyperemesis gravidarum    Orders:    Doxylamine-Pyridoxine 10-10 MG TBEC; Take 1 tablet by mouth 2 (two) times a day        History of Present Illness   Patient notes that this pregnancy was planned and desired.  She was not using contraception at the time of conception. She reports she is certain of her LMP and that she has regular menses. She has had no vaginal bleeding since her LMP.        Vannessa Birmingham is a 25 y.o. female who presents for dating and viability.    History obtained from: patient    Review of Systems   Constitutional:  Negative for chills and fever.   Respiratory:  Negative for cough and shortness of breath.    Cardiovascular:  Negative for chest pain and leg swelling.   Gastrointestinal:  Negative for abdominal pain, nausea and vomiting.   Genitourinary:  Negative for dysuria, frequency and urgency.   Neurological:  Negative for dizziness, light-headedness and headaches.     Medical History Reviewed by provider this encounter:     .     Objective   /64   Ht 5' 3\" (1.6 m)   Wt 45.1 kg (99 lb 6.4 oz)   LMP 2025 (Approximate)   BMI 17.61 kg/m²      Physical Exam  Constitutional:       Appearance: Normal " appearance.   HENT:      Head: Normocephalic and atraumatic.   Cardiovascular:      Rate and Rhythm: Normal rate.   Pulmonary:      Effort: Pulmonary effort is normal.   Abdominal:      General: There is no distension.      Tenderness: There is no abdominal tenderness.   Skin:     General: Skin is warm.   Neurological:      General: No focal deficit present.      Mental Status: She is alert.   Psychiatric:         Mood and Affect: Mood normal.       Pelvic Ultrasound  [place] IUP  Yolk sac: Present  Fetal Pole: Present  CRL consistent with EGA 11w5d (C/w 12w1d by LMP)  Cardiac activity: Present   bpm  No adnexal masses appreciated  DREA 11/23/2025 by LMP              Administrative Statements   I have spent a total time of 30 minutes in caring for this patient on the day of the visit/encounter including Diagnostic results, Risks and benefits of tx options, Counseling / Coordination of care, and Documenting in the medical record.    Cassia George MD  [facility]  [facility]  5/12/2025 3:53 PM

## 2025-05-14 DIAGNOSIS — Z3A.10 10 WEEKS GESTATION OF PREGNANCY: ICD-10-CM

## 2025-05-14 DIAGNOSIS — O21.0 HYPEREMESIS GRAVIDARUM: ICD-10-CM

## 2025-05-14 RX ORDER — ONDANSETRON 4 MG/1
4 TABLET, ORALLY DISINTEGRATING ORAL EVERY 8 HOURS PRN
Qty: 10 TABLET | Refills: 0 | OUTPATIENT
Start: 2025-05-14

## 2025-05-23 ENCOUNTER — ROUTINE PRENATAL (OUTPATIENT)
Dept: PERINATAL CARE | Facility: OTHER | Age: 25
End: 2025-05-23
Attending: STUDENT IN AN ORGANIZED HEALTH CARE EDUCATION/TRAINING PROGRAM
Payer: COMMERCIAL

## 2025-05-23 VITALS
WEIGHT: 104.6 LBS | HEART RATE: 85 BPM | BODY MASS INDEX: 18.54 KG/M2 | DIASTOLIC BLOOD PRESSURE: 72 MMHG | SYSTOLIC BLOOD PRESSURE: 110 MMHG | HEIGHT: 63 IN

## 2025-05-23 DIAGNOSIS — Z36.82 ENCOUNTER FOR ANTENATAL SCREENING FOR NUCHAL TRANSLUCENCY: Primary | ICD-10-CM

## 2025-05-23 DIAGNOSIS — Z3A.13 13 WEEKS GESTATION OF PREGNANCY: ICD-10-CM

## 2025-05-23 PROCEDURE — 76813 OB US NUCHAL MEAS 1 GEST: CPT

## 2025-05-23 PROCEDURE — 76801 OB US < 14 WKS SINGLE FETUS: CPT

## 2025-05-23 NOTE — PROGRESS NOTES
CONSULTATION: MATERNAL-FETAL MEDICINE  Name: Vannessa Birmingham      : 2000      MRN: [id number]  Encounter Provider: Anish Gaming MD  Encounter Date: 2025   Encounter department: [facility]  :  Assessment & Plan  13 weeks gestation of pregnancy    Orders:    [id number] PLUS Core+SCA    Encounter for  screening for nuchal translucency         Normal first trimester nuchal translucency evaluation and detailed anatomy evaluation.    Today's ultrasound findings and suggested follow-up were discussed in detail with Vannessa.  We discussed that definitive prenatal diagnosis is possible only with genetic amniocentesis or chorionic villus sampling and discussed the small procedure related risk for pregnancy loss in each case.  We discussed the option of genetic screening using cell free DNA analysis which is not diagnostic, but which has sensitivities for identification of Down syndrome, trisomy 13, trisomy 18, and sex chromosome aneuploidies of 99.9%.  Vannessa opted for genetic screening using cell free DNA analysis.  MSAFP screening is recommended between 16 and 20 weeks gestation.  She will return for detailed MFM fetal anatomy evaluation at about 20 weeks gestation.    History of Present Illness   HPI  Vannessa is a 25 y.o.  at 13w5d presenting for consultation for first trimester detailed anatomy evaluation and genetic screening.  Her early prenatal course has been complicated by hyperemesis gravidarum, currently treated with Zofran.  She denies vaginal bleeding.  A prenatal office note of May 12 was reviewed prior to the MFM encounter.    Vannessa has a history of a prior vaginal delivery at term in 2018 following an uncomplicated prenatal course.  She delivered a 7 pound baby boy, currently healthy.  Her past medical and surgical histories are otherwise unremarkable.  Daily medications include a prenatal vitamin and Zofran.  Vannessa denies tobacco, alcohol,  "marijuana, and illicit drug use during the pregnancy.  Her sister has a baby diagnosed with multiple congenital anomalies, currently admitted to [facility].  The family medical history is negative with respect of first-degree relatives with diabetes, hypertension, venous thromboembolism, or preeclampsia.  The family genetic history is otherwise negative with respect to genetic abnormalities, birth defects, and mental retardation.    Problem List[1]      Historical Information   Pertinent Medical History   Hyperemesis gravidarum           OB History    Para Term  AB Living   2 1 1   1   SAB IAB Ectopic Multiple Live Births      0 1      # Outcome Date GA Lbr Ru/2nd Weight Sex Type Anes PTL Lv   2 Current            1 Term 10/29/18 40w3d / 01:01 3175 g (7 lb) M Vag-Spont EPI N THEODORA     Past Medical History[2]  Past Surgical History[3]        Family History:  Family history was reviewed using an office screening tool, and is negative for congenital anomalies, genetic diseases, and thromboembolism in first degree relatives of this pregnancy.     Current Medications[4]  No Known Allergies    Objective   /72 (BP Location: Right arm, Patient Position: Sitting, Cuff Size: Standard)   Pulse 85   Ht 5' 3\" (1.6 m)   Wt 47.4 kg (104 lb 9.6 oz)   LMP 2025 (Approximate)   BMI 18.53 kg/m²      Physical Exam  No exam performed today,   General appearance: alert, well appearing, and in no distress and oriented to person, place, and time.  The remainder of her physical examination was deferred as she was here today for consultation and discussion.    Please refer to \"OB Procedures\" for detailed ultrasound report from today's visit.            [1]   Patient Active Problem List  Diagnosis    Anemia    History of nicotine vaping    GERD (gastroesophageal reflux disease)    Nexplanon in place   [2]   Past Medical History:  Diagnosis Date     2017    Anemia    [3]   Past Surgical History:  Procedure " Laterality Date    WISDOM TOOTH EXTRACTION     [4]   Current Outpatient Medications:     ferrous sulfate 325 (65 Fe) mg tablet, Take 1 tablet (325 mg total) by mouth every other day, Disp: 30 tablet, Rfl: 0    Prenatal Vit-Fe Fumarate-FA (PRENATAL PO), Take by mouth, Disp: , Rfl:     thiamine 50 MG tablet, Take 1 tablet (50 mg total) by mouth daily, Disp: 30 tablet, Rfl: 3    Doxylamine-Pyridoxine 10-10 MG TBEC, Take 1 tablet by mouth 2 (two) times a day (Patient not taking: Reported on 5/23/2025), Disp: 60 tablet, Rfl: 3    ondansetron (ZOFRAN) 4 mg tablet, Take 1 tablet (4 mg total) by mouth every 6 (six) hours (Patient not taking: Reported on 5/12/2025), Disp: 12 tablet, Rfl: 0    ondansetron (ZOFRAN) 4 mg tablet, Take 1 tablet (4 mg total) by mouth every 8 (eight) hours as needed for nausea or vomiting (Patient not taking: Reported on 5/23/2025), Disp: 20 tablet, Rfl: 3    ondansetron (ZOFRAN-ODT) 4 mg disintegrating tablet, Take 1 tablet (4 mg total) by mouth every 6 (six) hours as needed for nausea or vomiting for up to 20 doses, Disp: 20 tablet, Rfl: 0    ondansetron (ZOFRAN-ODT) 4 mg disintegrating tablet, Take 1 tablet (4 mg total) by mouth every 6 (six) hours as needed for nausea or vomiting for up to 12 doses, Disp: 8 tablet, Rfl: 0    ondansetron (ZOFRAN-ODT) 4 mg disintegrating tablet, Take 1 tablet (4 mg total) by mouth every 8 (eight) hours as needed for nausea or vomiting, Disp: 10 tablet, Rfl: 0

## 2025-05-23 NOTE — PROGRESS NOTES
Patient chose to have [facility] [id number] Non-Invasive Prenatal Screen [id number] PLUS w/ SCA, WITH fetal sex.  Patient choose to be billed through insurance.     Patient given brochure and is aware [facility] will contact patient's insurance and coordinate coverage.  Provided [facility] contact information. General inquiries [phone], Cost estimates [phone] and [facility] Billing [phone]. Website [email or web address].     Blood collection tubes labeled with patient identifiers (name, medical record number, and date of birth).     Filled out [facility] order form. Patient chose to have blood drawn in our office at time of visit. NIPS was drawn from left arm with a butterfly needle by LOPEZ Rey. .      If patient chose to have blood work drawn at a [facility] lab we requested patient notify MFM (via phone call or [employer] message) when blood collected so office can follow up on results.       Maternal Fetal Medicine will have results in approximately 5-7 business days and will call patient or notify via [employer].  Patient aware viewing lab result online will reveal fetal sex if ordered.    Patient verbalized understanding of all instructions and no questions at this time.

## 2025-05-23 NOTE — LETTER
May 23, 2025     Cassia George MD  [address] [place] [address]  [address] [place] [address]    Patient: Vannessa Birmingham   YOB: 2000   Date of Visit: 5/23/2025       Dear Dr. Cassia George MD:    Thank you for referring Vannessa Birmingham to me for evaluation. Below are my notes for this consultation.    If you have questions, please do not hesitate to call me. I look forward to following your patient along with you.         Sincerely,        Anish Gaming MD        CC: No Recipients

## 2025-05-28 ENCOUNTER — RESULTS FOLLOW-UP (OUTPATIENT)
Age: 25
End: 2025-05-28

## 2025-05-28 LAB
CFDNA.FET/CFDNA.TOTAL SFR FETUS: NORMAL %
CFDNA.FET/CFDNA.TOTAL SFR FETUS: NORMAL %
CITATION REF LAB TEST: NORMAL
FET 13+18+21+X+Y ANEUP PLAS.CFDNA: NEGATIVE
FET CHR 21 TS PLAS.CFDNA QL: NEGATIVE
FET CHR 21 TS PLAS.CFDNA QL: NEGATIVE
FET MS X RISK WBC.DNA+CFDNA QL: NOT DETECTED
FET SEX PLAS.CFDNA DOSAGE CFDNA: NORMAL
FET TS 13 RISK PLAS.CFDNA QL: NEGATIVE
FET X + Y ANEUP RISK PLAS.CFDNA SEQ-IMP: NOT DETECTED
GA EST FROM CONCEPTION DATE: NORMAL D
GESTATIONAL AGE > 9:: YES
LAB DIRECTOR NAME PROVIDER: NORMAL
LABORATORY COMMENT REPORT: NORMAL
LIMITATIONS OF THE TEST: NORMAL
NEGATIVE PREDICTIVE VALUE: NORMAL
PERFORMANCE CHARACTERISTICS: NORMAL
POSITIVE PREDICTIVE VALUE: NORMAL
REF LAB TEST METHOD: NORMAL
SL AMB NOTE:: NORMAL
TEST PERFORMANCE INFO SPEC: NORMAL

## 2025-05-29 ENCOUNTER — TELEPHONE (OUTPATIENT)
Dept: OBGYN CLINIC | Facility: CLINIC | Age: 25
End: 2025-05-29

## 2025-05-29 NOTE — TELEPHONE ENCOUNTER
Who called:STAFF     Is the patient Pregnant ?Yes  If so, How many weeks? 14wks    Reason for the Call:Reschedule Intake - virtual    Action Taken:Spoke to patient      Outcome/Plan/ Recommendations:  Rescheduled on 5/30, patient is aware the appointment is virtual.

## 2025-05-29 NOTE — TELEPHONE ENCOUNTER
"----- Message from Roly BUTTERFIELD sent at 5/29/2025 11:10 AM EDT -----  Regarding: OB Intake  Hello,Patient had OB intake scheduled for tomorrow that looks like it was cancelled. Please reach out to patient to reschedule. Note states lack of transportation but OB intake was virtual phone call. Please make sure when scheduling OB intakes virtually, patients are notified that the visit will not appear as a \"virtual\" on their appointment desk but they will get a call from the nurse. Thank you.  "

## 2025-05-30 ENCOUNTER — INITIAL PRENATAL (OUTPATIENT)
Dept: OBGYN CLINIC | Facility: MEDICAL CENTER | Age: 25
End: 2025-05-30

## 2025-05-30 DIAGNOSIS — Z34.82 PRENATAL CARE, SUBSEQUENT PREGNANCY IN SECOND TRIMESTER: Primary | ICD-10-CM

## 2025-05-30 PROCEDURE — OBC

## 2025-05-30 NOTE — PROGRESS NOTES
Telemedicine Consent  The patient was identified by name and date of birth and was informed that this is a virtual visit being conducted through a secure, HIPAA-complaint platform. I am in a private office space with the door closed. Patient acknowledged understanding of privacy.  She agrees to proceed and is aware that she may discontinue the visit at any time.    OB INTAKE INTERVIEW May 30, 2025    Patient is 25 y.o. who presents for OB intake at 14w5d.  The father of her baby (Neil) is involved in the pregnancy.      Patient's last menstrual period was 2025 (exact date).  Ultrasound: Measured 11 weeks 5 days on   Estimated Date of Delivery: 25 confirmed by dating ultrasound.    Signs/Symptoms of Pregnancy  Current pregnancy symptoms: fatigue, nausea, and vomiting  Constipation no  Headaches no  Cramping-mild/spotting no  PICA cravings no    Diabetes  If patient has 1 or more, please order early 1 hour GTT  History of GDM no  BMI >35 no  History of PCOS or current metformin use no  History of LGA/macrosomic infant (4000g/9lbs) no    If patient has 2 or more, please order early 1 hour GTT  BMI>30 no  AMA no  First degree relative with type 2 diabetes no  History of chronic HTN, hyperlipidemia, elevated A1C no  High risk race (, , ,  or ) no    Hypertension  History of of chronic HTN no  History of gestational HTN no  History of preeclampsia, eclampsia, or HELLP syndrome no  History of diabetes no  History of lupus, sjogrens syndrome, kidney disease no    Thyroid  History of thyroid disease no    Bleeding Disorder or Hx of DVT: (Factor V, antithrombin III, prothrombin gene mutation, protein C and S Ag, lupus anticoagulant, anticardiolipin, beta-2 glycoprotein): no    OB/GYN:  History of abnormal pap smear no       Date of last pap smear 2025  History of HPV no  History of Herpes/HSV no  History of other STI (gonorrhea,  chlamydia, trich) no  History of prior  yes  History of prior  no  History of  delivery prior to 36 weeks 6 days no  History of blood transfusion no  Ok for blood transfusion yes    Substance screening:  History of tobacco use no  Currently using tobacco no  Substance Use Screen Level - no risk    MRSA Screening:   Does the pt have a hx of MRSA? no    Immunizations:  History of Varicella Vaccination   Influenza vaccine given this season NO   Discussed Tdap vaccine YES   COVID Vaccine NO - Declined    Genetic/MFM:  Do you or your partner have a history of any of the following in yourselves or first degree relatives?  Cystic fibrosis no  Spinal muscular atrophy no  Hemoglobinopathy/Sickle Cell/Thalassemia no  Fragile X Intellectual Disability no    Appointment for Nuchal Translucency Ultrasound at [facility] was done on .    Interview education  [facility] Pregnancy Essentials Book reviewed, discussed and attached to their AVS YES     Prenatal lab work scripts YES      Aspirin/Preeclampsia Screen    Risk Level Risk Factor Recommendation   LOW Prior Uncomplicated full-term delivery yes No Aspirin recommendation        MODERATE Nulliparity no Recommend low-dose aspirin if     BMI>30 no 2 or more moderate risk factors    Family History Preeclampsia (mother/sister) no     35yr old or greater no     Black Race, Concern for SDOH/Low Socioeconomic no     IVF Pregnancy  no     Personal History Risks (low birth weight, prior adverse preg outcome, >10yr preg interval) no         HIGH History of Preeclampsia no Recommend low-dose aspirin if     Multifetal gestation no 1 or more high risk factors    Chronic HTN no     Type 1 or 2 Diabetes no     Renal Disease no     Autoimmune Disease  no      Contraindications to ASA therapy:  NSAID/ ASA allergy: no  Nasal polyps: no  Asthma with history of ASA induced bronchospasm: no  Relative contraindications:  History of GI bleed: no  Active peptic ulcer disease:  no  Severe hepatic dysfunction: no    Patient does not meet recommendation to take ASA 162mg during this pregnancy from 12-36wks to lower her risk of preeclampsia.      The patient has a history now or in prior pregnancy notable for:  in 2018.    Details that I feel the provider should be aware of: Vannessa came in for her OB intake at 14w5d. Patient c/o N/V, fatigue and mild cramping. Safe medications to take during pregnancy and warning signs reviewed. Patient with h/o  in 2018. Bleeding precautions reviewed due to patient's Rh negative status. Prenatal panel ordered. Patient had NT  and has her level ll scheduled 7/15.    PN1 visit scheduled. The patient was oriented to our practice, the navigator role, reviewed delivering physicians and [facility] for delivery. All questions were answered.    Interviewed by: Roly BUTTERFIELD RN

## 2025-06-06 DIAGNOSIS — R11.2 NAUSEA AND VOMITING: ICD-10-CM

## 2025-06-06 RX ORDER — ONDANSETRON 4 MG/1
4 TABLET, FILM COATED ORAL EVERY 8 HOURS PRN
Qty: 20 TABLET | Refills: 5 | Status: SHIPPED | OUTPATIENT
Start: 2025-06-06

## 2025-06-07 ENCOUNTER — APPOINTMENT (OUTPATIENT)
Dept: LAB | Facility: HOSPITAL | Age: 25
End: 2025-06-07
Payer: COMMERCIAL

## 2025-06-07 DIAGNOSIS — Z34.82 PRENATAL CARE, SUBSEQUENT PREGNANCY IN SECOND TRIMESTER: ICD-10-CM

## 2025-06-07 LAB
ABO GROUP BLD: NORMAL
BACTERIA UR QL AUTO: ABNORMAL /HPF
BASOPHILS # BLD AUTO: 0.03 THOUSANDS/ÂΜL (ref 0–0.1)
BASOPHILS NFR BLD AUTO: 0 % (ref 0–1)
BILIRUB UR QL STRIP: NEGATIVE
BLD GP AB SCN SERPL QL: NEGATIVE
CLARITY UR: ABNORMAL
COLOR UR: YELLOW
EOSINOPHIL # BLD AUTO: 0.03 THOUSAND/ÂΜL (ref 0–0.61)
EOSINOPHIL NFR BLD AUTO: 0 % (ref 0–6)
ERYTHROCYTE [DISTWIDTH] IN BLOOD BY AUTOMATED COUNT: 14.6 % (ref 11.6–15.1)
GLUCOSE UR STRIP-MCNC: NEGATIVE MG/DL
HCT VFR BLD AUTO: 34.6 % (ref 34.8–46.1)
HGB BLD-MCNC: 11.3 G/DL (ref 11.5–15.4)
HGB UR QL STRIP.AUTO: NEGATIVE
IMM GRANULOCYTES # BLD AUTO: 0.03 THOUSAND/UL (ref 0–0.2)
IMM GRANULOCYTES NFR BLD AUTO: 0 % (ref 0–2)
KETONES UR STRIP-MCNC: ABNORMAL MG/DL
LEUKOCYTE ESTERASE UR QL STRIP: ABNORMAL
LYMPHOCYTES # BLD AUTO: 2.19 THOUSANDS/ÂΜL (ref 0.6–4.47)
LYMPHOCYTES NFR BLD AUTO: 26 % (ref 14–44)
MCH RBC QN AUTO: 28.7 PG (ref 26.8–34.3)
MCHC RBC AUTO-ENTMCNC: 32.7 G/DL (ref 31.4–37.4)
MCV RBC AUTO: 88 FL (ref 82–98)
MONOCYTES # BLD AUTO: 0.49 THOUSAND/ÂΜL (ref 0.17–1.22)
MONOCYTES NFR BLD AUTO: 6 % (ref 4–12)
MUCOUS THREADS UR QL AUTO: ABNORMAL
NEUTROPHILS # BLD AUTO: 5.66 THOUSANDS/ÂΜL (ref 1.85–7.62)
NEUTS SEG NFR BLD AUTO: 68 % (ref 43–75)
NITRITE UR QL STRIP: NEGATIVE
NON-SQ EPI CELLS URNS QL MICRO: ABNORMAL /HPF
NRBC BLD AUTO-RTO: 0 /100 WBCS
PH UR STRIP.AUTO: 7 [PH]
PLATELET # BLD AUTO: 276 THOUSANDS/UL (ref 149–390)
PMV BLD AUTO: 10 FL (ref 8.9–12.7)
PROT UR STRIP-MCNC: ABNORMAL MG/DL
RBC # BLD AUTO: 3.94 MILLION/UL (ref 3.81–5.12)
RBC #/AREA URNS AUTO: ABNORMAL /HPF
RH BLD: NEGATIVE
RUBV IGG SERPL IA-ACNC: 49.4 IU/ML
SP GR UR STRIP.AUTO: 1.02 (ref 1–1.03)
SPECIMEN EXPIRATION DATE: NORMAL
UROBILINOGEN UR STRIP-ACNC: <2 MG/DL
WBC # BLD AUTO: 8.43 THOUSAND/UL (ref 4.31–10.16)
WBC #/AREA URNS AUTO: ABNORMAL /HPF

## 2025-06-07 PROCEDURE — 87340 HEPATITIS B SURFACE AG IA: CPT

## 2025-06-07 PROCEDURE — 36415 COLL VENOUS BLD VENIPUNCTURE: CPT

## 2025-06-07 PROCEDURE — 85025 COMPLETE CBC W/AUTO DIFF WBC: CPT

## 2025-06-07 PROCEDURE — 86780 TREPONEMA PALLIDUM: CPT

## 2025-06-07 PROCEDURE — 81001 URINALYSIS AUTO W/SCOPE: CPT

## 2025-06-07 PROCEDURE — 86762 RUBELLA ANTIBODY: CPT

## 2025-06-07 PROCEDURE — 87389 HIV-1 AG W/HIV-1&-2 AB AG IA: CPT

## 2025-06-07 PROCEDURE — 83020 HEMOGLOBIN ELECTROPHORESIS: CPT

## 2025-06-07 PROCEDURE — 86803 HEPATITIS C AB TEST: CPT

## 2025-06-07 PROCEDURE — 86900 BLOOD TYPING SEROLOGIC ABO: CPT

## 2025-06-07 PROCEDURE — 87086 URINE CULTURE/COLONY COUNT: CPT

## 2025-06-07 PROCEDURE — 86706 HEP B SURFACE ANTIBODY: CPT

## 2025-06-07 PROCEDURE — 86901 BLOOD TYPING SEROLOGIC RH(D): CPT

## 2025-06-07 PROCEDURE — 86850 RBC ANTIBODY SCREEN: CPT

## 2025-06-08 LAB
BACTERIA UR CULT: NORMAL
HBV SURFACE AB SER-ACNC: 7.99 MIU/ML
HBV SURFACE AG SER QL: NORMAL
HCV AB SER QL: NORMAL
HIV 1+2 AB+HIV1 P24 AG SERPL QL IA: NORMAL
TREPONEMA PALLIDUM IGG+IGM AB [PRESENCE] IN SERUM OR PLASMA BY IMMUNOASSAY: NORMAL

## 2025-06-09 ENCOUNTER — RESULTS FOLLOW-UP (OUTPATIENT)
Dept: OBGYN CLINIC | Facility: MEDICAL CENTER | Age: 25
End: 2025-06-09

## 2025-06-11 LAB
HGB A MFR BLD: 2.6 % (ref 1.8–3.2)
HGB A MFR BLD: 97.4 % (ref 96.4–98.8)
HGB F MFR BLD: 0 % (ref 0–2)
HGB FRACT BLD-IMP: NORMAL
HGB S MFR BLD: 0 %

## 2025-06-15 DIAGNOSIS — R11.2 NAUSEA AND VOMITING: ICD-10-CM

## 2025-06-17 RX ORDER — ONDANSETRON 4 MG/1
4 TABLET, FILM COATED ORAL EVERY 8 HOURS PRN
Qty: 20 TABLET | Refills: 0 | Status: SHIPPED | OUTPATIENT
Start: 2025-06-17

## 2025-06-19 ENCOUNTER — APPOINTMENT (EMERGENCY)
Dept: RADIOLOGY | Facility: HOSPITAL | Age: 25
End: 2025-06-19
Payer: COMMERCIAL

## 2025-06-19 ENCOUNTER — HOSPITAL ENCOUNTER (EMERGENCY)
Facility: HOSPITAL | Age: 25
Discharge: HOME | End: 2025-06-19
Attending: EMERGENCY MEDICINE
Payer: COMMERCIAL

## 2025-06-19 VITALS
RESPIRATION RATE: 21 BRPM | SYSTOLIC BLOOD PRESSURE: 110 MMHG | HEART RATE: 76 BPM | OXYGEN SATURATION: 97 % | DIASTOLIC BLOOD PRESSURE: 66 MMHG | TEMPERATURE: 97.6 F

## 2025-06-19 DIAGNOSIS — R79.0 LOW MAGNESIUM LEVEL: ICD-10-CM

## 2025-06-19 DIAGNOSIS — J18.9 LEFT LOWER LOBE PNEUMONIA: Primary | ICD-10-CM

## 2025-06-19 DIAGNOSIS — Z34.92 SECOND TRIMESTER PREGNANCY: ICD-10-CM

## 2025-06-19 DIAGNOSIS — E87.6 LOW BLOOD POTASSIUM: ICD-10-CM

## 2025-06-19 LAB
ALBUMIN SERPL BCG-MCNC: 3.8 G/DL (ref 3.5–5)
ALP SERPL-CCNC: 57 U/L (ref 34–104)
ALT SERPL W P-5'-P-CCNC: 7 U/L (ref 7–52)
ANION GAP SERPL CALCULATED.3IONS-SCNC: 11 MMOL/L (ref 4–13)
AST SERPL W P-5'-P-CCNC: 12 U/L (ref 13–39)
BASOPHILS # BLD AUTO: 0.03 THOUSANDS/ÂΜL (ref 0–0.1)
BASOPHILS NFR BLD AUTO: 0 % (ref 0–1)
BILIRUB SERPL-MCNC: 0.28 MG/DL (ref 0.2–1)
BUN SERPL-MCNC: 5 MG/DL (ref 5–25)
CALCIUM SERPL-MCNC: 9 MG/DL (ref 8.4–10.2)
CHLORIDE SERPL-SCNC: 102 MMOL/L (ref 96–108)
CO2 SERPL-SCNC: 22 MMOL/L (ref 21–32)
CREAT SERPL-MCNC: 0.43 MG/DL (ref 0.6–1.3)
EOSINOPHIL # BLD AUTO: 0.05 THOUSAND/ÂΜL (ref 0–0.61)
EOSINOPHIL NFR BLD AUTO: 0 % (ref 0–6)
ERYTHROCYTE [DISTWIDTH] IN BLOOD BY AUTOMATED COUNT: 15 % (ref 11.6–15.1)
FLUAV RNA RESP QL NAA+PROBE: NEGATIVE
FLUBV RNA RESP QL NAA+PROBE: NEGATIVE
GFR SERPL CREATININE-BSD FRML MDRD: 141 ML/MIN/1.73SQ M
GLUCOSE SERPL-MCNC: 82 MG/DL (ref 65–140)
HCT VFR BLD AUTO: 33.8 % (ref 34.8–46.1)
HGB BLD-MCNC: 10.7 G/DL (ref 11.5–15.4)
IMM GRANULOCYTES # BLD AUTO: 0.05 THOUSAND/UL (ref 0–0.2)
IMM GRANULOCYTES NFR BLD AUTO: 0 % (ref 0–2)
LYMPHOCYTES # BLD AUTO: 2.3 THOUSANDS/ÂΜL (ref 0.6–4.47)
LYMPHOCYTES NFR BLD AUTO: 18 % (ref 14–44)
MAGNESIUM SERPL-MCNC: 1.8 MG/DL (ref 1.9–2.7)
MCH RBC QN AUTO: 27.8 PG (ref 26.8–34.3)
MCHC RBC AUTO-ENTMCNC: 31.7 G/DL (ref 31.4–37.4)
MCV RBC AUTO: 88 FL (ref 82–98)
MONOCYTES # BLD AUTO: 1.08 THOUSAND/ÂΜL (ref 0.17–1.22)
MONOCYTES NFR BLD AUTO: 9 % (ref 4–12)
NEUTROPHILS # BLD AUTO: 9.12 THOUSANDS/ÂΜL (ref 1.85–7.62)
NEUTS SEG NFR BLD AUTO: 73 % (ref 43–75)
NRBC BLD AUTO-RTO: 0 /100 WBCS
PLATELET # BLD AUTO: 318 THOUSANDS/UL (ref 149–390)
PMV BLD AUTO: 10.3 FL (ref 8.9–12.7)
POTASSIUM SERPL-SCNC: 2.9 MMOL/L (ref 3.5–5.3)
PROT SERPL-MCNC: 7.1 G/DL (ref 6.4–8.4)
RBC # BLD AUTO: 3.85 MILLION/UL (ref 3.81–5.12)
RSV RNA RESP QL NAA+PROBE: NEGATIVE
SARS-COV-2 RNA RESP QL NAA+PROBE: NEGATIVE
SODIUM SERPL-SCNC: 135 MMOL/L (ref 135–147)
WBC # BLD AUTO: 12.63 THOUSAND/UL (ref 4.31–10.16)

## 2025-06-19 PROCEDURE — 96365 THER/PROPH/DIAG IV INF INIT: CPT

## 2025-06-19 PROCEDURE — 96375 TX/PRO/DX INJ NEW DRUG ADDON: CPT

## 2025-06-19 PROCEDURE — 36415 COLL VENOUS BLD VENIPUNCTURE: CPT | Performed by: EMERGENCY MEDICINE

## 2025-06-19 PROCEDURE — 99285 EMERGENCY DEPT VISIT HI MDM: CPT

## 2025-06-19 PROCEDURE — 83735 ASSAY OF MAGNESIUM: CPT | Performed by: EMERGENCY MEDICINE

## 2025-06-19 PROCEDURE — 85025 COMPLETE CBC W/AUTO DIFF WBC: CPT | Performed by: EMERGENCY MEDICINE

## 2025-06-19 PROCEDURE — 80053 COMPREHEN METABOLIC PANEL: CPT | Performed by: EMERGENCY MEDICINE

## 2025-06-19 PROCEDURE — 0241U HB NFCT DS VIR RESP RNA 4 TRGT: CPT | Performed by: EMERGENCY MEDICINE

## 2025-06-19 PROCEDURE — 94640 AIRWAY INHALATION TREATMENT: CPT

## 2025-06-19 PROCEDURE — 99285 EMERGENCY DEPT VISIT HI MDM: CPT | Performed by: EMERGENCY MEDICINE

## 2025-06-19 PROCEDURE — 71045 X-RAY EXAM CHEST 1 VIEW: CPT

## 2025-06-19 PROCEDURE — 93005 ELECTROCARDIOGRAM TRACING: CPT

## 2025-06-19 RX ORDER — POTASSIUM CHLORIDE 1500 MG/1
60 TABLET, EXTENDED RELEASE ORAL ONCE
Status: COMPLETED | OUTPATIENT
Start: 2025-06-19 | End: 2025-06-19

## 2025-06-19 RX ORDER — MAGNESIUM OXIDE 400 MG/1
400 TABLET ORAL DAILY
Qty: 20 TABLET | Refills: 0 | Status: SHIPPED | OUTPATIENT
Start: 2025-06-19 | End: 2025-07-09

## 2025-06-19 RX ORDER — IPRATROPIUM BROMIDE AND ALBUTEROL SULFATE 2.5; .5 MG/3ML; MG/3ML
3 SOLUTION RESPIRATORY (INHALATION) ONCE
Status: COMPLETED | OUTPATIENT
Start: 2025-06-19 | End: 2025-06-19

## 2025-06-19 RX ORDER — POTASSIUM CHLORIDE 1500 MG/1
20 TABLET, EXTENDED RELEASE ORAL 2 TIMES DAILY
Qty: 20 TABLET | Refills: 0 | Status: SHIPPED | OUTPATIENT
Start: 2025-06-19

## 2025-06-19 RX ORDER — ACETAMINOPHEN 10 MG/ML
1000 INJECTION, SOLUTION INTRAVENOUS ONCE
Status: COMPLETED | OUTPATIENT
Start: 2025-06-19 | End: 2025-06-19

## 2025-06-19 RX ORDER — AZITHROMYCIN 250 MG/1
250 TABLET, FILM COATED ORAL DAILY
Qty: 4 TABLET | Refills: 0 | Status: SHIPPED | OUTPATIENT
Start: 2025-06-19 | End: 2025-06-23

## 2025-06-19 RX ORDER — MAGNESIUM SULFATE HEPTAHYDRATE 40 MG/ML
2 INJECTION, SOLUTION INTRAVENOUS ONCE
Status: COMPLETED | OUTPATIENT
Start: 2025-06-19 | End: 2025-06-19

## 2025-06-19 RX ORDER — AZITHROMYCIN 250 MG/1
500 TABLET, FILM COATED ORAL ONCE
Status: COMPLETED | OUTPATIENT
Start: 2025-06-19 | End: 2025-06-19

## 2025-06-19 RX ORDER — ALBUTEROL SULFATE 90 UG/1
2 INHALANT RESPIRATORY (INHALATION) EVERY 4 HOURS PRN
Qty: 8 G | Refills: 0 | Status: SHIPPED | OUTPATIENT
Start: 2025-06-19 | End: 2026-06-19

## 2025-06-19 RX ADMIN — AMOXICILLIN AND CLAVULANATE POTASSIUM 1 TABLET: 875; 125 TABLET, FILM COATED ORAL at 20:08

## 2025-06-19 RX ADMIN — MAGNESIUM SULFATE HEPTAHYDRATE 2 G: 40 INJECTION, SOLUTION INTRAVENOUS at 20:07

## 2025-06-19 RX ADMIN — ACETAMINOPHEN 1000 MG: 10 INJECTION INTRAVENOUS at 18:28

## 2025-06-19 RX ADMIN — AZITHROMYCIN 500 MG: 250 TABLET, FILM COATED ORAL at 20:08

## 2025-06-19 RX ADMIN — IPRATROPIUM BROMIDE AND ALBUTEROL SULFATE 3 ML: 2.5; .5 SOLUTION RESPIRATORY (INHALATION) at 20:10

## 2025-06-19 RX ADMIN — POTASSIUM CHLORIDE 60 MEQ: 1500 TABLET, EXTENDED RELEASE ORAL at 20:08

## 2025-06-19 NOTE — ED PROVIDER NOTES
Time reflects when diagnosis was documented in both MDM as applicable and the Disposition within this note       Time User Action Codes Description Comment    2025 10:08 PM Sheela Varma [J18.9] Left lower lobe pneumonia     2025 10:21 PM Sheela Varma Add [E87.6] Low blood potassium     2025 10:21 PM Sheela Varma Add [R79.0] Low magnesium level     2025 10:25 PM Sheela Varma Add [Z34.92] Second trimester pregnancy           ED Disposition       ED Disposition   Discharge    Condition   Stable    Date/Time   u 2025 10:07 PM    Comment   Vannessa Birmingham discharge to home/self care.                   Assessment & Plan       Medical Decision Making  25-year-old female -0-1-1 with last menstrual period of 2025 EDC of 2025 placing her at 17 weeks 4 days blood type is a negative followed by Dr. George of [facility] patient has had upper respiratory and congestion symptoms for the last several days worse prior to arrival she is complaining of pain with respirations and ongoing cough on physical exam sats are normal however patient has diffuse rhonchi throughout the left chest go chest x-ray to evaluate for pneumonia less likely pneumothorax will arrhythmia strain pattern on EKG assess for COVID influenza RSV and reevaluate administer DuoNeb to see if she symptomatically improves.  Obtain fetal heart tones at this time she is not experiencing any abdominal pain loss of fluid vaginal bleeding or discharge.    Will administer IV tylenol for pain;  Will cover pneumonia with both Augmentin and azithromycin for atypical coverage    Amount and/or Complexity of Data Reviewed  Labs: ordered.  Radiology: ordered and independent interpretation performed.    Risk  OTC drugs.  Prescription drug management.        ED Course as of 25 0229   Thu 2025   1912    2224 Patient feels improved after neb will discharge on MDI        Medications   acetaminophen (Ofirmev) injection 1,000 mg (0 mg Intravenous Stopped 25 1843)   magnesium sulfate 2 g/50 mL IVPB (premix) 2 g (0 g Intravenous Stopped 25)   potassium chloride (Klor-Con M20) CR tablet 60 mEq (60 mEq Oral Given 25)   ipratropium-albuterol (DUO-NEB) 0.5-2.5 mg/3 mL inhalation solution 3 mL (3 mL Nebulization Given 25)   amoxicillin-clavulanate (AUGMENTIN) 875-125 mg per tablet 1 tablet (1 tablet Oral Given 25)   azithromycin (ZITHROMAX) tablet 500 mg (500 mg Oral Given 25)       ED Risk Strat Scores                    No data recorded        SBIRT 20yo+      Flowsheet Row Most Recent Value   Initial Alcohol Screen: US AUDIT-C     1. How often do you have a drink containing alcohol? 0 Filed at: 2025   2. How many drinks containing alcohol do you have on a typical day you are drinking?  0 Filed at: 2025   3b. FEMALE Any Age, or MALE 65+: How often do you have 4 or more drinks on one occassion? 0 Filed at: 2025   Audit-C Score 0 Filed at: 2025   KARISSA: How many times in the past year have you...    Used an illegal drug or used a prescription medication for non-medical reasons? Never Filed at: 2025                            History of Present Illness       Chief Complaint   Patient presents with    Chest Pain     Patient c/o L sided epigastric pain radiating upon through chest and into shoulder beginning an hour ago; patient is currently 17 weeks pregnant       Past Medical History[1]   Past Surgical History[2]   Family History[3]   Social History[4]   E-Cigarette/Vaping    E-Cigarette Use Former User       E-Cigarette/Vaping Substances    Nicotine No     THC No     CBD No     Flavoring No     Other No     Unknown No       I have reviewed and agree with the history as documented.     25-year-old female -0-1-1 with last menstrual period of 2025 EDC of 2025 blood  type is A NEG she is followed by Dr. George of [facility] [place].  Patient has had prior visits for hyperemesis gravidarum but currently her nausea and vomiting have been under good control with Zofran patient complains of upper respiratory and congestion symptoms over the last several days and 2 hours prior to arrival her she is having increasing pain with coughing and feels short of breath she is denying any wheezing she has no heartburn denies any abdominal pain there is no vaginal bleeding or discharge no loss of fluids no dysuria or increased urinary frequency no lower extremity edema no DVT or no history of DVT or PE.  Last medical history GERD hyperemesis gravidarum past surgical history is wisdom tooth extraction        Review of Systems   Constitutional:  Positive for activity change. Negative for appetite change, chills and fever.   HENT:  Positive for congestion. Negative for ear pain, rhinorrhea, sneezing, sore throat and trouble swallowing.    Eyes:  Negative for discharge.   Respiratory:  Positive for cough and shortness of breath.    Cardiovascular:  Positive for chest pain. Negative for leg swelling.   Gastrointestinal:  Negative for abdominal pain, blood in stool, diarrhea, nausea and vomiting.   Endocrine: Negative for polyuria.   Genitourinary:  Negative for difficulty urinating, dysuria, frequency and urgency.   Musculoskeletal:  Negative for back pain and myalgias.   Skin:  Negative for rash.   Neurological:  Negative for dizziness, weakness, light-headedness, numbness and headaches.   Hematological:  Negative for adenopathy.   Psychiatric/Behavioral:  Negative for confusion.    All other systems reviewed and are negative.          Objective       ED Triage Vitals [06/19/25 1725]   Temperature Pulse Blood Pressure Respirations SpO2 Patient Position - Orthostatic VS   97.6 °F (36.4 °C) 77 136/86 14 100 % --      Temp Source Heart Rate Source BP Location FiO2 (%) Pain Score     Temporal Monitor -- -- 6      Vitals      Date and Time Temp Pulse SpO2 Resp BP Pain Score FACES Pain Rating User   06/19/25 2230 -- 76 97 % 21 110/66 -- -- AM   06/19/25 2200 -- 72 96 % 20 106/60 -- --    06/19/25 2130 -- 70 98 % 20 108/63 -- --    06/19/25 2100 -- 80 94 % 20 116/71 -- --    06/19/25 2030 -- 74 97 % 20 115/71 -- --    06/2000 -- 104 96 % 20 115/64 -- --    06/19/25 1930 -- 81 97 % 20 111/63 -- -- MS   06/19/25 1900 -- 82 97 % 19 103/62 -- --    06/19/25 1800 -- 82 98 % 20 111/69 -- --    06/19/25 1730 -- 81 98 % 19 118/77 -- --    06/19/25 1725 97.6 °F (36.4 °C) 77 100 % 14 136/86 6 -- JL            Physical Exam  Vitals and nursing note reviewed.   Constitutional:       General: She is in acute distress.      Appearance: She is not toxic-appearing.   HENT:      Right Ear: Tympanic membrane, ear canal and external ear normal.      Left Ear: Tympanic membrane, ear canal and external ear normal.      Nose: Nose normal. No congestion or rhinorrhea.      Mouth/Throat:      Mouth: Mucous membranes are moist.      Pharynx: No oropharyngeal exudate or posterior oropharyngeal erythema.     Eyes:      General:         Right eye: No discharge.         Left eye: No discharge.      Conjunctiva/sclera: Conjunctivae normal.      Pupils: Pupils are equal, round, and reactive to light.       Cardiovascular:      Rate and Rhythm: Normal rate and regular rhythm.      Pulses: Normal pulses.   Pulmonary:      Effort: Respiratory distress (mild tachypnea) present.      Breath sounds: Rhonchi present. No wheezing.      Comments: Sats 97% on RA  Chest:      Chest wall: No tenderness.   Abdominal:      General: There is no distension.      Palpations: Abdomen is soft.      Tenderness: There is no abdominal tenderness. There is no right CVA tenderness, left CVA tenderness or guarding.     Musculoskeletal:         General: Normal range of motion.      Cervical back: Normal range of motion and neck  supple. No rigidity or tenderness.      Right lower leg: No edema.      Left lower leg: No edema.   Lymphadenopathy:      Cervical: No cervical adenopathy.     Skin:     General: Skin is warm and dry.      Capillary Refill: Capillary refill takes less than 2 seconds.     Neurological:      General: No focal deficit present.      Mental Status: She is alert and oriented to person, place, and time.      Cranial Nerves: No cranial nerve deficit.      Sensory: No sensory deficit.      Motor: No weakness.      Coordination: Coordination normal.      Gait: Gait normal.     Psychiatric:         Mood and Affect: Mood normal.         Results Reviewed       Procedure Component Value Units Date/Time    FLU/RSV/COVID - if FLU/RSV clinically relevant (2hr TAT) [[id number]]  (Normal) Collected: 06/19/25 1829    Lab Status: Final result Specimen: Nares from Nose Updated: 06/19/25 1920     SARS-CoV-2 Negative     INFLUENZA A PCR Negative     INFLUENZA B PCR Negative     RSV PCR Negative    Narrative:      This test has been performed using the CoV-2/Flu/RSV plus assay on the [employer] GeneXpert platform. This test has been validated by the  and verified by the performing laboratory.     This test is designed to amplify and detect the following: nucleocapsid (N), envelope (E), and RNA-dependent RNA polymerase (RdRP) genes of the SARS-CoV-2 genome; matrix (M), basic polymerase (PB2), and acidic protein (PA) segments of the influenza A genome; matrix (M) and non-structural protein (NS) segments of the influenza B genome, and the nucleocapsid genes of RSV A and RSV B.     Positive results are indicative of the presence of Flu A, Flu B, RSV, and/or SARS-CoV-2 RNA. Positive results for SARS-CoV-2 or suspected novel influenza should be reported to state, local, or federal health departments according to local reporting requirements.      All results should be assessed in conjunction with clinical presentation and other laboratory  markers for clinical management.     FOR PEDIATRIC PATIENTS - copy/paste COVID Guidelines URL to browser: [email or web address]       Magnesium [[id number]]  (Abnormal) Collected: 06/19/25 1829    Lab Status: Final result Specimen: Blood from Arm, Right Updated: 06/19/25 1915     Magnesium 1.8 mg/dL     Comprehensive metabolic panel [[id number]]  (Abnormal) Collected: 06/19/25 1829    Lab Status: Final result Specimen: Blood from Arm, Right Updated: 06/19/25 1853     Sodium 135 mmol/L      Potassium 2.9 mmol/L      Chloride 102 mmol/L      CO2 22 mmol/L      ANION GAP 11 mmol/L      BUN 5 mg/dL      Creatinine 0.43 mg/dL      Glucose 82 mg/dL      Calcium 9.0 mg/dL      AST 12 U/L      ALT 7 U/L      Alkaline Phosphatase 57 U/L      Total Protein 7.1 g/dL      Albumin 3.8 g/dL      Total Bilirubin 0.28 mg/dL      eGFR 141 ml/min/1.73sq m     Narrative:      National Kidney Disease Foundation guidelines for Chronic Kidney Disease (CKD):     Stage 1 with normal or high GFR (GFR > 90 mL/min/1.73 square meters)    Stage 2 Mild CKD (GFR = 60-89 mL/min/1.73 square meters)    Stage 3A Moderate CKD (GFR = 45-59 mL/min/1.73 square meters)    Stage 3B Moderate CKD (GFR = 30-44 mL/min/1.73 square meters)    Stage 4 Severe CKD (GFR = 15-29 mL/min/1.73 square meters)    Stage 5 End Stage CKD (GFR <15 mL/min/1.73 square meters)  Note: GFR calculation is accurate only with a steady state creatinine    CBC and differential [[id number]]  (Abnormal) Collected: 06/19/25 1829    Lab Status: Final result Specimen: Blood from Arm, Right Updated: 06/19/25 1836     WBC 12.63 Thousand/uL      RBC 3.85 Million/uL      Hemoglobin 10.7 g/dL      Hematocrit 33.8 %      MCV 88 fL      MCH 27.8 pg      MCHC 31.7 g/dL      RDW 15.0 %      MPV 10.3 fL      Platelets 318 Thousands/uL      nRBC 0 /100 WBCs      Segmented % 73 %      Immature Grans % 0 %      Lymphocytes % 18 %      Monocytes % 9 %       Eosinophils Relative 0 %      Basophils Relative 0 %      Absolute Neutrophils 9.12 Thousands/µL      Absolute Immature Grans 0.05 Thousand/uL      Absolute Lymphocytes 2.30 Thousands/µL      Absolute Monocytes 1.08 Thousand/µL      Eosinophils Absolute 0.05 Thousand/µL      Basophils Absolute 0.03 Thousands/µL             XR chest 1 view portable   ED Interpretation by Sheela Varma MD (06/19 1852)   Per my independent interpretation. Radiologist to provide formal read. Left lingualr pneumonia no PTX no effusion      Final Interpretation by Luis Alfredo Elizabeth MD (06/19 2056)      Left lower lobe and lingular infiltrates noted suggesting pneumonia. Recommend attention on follow-up exam after adequate therapy to ensure complete resolution.            Workstation performed: [id number]             ECG 12 Lead Documentation Only    Date/Time: 6/19/2025 5:56 PM    Performed by: Sheela Varma MD  Authorized by: Sheela Varma MD    Indications / Diagnosis:  Epigastric pain  ECG reviewed by me, the ED Provider: yes    Patient location:  ED  Previous ECG:     Previous ECG:  Compared to current    Comparison ECG info:  2/23/25 1829    Similarity:  No change  Rate:     ECG rate:  83    ECG rate assessment: normal    Rhythm:     Rhythm: sinus rhythm    QRS:     QRS axis:  Normal  Comments:       incomplete RBBB nonspecific twave changes      ED Medication and Procedure Management   Prior to Admission Medications   Prescriptions Last Dose Informant Patient Reported? Taking?   Doxylamine-Pyridoxine 10-10 MG TBEC   No No   Sig: Take 1 tablet by mouth 2 (two) times a day   Patient not taking: Reported on 5/30/2025   Prenatal Vit-Fe Fumarate-FA (PRENATAL PO)   Yes No   Sig: Take by mouth   ferrous sulfate 325 (65 Fe) mg tablet   No No   Sig: Take 1 tablet (325 mg total) by mouth every other day   ondansetron (ZOFRAN) 4 mg tablet   No No   Sig: Take 1 tablet (4 mg total) by mouth every 6 (six) hours    Patient not taking: Reported on 5/12/2025   ondansetron (ZOFRAN) 4 mg tablet   No No   Sig: Take 1 tablet (4 mg total) by mouth every 8 (eight) hours as needed for nausea or vomiting   ondansetron (ZOFRAN-ODT) 4 mg disintegrating tablet   No No   Sig: Take 1 tablet (4 mg total) by mouth every 6 (six) hours as needed for nausea or vomiting for up to 20 doses   ondansetron (ZOFRAN-ODT) 4 mg disintegrating tablet   No No   Sig: Take 1 tablet (4 mg total) by mouth every 6 (six) hours as needed for nausea or vomiting for up to 12 doses   ondansetron (ZOFRAN-ODT) 4 mg disintegrating tablet   No No   Sig: Take 1 tablet (4 mg total) by mouth every 8 (eight) hours as needed for nausea or vomiting   thiamine 50 MG tablet   No No   Sig: Take 1 tablet (50 mg total) by mouth daily      Facility-Administered Medications: None     Discharge Medication List as of 6/19/2025 10:34 PM        START taking these medications    Details   albuterol (PROVENTIL HFA,VENTOLIN HFA) 90 mcg/act inhaler Inhale 2 puffs every 4 (four) hours as needed for shortness of breath (cough), Starting Thu 6/19/2025, Until Fri 6/19/2026 at 2359, Normal      amoxicillin-clavulanate (AUGMENTIN) 875-125 mg per tablet Take 1 tablet by mouth every 12 (twelve) hours for 5 days, Starting Thu 6/19/2025, Until Tue 6/24/2025, Normal      azithromycin (Zithromax Z-Junior) 250 mg tablet Take 1 tablet (250 mg total) by mouth daily for 4 days, Starting Thu 6/19/2025, Until Mon 6/23/2025, Normal      magnesium oxide (MAG-OX) 400 mg tablet Take 1 tablet (400 mg total) by mouth daily for 20 doses, Starting Thu 6/19/2025, Until Wed 7/9/2025, Normal      potassium chloride (Klor-Con M20) 20 mEq tablet Take 1 tablet (20 mEq total) by mouth 2 (two) times a day, Starting Thu 6/19/2025, Normal           CONTINUE these medications which have NOT CHANGED    Details   Doxylamine-Pyridoxine 10-10 MG TBEC Take 1 tablet by mouth 2 (two) times a day, Starting Mon 5/12/2025, Normal       ferrous sulfate 325 (65 Fe) mg tablet Take 1 tablet (325 mg total) by mouth every other day, Starting Sun 2024, Normal      !! ondansetron (ZOFRAN) 4 mg tablet Take 1 tablet (4 mg total) by mouth every 6 (six) hours, Starting Sun 2025, Normal      !! ondansetron (ZOFRAN) 4 mg tablet Take 1 tablet (4 mg total) by mouth every 8 (eight) hours as needed for nausea or vomiting, Starting 2025, Normal      !! ondansetron (ZOFRAN-ODT) 4 mg disintegrating tablet Take 1 tablet (4 mg total) by mouth every 6 (six) hours as needed for nausea or vomiting for up to 20 doses, Starting 2025, Normal      !! ondansetron (ZOFRAN-ODT) 4 mg disintegrating tablet Take 1 tablet (4 mg total) by mouth every 6 (six) hours as needed for nausea or vomiting for up to 12 doses, Starting Tue 4/15/2025, Normal      !! ondansetron (ZOFRAN-ODT) 4 mg disintegrating tablet Take 1 tablet (4 mg total) by mouth every 8 (eight) hours as needed for nausea or vomiting, Starting 2025, Normal      Prenatal Vit-Fe Fumarate-FA (PRENATAL PO) Take by mouth, Historical Med      thiamine 50 MG tablet Take 1 tablet (50 mg total) by mouth daily, Starting 2025, Normal       !! - Potential duplicate medications found. Please discuss with provider.        No discharge procedures on file.  ED SEPSIS DOCUMENTATION   Time reflects when diagnosis was documented in both MDM as applicable and the Disposition within this note       Time User Action Codes Description Comment    2025 10:08 PM Sheela Varma [J18.9] Left lower lobe pneumonia     2025 10:21 PM Sheela Varma [E87.6] Low blood potassium     2025 10:21 PM Sheela Varma [R79.0] Low magnesium level     2025 10:25 PM Sheela Varma [Z34.92] Second trimester pregnancy                    [1]   Past Medical History:  Diagnosis Date     2017    Anemia    [2]   Past Surgical History:  Procedure Laterality Date     WISDOM TOOTH EXTRACTION     [3]   Family History  Problem Relation Name Age of Onset    Heart attack Father Dalton Espinal     No Known Problems Sister      No Known Problems Half-Sister      No Known Problems Son      Cancer Paternal Grandmother      Heart attack Paternal Grandfather      Heart defect Nephew          Congenital    Breast cancer Neg Hx      Colon cancer Neg Hx      Ovarian cancer Neg Hx     [4]   Social History  Tobacco Use    Smoking status: Former     Current packs/day: 0.00     Average packs/day: 0.5 packs/day for 5.0 years (2.5 ttl pk-yrs)     Types: Cigarettes     Quit date: 2022     Years since quitting: 3.4    Smokeless tobacco: Never    Tobacco comments:     Vape   Vaping Use    Vaping status: Former   Substance Use Topics    Alcohol use: No     Comment: rare    Drug use: Not Currently     Types: Marijuana     Comment: last used 2018        Sheela Varma MD  06/20/25 0229

## 2025-06-20 LAB
ATRIAL RATE: 83 BPM
P AXIS: 48 DEGREES
PR INTERVAL: 144 MS
QRS AXIS: 58 DEGREES
QRSD INTERVAL: 84 MS
QT INTERVAL: 346 MS
QTC INTERVAL: 406 MS
T WAVE AXIS: 59 DEGREES
VENTRICULAR RATE: 83 BPM

## 2025-06-20 PROCEDURE — 93010 ELECTROCARDIOGRAM REPORT: CPT | Performed by: INTERNAL MEDICINE

## 2025-06-20 NOTE — DISCHARGE INSTRUCTIONS
Plenty of fliuds  Continue zofran as needed  4 additional days of azithromycin   5 days of augmentin  Supplement potassium and magnesium  Albuterol 2 puffs 4 times a day as needed for cough  Tylenol 650mg every 6 hours as needed for pain, fever (max 3000mg in 24 hours)   Return with  increased shortness of breathing needing MDI more often than every 4 hours worsening or chagne in pain or any new or worsening symptoms

## 2025-06-24 PROBLEM — Z3A.18 18 WEEKS GESTATION OF PREGNANCY: Status: ACTIVE | Noted: 2025-06-24

## 2025-06-24 PROBLEM — O26.899 RH NEGATIVE STATUS DURING PREGNANCY: Status: ACTIVE | Noted: 2025-06-24

## 2025-06-24 PROBLEM — Z97.5 NEXPLANON IN PLACE: Status: RESOLVED | Noted: 2024-09-07 | Resolved: 2025-06-24

## 2025-06-24 PROBLEM — Z67.91 RH NEGATIVE STATUS DURING PREGNANCY: Status: ACTIVE | Noted: 2025-06-24

## 2025-06-30 DIAGNOSIS — R11.2 NAUSEA AND VOMITING: ICD-10-CM

## 2025-07-01 ENCOUNTER — HOSPITAL ENCOUNTER (EMERGENCY)
Facility: HOSPITAL | Age: 25
Discharge: HOME | End: 2025-07-01
Admitting: EMERGENCY MEDICINE
Payer: COMMERCIAL

## 2025-07-01 VITALS
RESPIRATION RATE: 21 BRPM | SYSTOLIC BLOOD PRESSURE: 98 MMHG | HEIGHT: 63 IN | WEIGHT: 100.97 LBS | OXYGEN SATURATION: 98 % | BODY MASS INDEX: 17.89 KG/M2 | DIASTOLIC BLOOD PRESSURE: 58 MMHG | TEMPERATURE: 98.3 F | HEART RATE: 49 BPM

## 2025-07-01 DIAGNOSIS — O21.0 HYPEREMESIS GRAVIDARUM: Primary | ICD-10-CM

## 2025-07-01 LAB
ALBUMIN SERPL BCG-MCNC: 4 G/DL (ref 3.5–5)
ALP SERPL-CCNC: 51 U/L (ref 34–104)
ALT SERPL W P-5'-P-CCNC: 14 U/L (ref 7–52)
AMORPH URATE CRY URNS QL MICRO: ABNORMAL
ANION GAP SERPL CALCULATED.3IONS-SCNC: 12 MMOL/L (ref 4–13)
AST SERPL W P-5'-P-CCNC: 20 U/L (ref 13–39)
ATRIAL RATE: 71 BPM
BACTERIA UR QL AUTO: ABNORMAL /HPF
BASOPHILS # BLD AUTO: 0.03 THOUSANDS/ÂΜL (ref 0–0.1)
BASOPHILS NFR BLD AUTO: 0 % (ref 0–1)
BILIRUB SERPL-MCNC: 0.44 MG/DL (ref 0.2–1)
BILIRUB UR QL STRIP: NEGATIVE
BUN SERPL-MCNC: 11 MG/DL (ref 5–25)
CALCIUM SERPL-MCNC: 9.7 MG/DL (ref 8.4–10.2)
CHLORIDE SERPL-SCNC: 103 MMOL/L (ref 96–108)
CLARITY UR: ABNORMAL
CO2 SERPL-SCNC: 23 MMOL/L (ref 21–32)
COLOR UR: YELLOW
CREAT SERPL-MCNC: 0.51 MG/DL (ref 0.6–1.3)
EOSINOPHIL # BLD AUTO: 0.04 THOUSAND/ÂΜL (ref 0–0.61)
EOSINOPHIL NFR BLD AUTO: 0 % (ref 0–6)
ERYTHROCYTE [DISTWIDTH] IN BLOOD BY AUTOMATED COUNT: 14.1 % (ref 11.6–15.1)
GFR SERPL CREATININE-BSD FRML MDRD: 134 ML/MIN/1.73SQ M
GLUCOSE SERPL-MCNC: 84 MG/DL (ref 65–140)
GLUCOSE UR STRIP-MCNC: NEGATIVE MG/DL
HCT VFR BLD AUTO: 33.1 % (ref 34.8–46.1)
HGB BLD-MCNC: 10.8 G/DL (ref 11.5–15.4)
HGB UR QL STRIP.AUTO: NEGATIVE
IMM GRANULOCYTES # BLD AUTO: 0.08 THOUSAND/UL (ref 0–0.2)
IMM GRANULOCYTES NFR BLD AUTO: 1 % (ref 0–2)
KETONES UR STRIP-MCNC: ABNORMAL MG/DL
LEUKOCYTE ESTERASE UR QL STRIP: ABNORMAL
LIPASE SERPL-CCNC: 18 U/L (ref 11–82)
LYMPHOCYTES # BLD AUTO: 2.39 THOUSANDS/ÂΜL (ref 0.6–4.47)
LYMPHOCYTES NFR BLD AUTO: 25 % (ref 14–44)
MCH RBC QN AUTO: 28.2 PG (ref 26.8–34.3)
MCHC RBC AUTO-ENTMCNC: 32.6 G/DL (ref 31.4–37.4)
MCV RBC AUTO: 86 FL (ref 82–98)
MONOCYTES # BLD AUTO: 0.58 THOUSAND/ÂΜL (ref 0.17–1.22)
MONOCYTES NFR BLD AUTO: 6 % (ref 4–12)
NEUTROPHILS # BLD AUTO: 6.41 THOUSANDS/ÂΜL (ref 1.85–7.62)
NEUTS SEG NFR BLD AUTO: 68 % (ref 43–75)
NITRITE UR QL STRIP: NEGATIVE
NON-SQ EPI CELLS URNS QL MICRO: ABNORMAL /HPF
NRBC BLD AUTO-RTO: 0 /100 WBCS
P AXIS: 71 DEGREES
PH UR STRIP.AUTO: 8.5 [PH]
PLATELET # BLD AUTO: 395 THOUSANDS/UL (ref 149–390)
PMV BLD AUTO: 9.2 FL (ref 8.9–12.7)
POTASSIUM SERPL-SCNC: 3.4 MMOL/L (ref 3.5–5.3)
PR INTERVAL: 118 MS
PROT SERPL-MCNC: 7.2 G/DL (ref 6.4–8.4)
PROT UR STRIP-MCNC: ABNORMAL MG/DL
QRS AXIS: 57 DEGREES
QRSD INTERVAL: 80 MS
QT INTERVAL: 384 MS
QTC INTERVAL: 417 MS
RBC # BLD AUTO: 3.83 MILLION/UL (ref 3.81–5.12)
RBC #/AREA URNS AUTO: ABNORMAL /HPF
SODIUM SERPL-SCNC: 138 MMOL/L (ref 135–147)
SP GR UR STRIP.AUTO: 1.02 (ref 1–1.03)
T WAVE AXIS: 43 DEGREES
UROBILINOGEN UR STRIP-ACNC: <2 MG/DL
VENTRICULAR RATE: 71 BPM
WBC # BLD AUTO: 9.53 THOUSAND/UL (ref 4.31–10.16)
WBC #/AREA URNS AUTO: ABNORMAL /HPF

## 2025-07-01 PROCEDURE — 93010 ELECTROCARDIOGRAM REPORT: CPT | Performed by: INTERNAL MEDICINE

## 2025-07-01 PROCEDURE — 93005 ELECTROCARDIOGRAM TRACING: CPT

## 2025-07-01 PROCEDURE — 99284 EMERGENCY DEPT VISIT MOD MDM: CPT

## 2025-07-01 PROCEDURE — 99283 EMERGENCY DEPT VISIT LOW MDM: CPT

## 2025-07-01 PROCEDURE — 85025 COMPLETE CBC W/AUTO DIFF WBC: CPT

## 2025-07-01 PROCEDURE — 36415 COLL VENOUS BLD VENIPUNCTURE: CPT

## 2025-07-01 PROCEDURE — 96361 HYDRATE IV INFUSION ADD-ON: CPT

## 2025-07-01 PROCEDURE — 80053 COMPREHEN METABOLIC PANEL: CPT

## 2025-07-01 PROCEDURE — 87086 URINE CULTURE/COLONY COUNT: CPT

## 2025-07-01 PROCEDURE — 81001 URINALYSIS AUTO W/SCOPE: CPT

## 2025-07-01 PROCEDURE — 96374 THER/PROPH/DIAG INJ IV PUSH: CPT

## 2025-07-01 PROCEDURE — 83690 ASSAY OF LIPASE: CPT

## 2025-07-01 RX ORDER — CALCIUM CARBONATE 500 MG/1
500 TABLET, CHEWABLE ORAL DAILY PRN
Status: DISCONTINUED | OUTPATIENT
Start: 2025-07-01 | End: 2025-07-01 | Stop reason: HOSPADM

## 2025-07-01 RX ORDER — CEPHALEXIN 500 MG/1
500 CAPSULE ORAL EVERY 8 HOURS SCHEDULED
Qty: 15 CAPSULE | Refills: 0 | Status: SHIPPED | OUTPATIENT
Start: 2025-07-01 | End: 2025-07-06

## 2025-07-01 RX ORDER — ONDANSETRON 4 MG/1
4 TABLET, FILM COATED ORAL EVERY 8 HOURS PRN
Qty: 20 TABLET | Refills: 0 | Status: SHIPPED | OUTPATIENT
Start: 2025-07-01 | End: 2025-07-09 | Stop reason: SDUPTHER

## 2025-07-01 RX ORDER — ONDANSETRON 2 MG/ML
4 INJECTION INTRAMUSCULAR; INTRAVENOUS ONCE
Status: COMPLETED | OUTPATIENT
Start: 2025-07-01 | End: 2025-07-01

## 2025-07-01 RX ADMIN — SODIUM CHLORIDE 1000 ML: 0.9 INJECTION, SOLUTION INTRAVENOUS at 06:52

## 2025-07-01 RX ADMIN — ONDANSETRON 4 MG: 2 INJECTION INTRAMUSCULAR; INTRAVENOUS at 06:51

## 2025-07-01 NOTE — DISCHARGE INSTRUCTIONS
Continue to follow-up with your OB/GYN team,  your Zofran and take as needed.  Come back to the emergency department if not tolerating p.o. in spite of Zofran.

## 2025-07-01 NOTE — Clinical Note
Vannessa Birmingham was seen and treated in our emergency department on 7/1/2025.                Diagnosis:     Vannessa  is off the rest of the shift today.    She may return on this date:          If you have any questions or concerns, please don't hesitate to call.      Eloy Santos MD    ______________________________           _______________          _______________  Hospital Representative                              Date                                Time

## 2025-07-01 NOTE — ED PROVIDER NOTES
Time reflects when diagnosis was documented in both MDM as applicable and the Disposition within this note       Time User Action Codes Description Comment    7/1/2025  7:14 AM SantosEloy Add [O21.0] Hyperemesis gravidarum           ED Disposition       ED Disposition   Discharge    Condition   Stable    Date/Time   Tue Jul 1, 2025  7:53 AM    Comment   Vannessa Birmingham discharge to home/self care.                   Assessment & Plan       Medical Decision Making  Medical complexity: This is a 25-year-old female who who is G2, P1 at approximately 19 weeks 2 days estimated gestational age based on LMP and ultrasound dating.  She is been having nausea and vomiting throughout the pregnancy but significantly worse since she ran out of Zofran 3 days ago.  She does have an exam concerning for very early dehydration.  Will obtain lab work including a CMP, lipase, and CBC.  CBC will evaluate for hemoconcentration as well as for any.  Will evaluate CMP for signs of LFT disturbance, acute renal injury, or acute metabolic disturbances secondary to ongoing vomiting including hypokalemia.  Will evaluate lipase for any sign of pancreatitis or gallstone disorder.  Will obtain IV access and administer IV fluid rehydration.  Will provide antiemetics through the IV and then reassess.  If patient requires subsequent antiemetics, is also a candidate for IV Benadryl, IV Reglan, Compazine, or Phenergan.  Upon patient arrival I did check fetal heart rate which was noted to be normal in the upper 140s.  There was fetal movement appreciated on bedside ultrasound (see images not saved).  Patient is denying red flags of second trimester pregnancy including any vaginal bleeding, vaginal discharge, contractions, abdominal pain, right upper quadrant pain, and does not hypertensive today.  Will screen urinalysis as is customary at this stage in pregnancy.  Patient denying symptoms of UTI.    Reassessment/disposition: Patient felt  significantly better after receiving Zofran with fluid boluses here in the emergency department.  Her labs do demonstrate some disturbances but nothing that would require hospitalization necessarily today.  After receiving treatment in the emergency department, the patient decided that she would prefer to continue trying outpatient therapy.  She will continue to follow-up with her OB/GYN.  I did prescribe her a course of Zofran in the meantime.  Patient was discharged in no acute distress with normal vital signs and stable exam.     Amount and/or Complexity of Data Reviewed  Labs: ordered. Decision-making details documented in ED Course.    Risk  OTC drugs.  Prescription drug management.        ED Course as of 07/04/25 2226 Tue Jul 01, 2025   0710 ECG interpreted by myself.  Date: 7/1/2025.  Time: 0652.  Rate: 71 bpm Axis: Normal.  Rhythm: Regular.  There are no ST elevations or depressions evident on this ECG.  No repolarization abnormalities identified on this ECG.  I interpret this as a normal ECG.   0716 Ketones, UA(!): 80 (3+)       Medications   sodium chloride 0.9 % bolus 1,000 mL (0 mL Intravenous Stopped 7/1/25 0757)   ondansetron (ZOFRAN) injection 4 mg (4 mg Intravenous Given 7/1/25 0651)       ED Risk Strat Scores                    No data recorded        SBIRT 20yo+      Flowsheet Row Most Recent Value   Initial Alcohol Screen: US AUDIT-C     1. How often do you have a drink containing alcohol? 0 Filed at: 07/01/2025 0636   2. How many drinks containing alcohol do you have on a typical day you are drinking?  0 Filed at: 07/01/2025 0636   3b. FEMALE Any Age, or MALE 65+: How often do you have 4 or more drinks on one occassion? 0 Filed at: 07/01/2025 0636   Audit-C Score 0 Filed at: 07/01/2025 0636   KARISSA: How many times in the past year have you...    Used an illegal drug or used a prescription medication for non-medical reasons? Never Filed at: 07/01/2025 0636                            History of  "Present Illness       Chief Complaint   Patient presents with    Vomiting     Patient reports that she is 18 weeks pregnant and has had heartburn and vomiting for the past three days       Past Medical History[1]   Past Surgical History[2]   Family History[3]   Social History[4]   E-Cigarette/Vaping    E-Cigarette Use Former User       E-Cigarette/Vaping Substances    Nicotine No     THC No     CBD No     Flavoring No     Other No     Unknown No       I have reviewed and agree with the history as documented.     This is a 25-year-old G2, P1 female at approximately 19 weeks 2 days EGA based on LMP with ultrasound confirmed dating.  She presents to the emergency department today with concern for nausea and vomiting.  She states that she has been having nausea and vomiting throughout this pregnancy but has been finding relief with prescribed Zofran.  She states that the Zofran ran out about 3 days ago.  Since that time she has been having intractable nausea and vomiting.  She reports significantly decreased p.o. intake and states that it almost every attempt at p.o. over the past 24 hours has resulted in vomiting within minutes.  She states that she is now feeling some pain behind her sternal area which she identifies as \"heartburn\".  She denies any known complications with this pregnancy thus far.  She states that she is not experiencing any vaginal bleeding or vaginal discharge, she feels good fetal movement and is not experiencing contractions with this pregnancy.  Her first pregnancy was complicated by hyperemesis gravidarum.  Otherwise the first pregnancy was not complicated and ended with a spontaneous vaginal delivery of a healthy baby.  The patient is denying any significant abdominal pain or urinary symptoms of concern today.        Review of Systems   Constitutional:  Negative for chills and fever.   HENT:  Negative for ear pain and sore throat.    Eyes:  Negative for pain and visual disturbance. "   Respiratory:  Positive for chest tightness. Negative for cough and shortness of breath.    Cardiovascular:  Negative for chest pain and palpitations.   Gastrointestinal:  Positive for nausea and vomiting. Negative for abdominal pain.   Genitourinary:  Negative for dysuria and hematuria.   Musculoskeletal:  Negative for arthralgias and back pain.   Skin:  Negative for color change and rash.   Neurological:  Negative for seizures and syncope.   All other systems reviewed and are negative.          Objective       ED Triage Vitals [07/01/25 0636]   Temperature Pulse Blood Pressure Respirations SpO2 Patient Position - Orthostatic VS   98.3 °F (36.8 °C) 75 125/79 15 98 % Sitting      Temp Source Heart Rate Source BP Location FiO2 (%) Pain Score    Temporal Monitor Left arm -- 8      Vitals      Date and Time Temp Pulse SpO2 Resp BP Pain Score FACES Pain Rating User   07/01/25 0730 -- 49 98 % 21 98/58 -- -- [facility]   07/01/25 0700 -- 52 100 % 17 101/57 -- -- [facility]   07/01/25 0636 98.3 °F (36.8 °C) 75 98 % 15 125/79 8 -- PP            Physical Exam  Vitals and nursing note reviewed.   Constitutional:       General: She is not in acute distress.     Appearance: She is well-developed and normal weight.   HENT:      Head: Normocephalic and atraumatic.      Right Ear: External ear normal.      Left Ear: External ear normal.      Nose: Nose normal. No congestion or rhinorrhea.      Mouth/Throat:      Mouth: Mucous membranes are dry.      Pharynx: Oropharynx is clear. No oropharyngeal exudate or posterior oropharyngeal erythema.     Eyes:      General: No scleral icterus.     Extraocular Movements: Extraocular movements intact.      Conjunctiva/sclera: Conjunctivae normal.      Pupils: Pupils are equal, round, and reactive to light.       Cardiovascular:      Rate and Rhythm: Normal rate.      Pulses: Normal pulses.   Pulmonary:      Effort: Pulmonary effort is normal. No respiratory distress.   Abdominal:      Tenderness: There is  no abdominal tenderness. There is no guarding.      Comments: Palpable uterine fundus just below the umbilicus, nontender     Musculoskeletal:         General: No swelling.      Cervical back: Normal range of motion. No rigidity.      Right lower leg: No edema.      Left lower leg: No edema.     Skin:     General: Skin is warm and dry.      Capillary Refill: Capillary refill takes 2 to 3 seconds.      Coloration: Skin is pale. Skin is not jaundiced.      Findings: No rash.     Neurological:      General: No focal deficit present.      Mental Status: She is alert and oriented to person, place, and time. Mental status is at baseline.     Psychiatric:         Mood and Affect: Mood normal.         Behavior: Behavior normal.         Results Reviewed       Procedure Component Value Units Date/Time    Urine culture [[id number]] Collected: 07/01/25 0706    Lab Status: Final result Specimen: Urine, Clean Catch Updated: 07/02/25 1351     Urine Culture 60,000-69,000 cfu/ml    Comprehensive metabolic panel [[id number]]  (Abnormal) Collected: 07/01/25 0653    Lab Status: Final result Specimen: Blood from Arm, Right Updated: 07/01/25 0725     Sodium 138 mmol/L      Potassium 3.4 mmol/L      Chloride 103 mmol/L      CO2 23 mmol/L      ANION GAP 12 mmol/L      BUN 11 mg/dL      Creatinine 0.51 mg/dL      Glucose 84 mg/dL      Calcium 9.7 mg/dL      AST 20 U/L      ALT 14 U/L      Alkaline Phosphatase 51 U/L      Total Protein 7.2 g/dL      Albumin 4.0 g/dL      Total Bilirubin 0.44 mg/dL      eGFR 134 ml/min/1.73sq m     Narrative:      National Kid[employer] Foundation guidelines for Chronic Kidney Disease (CKD):     Stage 1 with normal or high GFR (GFR > 90 mL/min/1.73 square meters)    Stage 2 Mild CKD (GFR = 60-89 mL/min/1.73 square meters)    Stage 3A Moderate CKD (GFR = 45-59 mL/min/1.73 square meters)    Stage 3B Moderate CKD (GFR = 30-44 mL/min/1.73 square meters)    Stage 4 Severe CKD (GFR = 15-29 mL/min/1.73 square  meters)    Stage 5 End Stage CKD (GFR <15 mL/min/1.73 square meters)  Note: GFR calculation is accurate only with a steady state creatinine    Lipase [[id number]]  (Normal) Collected: 07/01/25 0653    Lab Status: Final result Specimen: Blood from Arm, Right Updated: 07/01/25 0725     Lipase 18 u/L     Urine Microscopic [[id number]]  (Abnormal) Collected: 07/01/25 0706    Lab Status: Final result Specimen: Urine, Clean Catch Updated: 07/01/25 0723     RBC, UA 0-1 /hpf      WBC, UA 4-10 /hpf      Epithelial Cells Occasional /hpf      Bacteria, UA Moderate /hpf      Amorphous Crystals, UA Moderate     URINE COMMENT --    UA w Reflex to Microscopic w Reflex to Culture [[id number]]  (Abnormal) Collected: 07/01/25 0706    Lab Status: Final result Specimen: Urine, Clean Catch Updated: 07/01/25 0714     Color, UA Yellow     Clarity, UA Cloudy     Specific Gravity, UA 1.020     pH, UA 8.5     Leukocytes, UA Small     Nitrite, UA Negative     Protein, UA 30 (1+) mg/dl      Glucose, UA Negative mg/dl      Ketones, UA 80 (3+) mg/dl      Urobilinogen, UA <2.0 mg/dl      Bilirubin, UA Negative     Occult Blood, UA Negative     URINE COMMENT --    CBC and differential [[id number]]  (Abnormal) Collected: 07/01/25 0653    Lab Status: Final result Specimen: Blood from Arm, Right Updated: 07/01/25 0701     WBC 9.53 Thousand/uL      RBC 3.83 Million/uL      Hemoglobin 10.8 g/dL      Hematocrit 33.1 %      MCV 86 fL      MCH 28.2 pg      MCHC 32.6 g/dL      RDW 14.1 %      MPV 9.2 fL      Platelets 395 Thousands/uL      nRBC 0 /100 WBCs      Segmented % 68 %      Immature Grans % 1 %      Lymphocytes % 25 %      Monocytes % 6 %      Eosinophils Relative 0 %      Basophils Relative 0 %      Absolute Neutrophils 6.41 Thousands/µL      Absolute Immature Grans 0.08 Thousand/uL      Absolute Lymphocytes 2.39 Thousands/µL      Absolute Monocytes 0.58 Thousand/µL      Eosinophils Absolute 0.04 Thousand/µL      Basophils Absolute 0.03 Thousands/µL              No orders to display       Procedures    ED Medication and Procedure Management   Prior to Admission Medications   Prescriptions Last Dose Informant Patient Reported? Taking?   Doxylamine-Pyridoxine 10-10 MG TBEC   No No   Sig: Take 1 tablet by mouth 2 (two) times a day   Patient not taking: Reported on 5/30/2025   Prenatal Vit-Fe Fumarate-FA (PRENATAL PO)   Yes No   Sig: Take by mouth   albuterol (PROVENTIL HFA,VENTOLIN HFA) 90 mcg/act inhaler   No No   Sig: Inhale 2 puffs every 4 (four) hours as needed for shortness of breath (cough)   ferrous sulfate 325 (65 Fe) mg tablet   No No   Sig: Take 1 tablet (325 mg total) by mouth every other day   magnesium oxide (MAG-OX) 400 mg tablet   No No   Sig: Take 1 tablet (400 mg total) by mouth daily for 20 doses   potassium chloride (Klor-Con M20) 20 mEq tablet   No No   Sig: Take 1 tablet (20 mEq total) by mouth 2 (two) times a day   thiamine 50 MG tablet   No No   Sig: Take 1 tablet (50 mg total) by mouth daily      Facility-Administered Medications: None     Discharge Medication List as of 7/1/2025  7:56 AM        START taking these medications    Details   cephalexin (KEFLEX) 500 mg capsule Take 1 capsule (500 mg total) by mouth every 8 (eight) hours for 5 days, Starting Tue 7/1/2025, Until Sun 7/6/2025, Normal      ondansetron (ZOFRAN) 4 mg tablet Take 1 tablet (4 mg total) by mouth every 8 (eight) hours as needed for nausea or vomiting, Starting Tue 7/1/2025, Normal           CONTINUE these medications which have NOT CHANGED    Details   albuterol (PROVENTIL HFA,VENTOLIN HFA) 90 mcg/act inhaler Inhale 2 puffs every 4 (four) hours as needed for shortness of breath (cough), Starting Thu 6/19/2025, Until Fri 6/19/2026 at 2359, Normal      Doxylamine-Pyridoxine 10-10 MG TBEC Take 1 tablet by mouth 2 (two) times a day, Starting Mon 5/12/2025, Normal      ferrous sulfate 325 (65 Fe) mg tablet Take 1 tablet (325 mg total) by mouth every other day, Starting Sun  2024, Normal      magnesium oxide (MAG-OX) 400 mg tablet Take 1 tablet (400 mg total) by mouth daily for 20 doses, Starting Thu 2025, Until 2025, Normal      potassium chloride (Klor-Con M20) 20 mEq tablet Take 1 tablet (20 mEq total) by mouth 2 (two) times a day, Starting Thu 2025, Normal      Prenatal Vit-Fe Fumarate-FA (PRENATAL PO) Take by mouth, Historical Med      thiamine 50 MG tablet Take 1 tablet (50 mg total) by mouth daily, Starting 2025, Normal           No discharge procedures on file.  ED SEPSIS DOCUMENTATION   Time reflects when diagnosis was documented in both MDM as applicable and the Disposition within this note       Time User Action Codes Description Comment    2025  7:14 AM Eloy Santos Add [O21.0] Hyperemesis gravidarum                    [1]   Past Medical History:  Diagnosis Date     2017    Anemia    [2]   Past Surgical History:  Procedure Laterality Date    WISDOM TOOTH EXTRACTION     [3]   Family History  Problem Relation Name Age of Onset    Heart attack Father Dalton Espinal     No Known Problems Sister      No Known Problems Half-Sister      No Known Problems Son      Cancer Paternal Grandmother      Heart attack Paternal Grandfather      Heart defect Nephew          Congenital    Breast cancer Neg Hx      Colon cancer Neg Hx      Ovarian cancer Neg Hx     [4]   Social History  Tobacco Use    Smoking status: Former     Current packs/day: 0.00     Average packs/day: 0.5 packs/day for 5.0 years (2.5 ttl pk-yrs)     Types: Cigarettes     Quit date:      Years since quitting: 3.5    Smokeless tobacco: Never    Tobacco comments:     Vape   Vaping Use    Vaping status: Former   Substance Use Topics    Alcohol use: No     Comment: rare    Drug use: Not Currently     Types: Marijuana     Comment: last used 2018        Eloy Santos MD  [phone]

## 2025-07-02 LAB — BACTERIA UR CULT: NORMAL

## 2025-07-02 RX ORDER — ONDANSETRON 4 MG/1
4 TABLET, FILM COATED ORAL EVERY 8 HOURS PRN
Qty: 20 TABLET | Refills: 0 | OUTPATIENT
Start: 2025-07-02

## 2025-07-07 ENCOUNTER — TELEPHONE (OUTPATIENT)
Dept: OBGYN CLINIC | Facility: MEDICAL CENTER | Age: 25
End: 2025-07-07

## 2025-07-07 DIAGNOSIS — Z3A.20 20 WEEKS GESTATION OF PREGNANCY: ICD-10-CM

## 2025-07-07 DIAGNOSIS — Z36.1 ENCOUNTER FOR ANTENATAL SCREENING FOR HIGH ALPHA-FETOPROTEIN LEVEL: Primary | ICD-10-CM

## 2025-07-07 NOTE — TELEPHONE ENCOUNTER
OB nurse navigator attempted to get in contact with patient for 2nd Trimester call but unable to complete call at this time. Patient unavailable. Left message on vm to return call. "[employer]t message sent.

## 2025-07-08 PROBLEM — Z3A.21 21 WEEKS GESTATION OF PREGNANCY: Status: ACTIVE | Noted: 2025-07-08

## 2025-07-08 NOTE — TELEPHONE ENCOUNTER
2ND TRIMESTER CHECK-IN     Overall how are you doing? Patient reports to be doing well overall. Had pneumonia a few weeks back but doing much better.Still struggling with nausea. Zofran prescribed.     Compliant with routine OB care appointments? Missed last ob visit. Has next ob appointment next week.      Have you completed your 1st trimester labs? Yes.     If you had NIPS with MFM, do you have a order for MSAFP? MSAFP discussed. Ordered. Time-frame reviewed.     Have you seen MFM and do you have your detailed US scheduled? Yes. Patient has anatomy scan scheduled 7/15.     Pregnancy Education: Have you had a chance to review the classes/hospital tour offered and registered? Patient interested. Registration information sent via [email or web address].

## 2025-07-09 DIAGNOSIS — O21.0 HYPEREMESIS GRAVIDARUM: ICD-10-CM

## 2025-07-09 RX ORDER — ONDANSETRON 4 MG/1
4 TABLET, FILM COATED ORAL EVERY 8 HOURS PRN
Qty: 20 TABLET | Refills: 0 | Status: SHIPPED | OUTPATIENT
Start: 2025-07-09 | End: 2025-07-18 | Stop reason: SDUPTHER

## 2025-07-09 RX ORDER — ONDANSETRON 4 MG/1
4 TABLET, FILM COATED ORAL EVERY 8 HOURS PRN
Qty: 20 TABLET | Refills: 0 | Status: CANCELLED | OUTPATIENT
Start: 2025-07-09

## 2025-07-13 ENCOUNTER — APPOINTMENT (OUTPATIENT)
Dept: LAB | Facility: HOSPITAL | Age: 25
End: 2025-07-13
Payer: COMMERCIAL

## 2025-07-13 DIAGNOSIS — Z36.1 ENCOUNTER FOR ANTENATAL SCREENING FOR HIGH ALPHA-FETOPROTEIN LEVEL: ICD-10-CM

## 2025-07-13 DIAGNOSIS — Z3A.20 20 WEEKS GESTATION OF PREGNANCY: ICD-10-CM

## 2025-07-13 PROCEDURE — 82105 ALPHA-FETOPROTEIN SERUM: CPT

## 2025-07-13 PROCEDURE — 36415 COLL VENOUS BLD VENIPUNCTURE: CPT

## 2025-07-15 ENCOUNTER — ANCILLARY PROCEDURE (OUTPATIENT)
Dept: PERINATAL CARE | Facility: OTHER | Age: 25
End: 2025-07-15
Attending: STUDENT IN AN ORGANIZED HEALTH CARE EDUCATION/TRAINING PROGRAM
Payer: COMMERCIAL

## 2025-07-15 ENCOUNTER — ROUTINE PRENATAL (OUTPATIENT)
Dept: PERINATAL CARE | Facility: OTHER | Age: 25
End: 2025-07-15
Payer: COMMERCIAL

## 2025-07-15 VITALS
HEIGHT: 63 IN | SYSTOLIC BLOOD PRESSURE: 110 MMHG | WEIGHT: 107 LBS | HEART RATE: 79 BPM | BODY MASS INDEX: 18.96 KG/M2 | DIASTOLIC BLOOD PRESSURE: 58 MMHG

## 2025-07-15 DIAGNOSIS — Z3A.21 21 WEEKS GESTATION OF PREGNANCY: Primary | ICD-10-CM

## 2025-07-15 DIAGNOSIS — Z3A.21 21 WEEKS GESTATION OF PREGNANCY: ICD-10-CM

## 2025-07-15 PROCEDURE — 76817 TRANSVAGINAL US OBSTETRIC: CPT | Performed by: OBSTETRICS & GYNECOLOGY

## 2025-07-15 PROCEDURE — 76805 OB US >/= 14 WKS SNGL FETUS: CPT | Performed by: OBSTETRICS & GYNECOLOGY

## 2025-07-17 LAB
2ND TRIMESTER 4 SCREEN SERPL-IMP: NORMAL
AFP ADJ MOM SERPL: 0.7
AFP INTERP AMN-IMP: NORMAL
AFP INTERP SERPL-IMP: NORMAL
AFP INTERP SERPL-IMP: NORMAL
AFP SERPL-MCNC: 59.2 NG/ML
AGE AT DELIVERY: 25.6 YR
GA METHOD: NORMAL
GA: 21 WEEKS
IDDM PATIENT QL: NO
MULTIPLE PREGNANCY: NO
NEURAL TUBE DEFECT RISK FETUS: NORMAL %

## 2025-07-18 ENCOUNTER — INITIAL PRENATAL (OUTPATIENT)
Dept: OBGYN CLINIC | Facility: CLINIC | Age: 25
End: 2025-07-18

## 2025-07-18 VITALS — WEIGHT: 109.8 LBS | BODY MASS INDEX: 19.45 KG/M2 | SYSTOLIC BLOOD PRESSURE: 102 MMHG | DIASTOLIC BLOOD PRESSURE: 66 MMHG

## 2025-07-18 DIAGNOSIS — O26.892 RH NEGATIVE STATUS DURING PREGNANCY IN SECOND TRIMESTER: ICD-10-CM

## 2025-07-18 DIAGNOSIS — Z67.91 RH NEGATIVE STATUS DURING PREGNANCY IN SECOND TRIMESTER: ICD-10-CM

## 2025-07-18 DIAGNOSIS — Z3A.21 21 WEEKS GESTATION OF PREGNANCY: ICD-10-CM

## 2025-07-18 DIAGNOSIS — O21.0 HYPEREMESIS GRAVIDARUM: ICD-10-CM

## 2025-07-18 DIAGNOSIS — O21.0 HYPEREMESIS GRAVIDARUM: Primary | ICD-10-CM

## 2025-07-18 DIAGNOSIS — Z34.92 SECOND TRIMESTER PREGNANCY: ICD-10-CM

## 2025-07-18 DIAGNOSIS — Z87.440 HISTORY OF UTI: ICD-10-CM

## 2025-07-18 PROCEDURE — 87491 CHLMYD TRACH DNA AMP PROBE: CPT | Performed by: ADVANCED PRACTICE MIDWIFE

## 2025-07-18 PROCEDURE — PNV: Performed by: ADVANCED PRACTICE MIDWIFE

## 2025-07-18 PROCEDURE — 87591 N.GONORRHOEAE DNA AMP PROB: CPT | Performed by: ADVANCED PRACTICE MIDWIFE

## 2025-07-18 PROCEDURE — 87086 URINE CULTURE/COLONY COUNT: CPT | Performed by: ADVANCED PRACTICE MIDWIFE

## 2025-07-18 RX ORDER — ONDANSETRON 4 MG/1
4 TABLET, FILM COATED ORAL EVERY 12 HOURS PRN
Qty: 30 TABLET | Refills: 1 | Status: SHIPPED | OUTPATIENT
Start: 2025-07-18

## 2025-07-18 NOTE — ASSESSMENT & PLAN NOTE
Provide RhoGAM if any bleeding prior to 28 weeks  RhoGAM 28 weeks  Postpartum RhoGAM if indicated

## 2025-07-18 NOTE — ASSESSMENT & PLAN NOTE
- Prenatal labs reviewed and normal.  Blood type: A negative  - Aneuploidy screening discussed.  Patient completed NIPS/AFP- low risk.  - Routine cervical cancer screening: Pap Up to date.  - Routine STI Screening: GC/Chlamydia sent today.  HIV/Hep B/Syphilis ordered in prenatal panel.  - Patient Education: Patient was counseled regarding diet, exercise, weight gain, foods to avoid, vaccines in pregnancy, aneuploidy screening, travel precautions to include seat belt use and VTE risk reduction.  She has been provided our pregnancy packet which includes how and when to contact providers, medication recommendations, dietary suggestions, breastfeeding information as well as websites for additional information, hospital and delivery concerns.    -

## 2025-07-18 NOTE — PROGRESS NOTES
Name: Vannessa Birmingham      : 2000      MRN: [id number]  Encounter Provider: Parisa Ho CNM  Encounter Date: 2025   Encounter department: [facility] [place]  :  Assessment & Plan  Hyperemesis gravidarum  Order for Zofran was sent.        Rh negative status during pregnancy in second trimester  Provide RhoGAM if any bleeding prior to 28 weeks  RhoGAM 28 weeks  Postpartum RhoGAM if indicated           Second trimester pregnancy         21 weeks gestation of pregnancy  - Prenatal labs reviewed and normal.  Blood type: A negative  - Aneuploidy screening discussed.  Patient completed NIPS/AFP- low risk.  - Routine cervical cancer screening: Pap Up to date.  - Routine STI Screening: GC/Chlamydia sent today.  HIV/Hep B/Syphilis ordered in prenatal panel.  - Patient Education: Patient was counseled regarding diet, exercise, weight gain, foods to avoid, vaccines in pregnancy, aneuploidy screening, travel precautions to include seat belt use and VTE risk reduction.  She has been provided our pregnancy packet which includes how and when to contact providers, medication recommendations, dietary suggestions, breastfeeding information as well as websites for additional information, hospital and delivery concerns.    -            History of Present Illness   Vannessa Birmingham is a 25 y.o.  female who presents for routine prenatal care at 21w5d.  Pregnancy ROS: no leakage of fluid, pelvic pain, or vaginal bleeding.  Notes fetal movement.     Notes that with Zofran she has less vomiting, but is still vomiting. Drinking mostly water and sprite. Notes that she is getting protein 1 time per day. Notes that carbs are better for her to maintain and not vomit.     Objective:  /66   Wt 49.8 kg (109 lb 12.8 oz)   LMP 2025 (Exact Date)   BMI 19.45 kg/m²   Pregravid Weight/BMI: 45.4 kg (100 lb) (BMI 17.72)  Current Weight: 49.8 kg (109 lb 12.8 oz)   Total Weight Gain:  4.445 kg (9 lb 12.8 oz)   Pre-Tressa Vitals    Flowsheet Row Most Recent Value   Prenatal Assessment    Fetal Heart Rate 156   Movement Present   Prenatal Vitals    Blood Pressure 102/66   Weight - Scale 49.8 kg (109 lb 12.8 oz)   Urine Albumin/Glucose    Dilation/Effacement/Station    Vaginal Drainage    Edema    LLE Edema Trace   RLE Edema Trace          Vannessa Birmingham is a 25 y.o. female who presents for prenatal visit  History obtained from: patient    Review of Systems   Constitutional:  Negative for chills and fever.   Respiratory:  Negative for cough and shortness of breath.    Cardiovascular:  Negative for chest pain and palpitations.   Genitourinary:  Negative for difficulty urinating and vaginal bleeding.   Neurological:  Negative for headaches.   Psychiatric/Behavioral:  The patient is not nervous/anxious.      Medical History Reviewed by provider this encounter:     .  Medications Ordered Prior to Encounter[1]      Objective   /66   Wt 49.8 kg (109 lb 12.8 oz)   LMP 2025 (Exact Date)   BMI 19.45 kg/m²      Physical Exam  Vitals reviewed.   Constitutional:       Appearance: Normal appearance.   Pulmonary:      Effort: Pulmonary effort is normal.   Abdominal:      Comments: Gravid uterus     Neurological:      Mental Status: She is alert and oriented to person, place, and time.     Psychiatric:         Mood and Affect: Mood normal.         Behavior: Behavior normal.            [1]   Current Outpatient Medications on File Prior to Visit   Medication Sig Dispense Refill    albuterol (PROVENTIL HFA,VENTOLIN HFA) 90 mcg/act inhaler Inhale 2 puffs every 4 (four) hours as needed for shortness of breath (cough) 8 g 0    ferrous sulfate 325 (65 Fe) mg tablet Take 1 tablet (325 mg total) by mouth every other day 30 tablet 0    potassium chloride (Klor-Con M20) 20 mEq tablet Take 1 tablet (20 mEq total) by mouth 2 (two) times a day 20 tablet 0    Prenatal Vit-Fe Fumarate-FA (PRENATAL PO) Take by  mouth      thiamine 50 MG tablet Take 1 tablet (50 mg total) by mouth daily 30 tablet 3    [DISCONTINUED] ondansetron (ZOFRAN) 4 mg tablet Take 1 tablet (4 mg total) by mouth every 8 (eight) hours as needed for nausea or vomiting 20 tablet 0    Doxylamine-Pyridoxine 10-10 MG TBEC Take 1 tablet by mouth 2 (two) times a day (Patient not taking: Reported on 5/23/2025) 60 tablet 3     No current facility-administered medications on file prior to visit.      Skin normal color for race, hot to touch, dry and intact. No evidence of rash.

## 2025-07-19 LAB
C TRACH DNA SPEC QL NAA+PROBE: NEGATIVE
N GONORRHOEA DNA SPEC QL NAA+PROBE: NEGATIVE

## 2025-07-20 LAB — BACTERIA UR CULT: NORMAL

## 2025-08-16 DIAGNOSIS — O21.0 HYPEREMESIS GRAVIDARUM: ICD-10-CM

## 2025-08-18 RX ORDER — ONDANSETRON 4 MG/1
4 TABLET, FILM COATED ORAL EVERY 12 HOURS PRN
Qty: 30 TABLET | Refills: 0 | Status: SHIPPED | OUTPATIENT
Start: 2025-08-18

## 2025-08-20 ENCOUNTER — ROUTINE PRENATAL (OUTPATIENT)
Dept: OBGYN CLINIC | Facility: CLINIC | Age: 25
End: 2025-08-20

## 2025-08-20 VITALS — SYSTOLIC BLOOD PRESSURE: 116 MMHG | DIASTOLIC BLOOD PRESSURE: 60 MMHG | BODY MASS INDEX: 19.7 KG/M2 | WEIGHT: 111.2 LBS

## 2025-08-20 DIAGNOSIS — Z34.92 SECOND TRIMESTER PREGNANCY: Primary | ICD-10-CM

## 2025-08-20 DIAGNOSIS — O21.0 HYPEREMESIS GRAVIDARUM: ICD-10-CM

## 2025-08-20 DIAGNOSIS — D64.9 CHRONIC ANEMIA: ICD-10-CM

## 2025-08-20 DIAGNOSIS — Z67.91 RH NEGATIVE STATUS DURING PREGNANCY IN SECOND TRIMESTER: ICD-10-CM

## 2025-08-20 DIAGNOSIS — Z3A.26 26 WEEKS GESTATION OF PREGNANCY: ICD-10-CM

## 2025-08-20 DIAGNOSIS — O26.892 RH NEGATIVE STATUS DURING PREGNANCY IN SECOND TRIMESTER: ICD-10-CM

## 2025-08-20 PROCEDURE — PNV: Performed by: OBSTETRICS & GYNECOLOGY
